# Patient Record
Sex: MALE | Race: OTHER | HISPANIC OR LATINO
[De-identification: names, ages, dates, MRNs, and addresses within clinical notes are randomized per-mention and may not be internally consistent; named-entity substitution may affect disease eponyms.]

---

## 2018-02-14 ENCOUNTER — APPOINTMENT (OUTPATIENT)
Dept: GASTROENTEROLOGY | Facility: CLINIC | Age: 63
End: 2018-02-14
Payer: COMMERCIAL

## 2018-02-14 VITALS
SYSTOLIC BLOOD PRESSURE: 133 MMHG | RESPIRATION RATE: 16 BRPM | WEIGHT: 250 LBS | HEART RATE: 97 BPM | HEIGHT: 68 IN | DIASTOLIC BLOOD PRESSURE: 92 MMHG | BODY MASS INDEX: 37.89 KG/M2

## 2018-02-14 DIAGNOSIS — Z85.038 PERSONAL HISTORY OF OTHER MALIGNANT NEOPLASM OF LARGE INTESTINE: ICD-10-CM

## 2018-02-14 PROCEDURE — 99203 OFFICE O/P NEW LOW 30 MIN: CPT

## 2018-05-04 ENCOUNTER — APPOINTMENT (OUTPATIENT)
Dept: GASTROENTEROLOGY | Facility: GI CENTER | Age: 63
End: 2018-05-04
Payer: COMMERCIAL

## 2018-05-04 ENCOUNTER — RESULT REVIEW (OUTPATIENT)
Age: 63
End: 2018-05-04

## 2018-05-04 ENCOUNTER — OUTPATIENT (OUTPATIENT)
Dept: OUTPATIENT SERVICES | Facility: HOSPITAL | Age: 63
LOS: 1 days | End: 2018-05-04
Payer: COMMERCIAL

## 2018-05-04 DIAGNOSIS — Z86.39 PERSONAL HISTORY OF OTHER ENDOCRINE, NUTRITIONAL AND METABOLIC DISEASE: ICD-10-CM

## 2018-05-04 DIAGNOSIS — D12.2 BENIGN NEOPLASM OF ASCENDING COLON: ICD-10-CM

## 2018-05-04 DIAGNOSIS — D12.5 BENIGN NEOPLASM OF SIGMOID COLON: ICD-10-CM

## 2018-05-04 DIAGNOSIS — Z85.038 PERSONAL HISTORY OF OTHER MALIGNANT NEOPLASM OF LARGE INTESTINE: ICD-10-CM

## 2018-05-04 DIAGNOSIS — K64.8 OTHER HEMORRHOIDS: ICD-10-CM

## 2018-05-04 DIAGNOSIS — Z86.010 PERSONAL HISTORY OF COLONIC POLYPS: ICD-10-CM

## 2018-05-04 PROCEDURE — 45380 COLONOSCOPY AND BIOPSY: CPT

## 2018-05-04 PROCEDURE — 88305 TISSUE EXAM BY PATHOLOGIST: CPT | Mod: 26

## 2018-05-04 PROCEDURE — 88305 TISSUE EXAM BY PATHOLOGIST: CPT

## 2018-05-04 PROCEDURE — 45380 COLONOSCOPY AND BIOPSY: CPT | Mod: PT

## 2018-05-10 LAB — SURGICAL PATHOLOGY FINAL REPORT - CH: SIGNIFICANT CHANGE UP

## 2019-04-23 ENCOUNTER — OUTPATIENT (OUTPATIENT)
Dept: OUTPATIENT SERVICES | Facility: HOSPITAL | Age: 64
LOS: 1 days | End: 2019-04-23
Payer: MEDICARE

## 2019-04-23 ENCOUNTER — APPOINTMENT (OUTPATIENT)
Dept: CT IMAGING | Facility: CLINIC | Age: 64
End: 2019-04-23
Payer: MEDICARE

## 2019-04-23 DIAGNOSIS — Z00.8 ENCOUNTER FOR OTHER GENERAL EXAMINATION: ICD-10-CM

## 2019-04-23 PROCEDURE — 70491 CT SOFT TISSUE NECK W/DYE: CPT | Mod: 26

## 2019-04-23 PROCEDURE — 82565 ASSAY OF CREATININE: CPT

## 2019-04-23 PROCEDURE — 70492 CT SFT TSUE NCK W/O & W/DYE: CPT

## 2019-11-26 ENCOUNTER — TRANSCRIPTION ENCOUNTER (OUTPATIENT)
Age: 64
End: 2019-11-26

## 2019-11-27 ENCOUNTER — OUTPATIENT (OUTPATIENT)
Dept: OUTPATIENT SERVICES | Facility: HOSPITAL | Age: 64
LOS: 1 days | End: 2019-11-27
Payer: MEDICARE

## 2019-11-27 DIAGNOSIS — M54.16 RADICULOPATHY, LUMBAR REGION: ICD-10-CM

## 2019-11-27 PROCEDURE — 77003 FLUOROGUIDE FOR SPINE INJECT: CPT

## 2019-11-27 PROCEDURE — 62323 NJX INTERLAMINAR LMBR/SAC: CPT

## 2020-02-11 ENCOUNTER — TRANSCRIPTION ENCOUNTER (OUTPATIENT)
Age: 65
End: 2020-02-11

## 2020-02-12 ENCOUNTER — OUTPATIENT (OUTPATIENT)
Dept: OUTPATIENT SERVICES | Facility: HOSPITAL | Age: 65
LOS: 1 days | End: 2020-02-12
Payer: MEDICARE

## 2020-02-12 DIAGNOSIS — M54.16 RADICULOPATHY, LUMBAR REGION: ICD-10-CM

## 2020-02-12 PROCEDURE — 62323 NJX INTERLAMINAR LMBR/SAC: CPT

## 2020-02-12 PROCEDURE — 77003 FLUOROGUIDE FOR SPINE INJECT: CPT

## 2021-09-22 ENCOUNTER — NON-APPOINTMENT (OUTPATIENT)
Age: 66
End: 2021-09-22

## 2021-09-24 ENCOUNTER — APPOINTMENT (OUTPATIENT)
Dept: UROLOGY | Facility: CLINIC | Age: 66
End: 2021-09-24

## 2021-09-25 ENCOUNTER — EMERGENCY (EMERGENCY)
Facility: HOSPITAL | Age: 66
LOS: 1 days | Discharge: HOME | End: 2021-09-25
Attending: STUDENT IN AN ORGANIZED HEALTH CARE EDUCATION/TRAINING PROGRAM
Payer: MEDICARE

## 2021-09-25 VITALS
DIASTOLIC BLOOD PRESSURE: 115 MMHG | WEIGHT: 265 LBS | OXYGEN SATURATION: 97 % | SYSTOLIC BLOOD PRESSURE: 148 MMHG | TEMPERATURE: 99 F | RESPIRATION RATE: 18 BRPM | HEART RATE: 72 BPM

## 2021-09-25 VITALS
TEMPERATURE: 98 F | HEART RATE: 63 BPM | SYSTOLIC BLOOD PRESSURE: 112 MMHG | DIASTOLIC BLOOD PRESSURE: 73 MMHG | OXYGEN SATURATION: 97 %

## 2021-09-25 LAB
APPEARANCE UR: ABNORMAL
BACTERIA # UR AUTO: NEGATIVE — SIGNIFICANT CHANGE UP
BILIRUB UR-MCNC: NEGATIVE — SIGNIFICANT CHANGE UP
COLOR SPEC: YELLOW — SIGNIFICANT CHANGE UP
DIFF PNL FLD: ABNORMAL
EPI CELLS # UR: SIGNIFICANT CHANGE UP
GLUCOSE UR QL: NEGATIVE MG/DL — SIGNIFICANT CHANGE UP
KETONES UR-MCNC: NEGATIVE — SIGNIFICANT CHANGE UP
LEUKOCYTE ESTERASE UR-ACNC: ABNORMAL
NITRITE UR-MCNC: NEGATIVE — SIGNIFICANT CHANGE UP
PH UR: 7 — SIGNIFICANT CHANGE UP (ref 5–8)
PROT UR-MCNC: 30 MG/DL
RBC CASTS # UR COMP ASSIST: ABNORMAL /HPF (ref 0–4)
SP GR SPEC: 1.01 — SIGNIFICANT CHANGE UP (ref 1.01–1.02)
UROBILINOGEN FLD QL: 8 MG/DL
WBC UR QL: ABNORMAL

## 2021-09-25 PROCEDURE — 87086 URINE CULTURE/COLONY COUNT: CPT

## 2021-09-25 PROCEDURE — 76870 US EXAM SCROTUM: CPT

## 2021-09-25 PROCEDURE — 76870 US EXAM SCROTUM: CPT | Mod: 26

## 2021-09-25 PROCEDURE — 96372 THER/PROPH/DIAG INJ SC/IM: CPT

## 2021-09-25 PROCEDURE — 99284 EMERGENCY DEPT VISIT MOD MDM: CPT | Mod: 25

## 2021-09-25 PROCEDURE — 87491 CHLMYD TRACH DNA AMP PROBE: CPT

## 2021-09-25 PROCEDURE — 99284 EMERGENCY DEPT VISIT MOD MDM: CPT

## 2021-09-25 PROCEDURE — 81001 URINALYSIS AUTO W/SCOPE: CPT

## 2021-09-25 PROCEDURE — 87591 N.GONORRHOEAE DNA AMP PROB: CPT

## 2021-09-25 RX ORDER — MOXIFLOXACIN HYDROCHLORIDE TABLETS, 400 MG 400 MG/1
1 TABLET, FILM COATED ORAL
Qty: 20 | Refills: 0
Start: 2021-09-25 | End: 2021-10-04

## 2021-09-25 RX ORDER — OXYCODONE AND ACETAMINOPHEN 5; 325 MG/1; MG/1
1 TABLET ORAL ONCE
Refills: 0 | Status: DISCONTINUED | OUTPATIENT
Start: 2021-09-25 | End: 2021-09-25

## 2021-09-25 RX ORDER — KETOROLAC TROMETHAMINE 30 MG/ML
30 SYRINGE (ML) INJECTION ONCE
Refills: 0 | Status: DISCONTINUED | OUTPATIENT
Start: 2021-09-25 | End: 2021-09-25

## 2021-09-25 RX ORDER — CIPROFLOXACIN LACTATE 400MG/40ML
500 VIAL (ML) INTRAVENOUS ONCE
Refills: 0 | Status: COMPLETED | OUTPATIENT
Start: 2021-09-25 | End: 2021-09-25

## 2021-09-25 RX ADMIN — Medication 500 MILLIGRAM(S): at 18:50

## 2021-09-25 RX ADMIN — Medication 30 MILLIGRAM(S): at 12:25

## 2021-09-25 RX ADMIN — OXYCODONE AND ACETAMINOPHEN 1 TABLET(S): 5; 325 TABLET ORAL at 12:25

## 2021-09-25 NOTE — ED PROVIDER NOTE - CARE PROVIDER_API CALL
Abilio Guerrero  UROLOGY  [id number] [address] [place], [place] [address]  Phone: [phone]  Fax: [phone]  Follow Up Time: 1-3 Days

## 2021-09-25 NOTE — ED PROVIDER NOTE - OBJECTIVE STATEMENT
Pt is a 67 yo M with 3 d hx of R testicular pain/swelling.  pt states that the pain has been constant and worsening. no trauma. no penile discharge. Pt states that the pain is nonradiating and the testicle is ttp. no n/v. no fever/chills. no other complaints.

## 2021-09-25 NOTE — ED PROVIDER NOTE - PATIENT PORTAL LINK FT
You can access the FollowMyHealth Patient Portal offered by [facility] by registering at the following website: [email or web address]. By joining [employer]’s FollowMyHealth portal, you will also be able to view your health information using other applications (apps) compatible with our system.

## 2021-09-25 NOTE — ED PROVIDER NOTE - PHYSICAL EXAMINATION
Constitutional - well-developed; well nourished. Head - NCAT. Airway patent. Eyes - PERRL. CV - RRR. no murmur. no edema. Pulm - CTAB. Abd - soft, nt. no rebound. no guarding. Neuro - A&Ox3. strength 5/5 x4. sensation intact x4. normal gait. Skin - No rash. MSK - normal ROM.    - R testicle with significant ttp and firmness. no erythema. no crepitus.

## 2021-09-25 NOTE — ED PROVIDER NOTE - CLINICAL SUMMARY MEDICAL DECISION MAKING FREE TEXT BOX
labs and imaging reviewed. Pt with epididymoorchitis.  Will start cipro and d/c with outpatient f/up to urology. instructed to return for worsening pain, fever, vomiting, or any other concerns.  Pt given a copy of all results and instructed to f/up with pcp regarding any abnormal results.

## 2021-09-27 LAB
C TRACH RRNA SPEC QL NAA+PROBE: SIGNIFICANT CHANGE UP
CULTURE RESULTS: SIGNIFICANT CHANGE UP
N GONORRHOEA RRNA SPEC QL NAA+PROBE: SIGNIFICANT CHANGE UP
SPECIMEN SOURCE: SIGNIFICANT CHANGE UP
SPECIMEN SOURCE: SIGNIFICANT CHANGE UP

## 2021-10-08 PROBLEM — I10 ESSENTIAL (PRIMARY) HYPERTENSION: Chronic | Status: ACTIVE | Noted: 2021-09-25

## 2021-10-08 PROBLEM — J44.9 CHRONIC OBSTRUCTIVE PULMONARY DISEASE, UNSPECIFIED: Chronic | Status: ACTIVE | Noted: 2021-09-25

## 2021-10-12 ENCOUNTER — APPOINTMENT (OUTPATIENT)
Dept: UROLOGY | Facility: CLINIC | Age: 66
End: 2021-10-12
Payer: MEDICARE

## 2021-10-12 VITALS
SYSTOLIC BLOOD PRESSURE: 114 MMHG | HEIGHT: 68 IN | DIASTOLIC BLOOD PRESSURE: 74 MMHG | WEIGHT: 260 LBS | BODY MASS INDEX: 39.4 KG/M2 | HEART RATE: 83 BPM

## 2021-10-12 PROCEDURE — 99204 OFFICE O/P NEW MOD 45 MIN: CPT

## 2021-10-12 RX ORDER — SODIUM SULFATE, POTASSIUM SULFATE, MAGNESIUM SULFATE 17.5; 3.13; 1.6 G/ML; G/ML; G/ML
17.5-3.13-1.6 SOLUTION, CONCENTRATE ORAL
Qty: 1 | Refills: 0 | Status: DISCONTINUED | COMMUNITY
Start: 2018-02-14 | End: 2021-10-12

## 2021-10-12 RX ORDER — MONTELUKAST 10 MG/1
10 TABLET, FILM COATED ORAL
Refills: 0 | Status: ACTIVE | COMMUNITY

## 2021-10-12 NOTE — PHYSICAL EXAM
[General Appearance - Well Developed] : well developed [General Appearance - Well Nourished] : well nourished [Normal Appearance] : normal appearance [Well Groomed] : well groomed [General Appearance - In No Acute Distress] : no acute distress [Edema] : no peripheral edema [] : no respiratory distress [Respiration, Rhythm And Depth] : normal respiratory rhythm and effort [Exaggerated Use Of Accessory Muscles For Inspiration] : no accessory muscle use [Costovertebral Angle Tenderness] : no ~M costovertebral angle tenderness [FreeTextEntry1] : see HPI [Oriented To Time, Place, And Person] : oriented to person, place, and time [Affect] : the affect was normal [Mood] : the mood was normal [Not Anxious] : not anxious

## 2021-10-12 NOTE — HISTORY OF PRESENT ILLNESS
[FreeTextEntry1] : 65 yo M for initial consultation\par PMH: HTN, diet controlled DM, herniated disk, arthritis\par PSH: colon resection (due to cancer, does not know which surgery) 12 years ago, no adjuvant therapy. 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 when he was 13 years old.\par allergy to penicillin\par \par has a history of prostate cancer, was treated with radiation\par having regular PSA tests that are normal (no documents), had his last check just now\par when asked does not have many LUTS, mainly complains of nocturia times 3\par is not on any medication, took flomax in the past, but stopped\par \par complained of right testicular inflammation 2 weeks ago\par was seen in the ED, started on cipro, took 14 days\par US: right epididymoorchitis with large hydrocele\par urine culture: aerococcus\par feeling better, still has some swelling and discomfort, but feeling good\par \par on exam today: still some tenderness, no fluctuations\par explained the patient will need further treatment and repeat imaging\par will re-assess the status of voiding and hydrocele in the future \par \par \par plan:\par - continue NSAIDs and local compressors\par - will prescribe more antibiotics (cipro + doxycycline) \par - start flomax\par - follow up in 1 month with repeat ultrasound and uroflow\par \par

## 2021-10-12 NOTE — ASSESSMENT
[FreeTextEntry1] : \par \par plan:\par - continue NSAIDs and local compressors\par - will prescribe more antibiotics (cipro + doxycycline) \par - start flomax\par - follow up in 1 month with repeat ultrasound and uroflow\par \par

## 2021-11-11 ENCOUNTER — APPOINTMENT (OUTPATIENT)
Dept: ULTRASOUND IMAGING | Facility: CLINIC | Age: 66
End: 2021-11-11
Payer: MEDICARE

## 2021-11-11 ENCOUNTER — OUTPATIENT (OUTPATIENT)
Dept: OUTPATIENT SERVICES | Facility: HOSPITAL | Age: 66
LOS: 1 days | End: 2021-11-11

## 2021-11-11 DIAGNOSIS — N45.3 EPIDIDYMO-ORCHITIS: ICD-10-CM

## 2021-11-11 PROCEDURE — 93975 VASCULAR STUDY: CPT | Mod: 26

## 2021-11-16 ENCOUNTER — APPOINTMENT (OUTPATIENT)
Dept: UROLOGY | Facility: CLINIC | Age: 66
End: 2021-11-16
Payer: MEDICARE

## 2021-11-16 PROCEDURE — 99213 OFFICE O/P EST LOW 20 MIN: CPT

## 2021-11-16 NOTE — ASSESSMENT
[FreeTextEntry1] : plan:\par - abx for 2 more weeks\par - next visit in 1 month with repeat ultrasound

## 2021-11-16 NOTE — PHYSICAL EXAM
[General Appearance - Well Developed] : well developed [General Appearance - Well Nourished] : well nourished [Normal Appearance] : normal appearance [Well Groomed] : well groomed [General Appearance - In No Acute Distress] : no acute distress [FreeTextEntry1] : see HPI [] : no respiratory distress [Respiration, Rhythm And Depth] : normal respiratory rhythm and effort [Exaggerated Use Of Accessory Muscles For Inspiration] : no accessory muscle use [Oriented To Time, Place, And Person] : oriented to person, place, and time [Affect] : the affect was normal [Mood] : the mood was normal [Not Anxious] : not anxious [Normal Station and Gait] : the gait and station were normal for the patient's age

## 2021-11-16 NOTE — HISTORY OF PRESENT ILLNESS
[FreeTextEntry1] : 65 yo M for follow up\par see full notes from previous visit\par \par finished abx\par feeling much better\par US: signs of potential 7 mm residual abscess\par on exam, difficult to assess due to hydrocele, but some mild tenderness remains\par \par patient says his voiding is good with no complains\par again, has his PSA followed regularly, but does not have results\par \par plan:\par - abx for 2 more weeks\par - next visit in 1 month with repeat ultrasound

## 2021-12-17 ENCOUNTER — APPOINTMENT (OUTPATIENT)
Dept: ULTRASOUND IMAGING | Facility: CLINIC | Age: 66
End: 2021-12-17

## 2022-01-03 ENCOUNTER — OUTPATIENT (OUTPATIENT)
Dept: OUTPATIENT SERVICES | Facility: HOSPITAL | Age: 67
LOS: 1 days | End: 2022-01-03

## 2022-01-03 ENCOUNTER — APPOINTMENT (OUTPATIENT)
Dept: ULTRASOUND IMAGING | Facility: CLINIC | Age: 67
End: 2022-01-03
Payer: MEDICARE

## 2022-01-03 DIAGNOSIS — N45.3 EPIDIDYMO-ORCHITIS: ICD-10-CM

## 2022-01-03 PROCEDURE — 93975 VASCULAR STUDY: CPT | Mod: 26

## 2022-01-21 ENCOUNTER — APPOINTMENT (OUTPATIENT)
Dept: UROLOGY | Facility: CLINIC | Age: 67
End: 2022-01-21
Payer: MEDICARE

## 2022-01-21 PROCEDURE — 99214 OFFICE O/P EST MOD 30 MIN: CPT

## 2022-01-22 NOTE — ASSESSMENT
[FreeTextEntry1] : \par plan:\par - ordered MRI\par - prescribed more abx\par - next visit in 2 weeks with result\par - will decide on surgery then

## 2022-01-22 NOTE — HISTORY OF PRESENT ILLNESS
[FreeTextEntry1] : 67 yo M for follow up\par see full notes from previous visit \par \par history of colon and prostate cancer\par now followed due to epididimo-orchitis and suspected abscess\par \par ultrasound: suspected intra and extra testicular abscesses, MRI recommended\par exam today still with suspected lesion/fibrosis at the distal end of the testicle \par \par explained to the patient he may need exploration and possible orchiectomy\par will need an MRI to better demonstrate the findings and decide on treatment plan\par will prescribe more abx in the meantime\par \par plan:\par - ordered MRI\par - prescribed more abx\par - next visit in 2 weeks with result\par - will decide on surgery then [None] : None

## 2022-02-03 ENCOUNTER — NON-APPOINTMENT (OUTPATIENT)
Age: 67
End: 2022-02-03

## 2022-02-04 ENCOUNTER — APPOINTMENT (OUTPATIENT)
Dept: UROLOGY | Facility: CLINIC | Age: 67
End: 2022-02-04
Payer: MEDICARE

## 2022-02-04 PROCEDURE — 99214 OFFICE O/P EST MOD 30 MIN: CPT

## 2022-02-04 NOTE — HISTORY OF PRESENT ILLNESS
[FreeTextEntry1] : 65 yo M for follow up with scrotal abscess\par see full notes from previous visit \par \par feeling well\par still on abx\par new MRI 02/03/2022 [facility]: two right side extratesticular abscesses, likely in the epididymal tail 1.6 + 1 cm. also a large right hydrocele\par \par discussed the findings with the patient\par has been on abx for a month now without complete resolution\par still has some minor pain\par discussed scrotal exploration with hydrocelectomy and drainage of abscess, also discussed the option for a complete orchiectomy on the right side if there is too much fibrosis and adhesions\par discussed the procedure, recovery and [possible complications\par \par plan:\par - abx\par - schedule surgery for scrotal exploration and possible orchiectomy

## 2022-02-04 NOTE — ASSESSMENT
[FreeTextEntry1] : \par plan:\par - abx\par - schedule surgery for scrotal exploration and possible orchiectomy

## 2022-02-23 ENCOUNTER — OUTPATIENT (OUTPATIENT)
Dept: OUTPATIENT SERVICES | Facility: HOSPITAL | Age: 67
LOS: 1 days | End: 2022-02-23
Payer: MEDICARE

## 2022-02-23 VITALS
HEART RATE: 64 BPM | WEIGHT: 277.12 LBS | SYSTOLIC BLOOD PRESSURE: 123 MMHG | DIASTOLIC BLOOD PRESSURE: 82 MMHG | HEIGHT: 68 IN | RESPIRATION RATE: 17 BRPM | OXYGEN SATURATION: 97 % | TEMPERATURE: 99 F

## 2022-02-23 DIAGNOSIS — N45.3 EPIDIDYMO-ORCHITIS: ICD-10-CM

## 2022-02-23 DIAGNOSIS — I10 ESSENTIAL (PRIMARY) HYPERTENSION: ICD-10-CM

## 2022-02-23 DIAGNOSIS — G47.33 OBSTRUCTIVE SLEEP APNEA (ADULT) (PEDIATRIC): ICD-10-CM

## 2022-02-23 DIAGNOSIS — Z90.49 ACQUIRED ABSENCE OF OTHER SPECIFIED PARTS OF DIGESTIVE TRACT: Chronic | ICD-10-CM

## 2022-02-23 DIAGNOSIS — Z01.818 ENCOUNTER FOR OTHER PREPROCEDURAL EXAMINATION: ICD-10-CM

## 2022-02-23 DIAGNOSIS — Z91.89 OTHER SPECIFIED PERSONAL RISK FACTORS, NOT ELSEWHERE CLASSIFIED: ICD-10-CM

## 2022-02-23 LAB
A1C WITH ESTIMATED AVERAGE GLUCOSE RESULT: 5.2 % — SIGNIFICANT CHANGE UP (ref 4–5.6)
ANION GAP SERPL CALC-SCNC: 12 MMOL/L — SIGNIFICANT CHANGE UP (ref 5–17)
APPEARANCE UR: CLEAR — SIGNIFICANT CHANGE UP
APTT BLD: 28.1 SEC — SIGNIFICANT CHANGE UP (ref 27.5–35.5)
BACTERIA # UR AUTO: ABNORMAL
BASOPHILS # BLD AUTO: 0.04 K/UL — SIGNIFICANT CHANGE UP (ref 0–0.2)
BASOPHILS NFR BLD AUTO: 0.4 % — SIGNIFICANT CHANGE UP (ref 0–2)
BILIRUB UR-MCNC: ABNORMAL
BLD GP AB SCN SERPL QL: SIGNIFICANT CHANGE UP
BUN SERPL-MCNC: 16.9 MG/DL — SIGNIFICANT CHANGE UP (ref 8–20)
CALCIUM SERPL-MCNC: 8.9 MG/DL — SIGNIFICANT CHANGE UP (ref 8.6–10.2)
CHLORIDE SERPL-SCNC: 103 MMOL/L — SIGNIFICANT CHANGE UP (ref 98–107)
CO2 SERPL-SCNC: 25 MMOL/L — SIGNIFICANT CHANGE UP (ref 22–29)
COLOR SPEC: YELLOW — SIGNIFICANT CHANGE UP
COMMENT - URINE: SIGNIFICANT CHANGE UP
CREAT SERPL-MCNC: 0.92 MG/DL — SIGNIFICANT CHANGE UP (ref 0.5–1.3)
DIFF PNL FLD: ABNORMAL
EOSINOPHIL # BLD AUTO: 0.2 K/UL — SIGNIFICANT CHANGE UP (ref 0–0.5)
EOSINOPHIL NFR BLD AUTO: 2 % — SIGNIFICANT CHANGE UP (ref 0–6)
EPI CELLS # UR: ABNORMAL
ESTIMATED AVERAGE GLUCOSE: 103 MG/DL — SIGNIFICANT CHANGE UP (ref 68–114)
GLUCOSE SERPL-MCNC: 107 MG/DL — HIGH (ref 70–99)
GLUCOSE UR QL: NEGATIVE MG/DL — SIGNIFICANT CHANGE UP
HCT VFR BLD CALC: 42.2 % — SIGNIFICANT CHANGE UP (ref 39–50)
HGB BLD-MCNC: 13.5 G/DL — SIGNIFICANT CHANGE UP (ref 13–17)
HYALINE CASTS # UR AUTO: ABNORMAL /LPF
IMM GRANULOCYTES NFR BLD AUTO: 0.2 % — SIGNIFICANT CHANGE UP (ref 0–1.5)
INR BLD: 0.88 RATIO — SIGNIFICANT CHANGE UP (ref 0.88–1.16)
KETONES UR-MCNC: ABNORMAL
LEUKOCYTE ESTERASE UR-ACNC: ABNORMAL
LYMPHOCYTES # BLD AUTO: 3.38 K/UL — HIGH (ref 1–3.3)
LYMPHOCYTES # BLD AUTO: 33.6 % — SIGNIFICANT CHANGE UP (ref 13–44)
MCHC RBC-ENTMCNC: 32 GM/DL — SIGNIFICANT CHANGE UP (ref 32–36)
MCHC RBC-ENTMCNC: 32.4 PG — SIGNIFICANT CHANGE UP (ref 27–34)
MCV RBC AUTO: 101.2 FL — HIGH (ref 80–100)
MONOCYTES # BLD AUTO: 0.69 K/UL — SIGNIFICANT CHANGE UP (ref 0–0.9)
MONOCYTES NFR BLD AUTO: 6.9 % — SIGNIFICANT CHANGE UP (ref 2–14)
NEUTROPHILS # BLD AUTO: 5.74 K/UL — SIGNIFICANT CHANGE UP (ref 1.8–7.4)
NEUTROPHILS NFR BLD AUTO: 56.9 % — SIGNIFICANT CHANGE UP (ref 43–77)
NITRITE UR-MCNC: NEGATIVE — SIGNIFICANT CHANGE UP
PH UR: 5 — SIGNIFICANT CHANGE UP (ref 5–8)
PLATELET # BLD AUTO: 316 K/UL — SIGNIFICANT CHANGE UP (ref 150–400)
POTASSIUM SERPL-MCNC: 4.2 MMOL/L — SIGNIFICANT CHANGE UP (ref 3.5–5.3)
POTASSIUM SERPL-SCNC: 4.2 MMOL/L — SIGNIFICANT CHANGE UP (ref 3.5–5.3)
PROT UR-MCNC: SIGNIFICANT CHANGE UP MG/DL
PROTHROM AB SERPL-ACNC: 10.2 SEC — LOW (ref 10.5–13.4)
RBC # BLD: 4.17 M/UL — LOW (ref 4.2–5.8)
RBC # FLD: 13.6 % — SIGNIFICANT CHANGE UP (ref 10.3–14.5)
RBC CASTS # UR COMP ASSIST: ABNORMAL /HPF (ref 0–4)
SODIUM SERPL-SCNC: 140 MMOL/L — SIGNIFICANT CHANGE UP (ref 135–145)
SP GR SPEC: 1.02 — SIGNIFICANT CHANGE UP (ref 1.01–1.02)
UROBILINOGEN FLD QL: 4 MG/DL
WBC # BLD: 10.07 K/UL — SIGNIFICANT CHANGE UP (ref 3.8–10.5)
WBC # FLD AUTO: 10.07 K/UL — SIGNIFICANT CHANGE UP (ref 3.8–10.5)
WBC UR QL: ABNORMAL /HPF (ref 0–5)

## 2022-02-23 PROCEDURE — 93005 ELECTROCARDIOGRAM TRACING: CPT

## 2022-02-23 PROCEDURE — 93010 ELECTROCARDIOGRAM REPORT: CPT

## 2022-02-23 PROCEDURE — G0463: CPT

## 2022-02-23 NOTE — H&P PST ADULT - PROBLEM SELECTOR PLAN 1
Medical clearance pending  Patient scheduled for scrotal exploration, right hydrocelectomy, right drainage or abscess and possible orchiectomy with Dr. Damián Parekh on 3/1/2022.

## 2022-02-23 NOTE — H&P PST ADULT - NSICDXFAMILYHX_GEN_ALL_CORE_FT
FAMILY HISTORY:  Father  Still living? Unknown  Family history not known due to adoption, Age at diagnosis: Age Unknown    Mother  Still living? Unknown  Family history not known due to adoption, Age at diagnosis: Age Unknown

## 2022-02-23 NOTE — H&P PST ADULT - PROBLEM SELECTOR PLAN 3
continue medications as directed  Advised to take losartan morning of with small sip of water  monitor BP

## 2022-02-23 NOTE — H&P PST ADULT - HISTORY OF PRESENT ILLNESS
65 y/o male with PMH of HTN, HLD presents with complaints     for follow up with scrotal abscess  see full notes from previous visit     feeling well  still on abx  new MRI 02/03/2022 [facility]: two right side extratesticular abscesses, likely in the epididymal tail 1.6 + 1 cm. also a large right hydrocele    discussed the findings with the patient  has been on abx for a month now without complete resolution  still has some minor pain  discussed scrotal exploration with hydrocelectomy and drainage of abscess, also discussed the option for a complete orchiectomy on the right side if there is too much fibrosis and adhesions  discussed the procedure, recovery and [possible complications      Patient scheduled for scrotal exploration and possible orchiectomy with Dr. Damián Parekh on     Patient had US doppler of scrotum on 1/21/22 which found Complex 1.6 cm sized right-sided intratesticular and an adjacent 1 cm sized probably extratesticular lesion concerning for intratesticular and extratesticular abscesses, There is associated large hydrocele, Correlation with MR imaging is suggested, Asymmetric smaller size and diminished echogenicity of the left testes.   65 y/o male with PMH of HTN, HLD, prostate CA (S/P radiation therapy in 2013) presents with complaints of right scrotal pain. States approximately 5 months ago he noticed an abscess on the right scrotum. States he has been on and off abx with minimal relief. States the pain worsens with ceratin sitting and lying positions. States he takes ibuprofen as needed for pain which temporarily will relive the pain. Patient had US doppler of scrotum on 1/21/22 which found Complex 1.6 cm sized right-sided intratesticular and an adjacent 1 cm sized probably extratesticular lesion concerning for intratesticular and extratesticular abscesses, There is associated large hydrocele, Correlation with MR imaging is suggested, Asymmetric smaller size and diminished echogenicity of the left testes. MRI 02/03/2022 [facility]: two right side extratesticular abscesses, likely in the epididymal tail 1.6 + 1 cm. also a large right hydrocele. Denies fevers, chills, nausea, vomiting or blood in urine. Patient scheduled for scrotal exploration, right hydrocelectomy, right drainage or abscess and possible orchiectomy with Dr. Damián Parekh on 3/1/2022.

## 2022-02-23 NOTE — H&P PST ADULT - NSICDXPASTMEDICALHX_GEN_ALL_CORE_FT
PAST MEDICAL HISTORY:  COPD, mild     HTN (hypertension)      PAST MEDICAL HISTORY:  COPD, mild     Epididymo-orchitis     HTN (hypertension)     Prostate CA s/p radiation 2013

## 2022-02-23 NOTE — H&P PST ADULT - ASSESSMENT
Patient educated on surgical scrub, COVID testing scheduled for 22, preadmission instructions, medical clearance and day of procedure medications, verbalizes understanding. Pt instructed to stop vitamins/supplements/herbal medications/ASA/NSAIDS for one week prior to surgery and discuss with PMD.      CAPRINI SCORE [CLOT]    AGE RELATED RISK FACTORS                                                       MOBILITY RELATED FACTORS  [ ] Age 41-60 years                                            (1 Point)                  [ ] Bed rest                                                        (1 Point)  [ ] Age: 61-74 years                                           (2 Points)                 [ ] Plaster cast                                                   (2 Points)  [ ] Age= 75 years                                              (3 Points)                 [ ] Bed bound for more than 72 hours                 (2 Points)    DISEASE RELATED RISK FACTORS                                               GENDER SPECIFIC FACTORS  [ ] Edema in the lower extremities                       (1 Point)                  [ ] Pregnancy                                                     (1 Point)  [ ] Varicose veins                                               (1 Point)                  [ ] Post-partum < 6 weeks                                   (1 Point)             [ ] BMI > 25 Kg/m2                                            (1 Point)                  [ ] Hormonal therapy  or oral contraception          (1 Point)                 [ ] Sepsis (in the previous month)                        (1 Point)                  [ ] History of pregnancy complications                 (1 point)  [ ] Pneumonia or serious lung disease                                               [ ] Unexplained or recurrent                     (1 Point)           (in the previous month)                               (1 Point)  [ ] Abnormal pulmonary function test                     (1 Point)                 SURGERY RELATED RISK FACTORS  [ ] Acute myocardial infarction                              (1 Point)                 [ ]  Section                                             (1 Point)  [ ] Congestive heart failure (in the previous month)  (1 Point)               [ ] Minor surgery                                                  (1 Point)   [ ] Inflammatory bowel disease                             (1 Point)                 [ ] Arthroscopic surgery                                        (2 Points)  [ ] Central venous access                                      (2 Points)                [ ] General surgery lasting more than 45 minutes   (2 Points)       [ ] Stroke (in the previous month)                          (5 Points)               [ ] Elective arthroplasty                                         (5 Points)                                                                                                                                               HEMATOLOGY RELATED FACTORS                                                 TRAUMA RELATED RISK FACTORS  [ ] Prior episodes of VTE                                     (3 Points)                [ ] Fracture of the hip, pelvis, or leg                       (5 Points)  [ ] Positive family history for VTE                         (3 Points)                 [ ] Acute spinal cord injury (in the previous month)  (5 Points)  [ ] Prothrombin 53400 A                                     (3 Points)                 [ ] Paralysis  (less than 1 month)                             (5 Points)  [ ] Factor V Leiden                                             (3 Points)                  [ ] Multiple Trauma within 1 month                        (5 Points)  [ ] Lupus anticoagulants                                     (3 Points)                                                           [ ] Anticardiolipin antibodies                               (3 Points)                                                       [ ] High homocysteine in the blood                      (3 Points)                                             [ ] Other congenital or acquired thrombophilia      (3 Points)                                                [ ] Heparin induced thrombocytopenia                  (3 Points)                                          Total Score [          ]    Caprini Score 0 - 2:  Low Risk, No VTE Prophylaxis required for most patients, encourage ambulation  Caprini Score 3 - 6:  At Risk, pharmacologic VTE prophylaxis is indicated for most patients (in the absence of a contraindication)  Caprini Score Greater than or = 7:  High Risk, pharmacologic VTE prophylaxis is indicated for most patients (in the absence of a contraindication)    OPIOID RISK TOOL    MIKE EACH BOX THAT APPLIES AND ADD TOTALS AT THE END    FAMILY HISTORY OF SUBSTANCE ABUSE                 FEMALE         MALE                                                Alcohol                             [  ]1 pt          [  ]3pts                                               Illegal Durgs                     [  ]2 pts        [  ]3pts                                               Rx Drugs                           [  ]4 pts        [  ]4 pts    PERSONAL HISTORY OF SUBSTANCE ABUSE                                                                                          Alcohol                             [  ]3 pts       [  ]3 pts                                               Illegal Drugs                     [  ]4 pts        [  ]4 pts                                               Rx Drugs                           [  ]5 pts        [  ]5 pts    AGE BETWEEN 16-45 YEARS                                      [  ]1 pt         [  ]1 pt    HISTORY OF PREADOLESCENT   SEXUAL ABUSE                                                             [  ]3 pts        [  ]0pts    PSYCHOLOGICAL DISEASE                     ADD, OCD, Bipolar, Schizophrenia        [  ]2 pts         [  ]2 pts                      Depression                                               [  ]1 pt           [  ]1 pt           SCORING TOTAL   (add numbers and type here)              (***)                                     A score of 3 or lower indicated LOW risk for future opioid abuse  A score of 4 to 7 indicated moderate risk for future opioid abuse  A score of 8 or higher indicates a high risk for opioid abuse   65 y/o male with PMH of HTN, HLD, prostate CA (S/P radiation therapy in 2013) presents with complaints of right scrotal pain. States approximately 5 months ago he noticed an abscess on the right scrotum. States he has been on and off abx with minimal relief. States the pain worsens with ceratin sitting and lying positions. States he takes ibuprofen as needed for pain which temporarily will relive the pain. Patient had US doppler of scrotum on 22 which found Complex 1.6 cm sized right-sided intratesticular and an adjacent 1 cm sized probably extratesticular lesion concerning for intratesticular and extratesticular abscesses, There is associated large hydrocele, Correlation with MR imaging is suggested, Asymmetric smaller size and diminished echogenicity of the left testes. MRI 2022 [facility]: two right side extratesticular abscesses, likely in the epididymal tail 1.6 + 1 cm. also a large right hydrocele. Denies fevers, chills, nausea, vomiting or blood in urine. Patient scheduled for scrotal exploration, right hydrocelectomy, right drainage or abscess and possible orchiectomy with Dr. Damián Parekh on 3/1/2022. Patient educated on surgical scrub, COVID testing scheduled for 22, preadmission instructions, medical clearance and day of procedure medications, verbalizes understanding. Pt instructed to stop vitamins/supplements/herbal medications/ASA/NSAIDS for one week prior to surgery and discuss with PMD.    CAPRINI SCORE [CLOT]    AGE RELATED RISK FACTORS                                                       MOBILITY RELATED FACTORS  [ ] Age 41-60 years                                            (1 Point)                  [ ] Bed rest                                                        (1 Point)  [x ] Age: 61-74 years                                           (2 Points)                 [ ] Plaster cast                                                   (2 Points)  [ ] Age= 75 years                                              (3 Points)                 [ ] Bed bound for more than 72 hours                 (2 Points)    DISEASE RELATED RISK FACTORS                                               GENDER SPECIFIC FACTORS  [ x] Edema in the lower extremities                       (1 Point)                  [ ] Pregnancy                                                     (1 Point)  [ ] Varicose veins                                               (1 Point)                  [ ] Post-partum < 6 weeks                                   (1 Point)             [x ] BMI > 25 Kg/m2                                            (1 Point)                  [ ] Hormonal therapy  or oral contraception          (1 Point)                 [ ] Sepsis (in the previous month)                        (1 Point)                  [ ] History of pregnancy complications                 (1 point)  [ ] Pneumonia or serious lung disease                                               [ ] Unexplained or recurrent                     (1 Point)           (in the previous month)                               (1 Point)  [ ] Abnormal pulmonary function test                     (1 Point)                 SURGERY RELATED RISK FACTORS  [ ] Acute myocardial infarction                              (1 Point)                 [ ]  Section                                             (1 Point)  [ ] Congestive heart failure (in the previous month)  (1 Point)               [ ] Minor surgery                                                  (1 Point)   [ ] Inflammatory bowel disease                             (1 Point)                 [ ] Arthroscopic surgery                                        (2 Points)  [ ] Central venous access                                      (2 Points)                [x ] General surgery lasting more than 45 minutes   (2 Points)       [ ] Stroke (in the previous month)                          (5 Points)               [ ] Elective arthroplasty                                         (5 Points)                                                                                                                                               HEMATOLOGY RELATED FACTORS                                                 TRAUMA RELATED RISK FACTORS  [ ] Prior episodes of VTE                                     (3 Points)                [ ] Fracture of the hip, pelvis, or leg                       (5 Points)  [ ] Positive family history for VTE                         (3 Points)                 [ ] Acute spinal cord injury (in the previous month)  (5 Points)  [ ] Prothrombin 41061 A                                     (3 Points)                 [ ] Paralysis  (less than 1 month)                             (5 Points)  [ ] Factor V Leiden                                             (3 Points)                  [ ] Multiple Trauma within 1 month                        (5 Points)  [ ] Lupus anticoagulants                                     (3 Points)                                                           [ ] Anticardiolipin antibodies                               (3 Points)                                                       [ ] High homocysteine in the blood                      (3 Points)                                             [ ] Other congenital or acquired thrombophilia      (3 Points)                                                [ ] Heparin induced thrombocytopenia                  (3 Points)                                          Total Score [   6  ]    Caprini Score 0 - 2:  Low Risk, No VTE Prophylaxis required for most patients, encourage ambulation  Caprini Score 3 - 6:  At Risk, pharmacologic VTE prophylaxis is indicated for most patients (in the absence of a contraindication)  Caprini Score Greater than or = 7:  High Risk, pharmacologic VTE prophylaxis is indicated for most patients (in the absence of a contraindication)    OPIOID RISK TOOL    MIKE EACH BOX THAT APPLIES AND ADD TOTALS AT THE END    FAMILY HISTORY OF SUBSTANCE ABUSE                 FEMALE         MALE                                                Alcohol                             [  ]1 pt          [  ]3pts                                               Illegal Durgs                     [  ]2 pts        [  ]3pts                                               Rx Drugs                           [  ]4 pts        [  ]4 pts    PERSONAL HISTORY OF SUBSTANCE ABUSE                                                                                          Alcohol                             [  ]3 pts       [  ]3 pts                                               Illegal Drugs                     [  ]4 pts        [  ]4 pts                                               Rx Drugs                           [  ]5 pts        [  ]5 pts    AGE BETWEEN 16-45 YEARS                                      [  ]1 pt         [  ]1 pt    HISTORY OF PREADOLESCENT   SEXUAL ABUSE                                                             [  ]3 pts        [  ]0pts    PSYCHOLOGICAL DISEASE                     ADD, OCD, Bipolar, Schizophrenia        [  ]2 pts         [  ]2 pts                      Depression                                               [  ]1 pt           [  ]1 pt           SCORING TOTAL   (add numbers and type here)              (***0)                                     A score of 3 or lower indicated LOW risk for future opioid abuse  A score of 4 to 7 indicated moderate risk for future opioid abuse  A score of 8 or higher indicates a high risk for opioid abuse

## 2022-02-23 NOTE — ASU PATIENT PROFILE, ADULT - FALL HARM RISK - HARM RISK INTERVENTIONS

## 2022-02-25 ENCOUNTER — RESULT REVIEW (OUTPATIENT)
Age: 67
End: 2022-02-25

## 2022-02-25 ENCOUNTER — OUTPATIENT (OUTPATIENT)
Dept: OUTPATIENT SERVICES | Facility: HOSPITAL | Age: 67
LOS: 1 days | End: 2022-02-25
Payer: MEDICARE

## 2022-02-25 DIAGNOSIS — Z90.49 ACQUIRED ABSENCE OF OTHER SPECIFIED PARTS OF DIGESTIVE TRACT: Chronic | ICD-10-CM

## 2022-02-25 DIAGNOSIS — Z01.812 ENCOUNTER FOR PREPROCEDURAL LABORATORY EXAMINATION: ICD-10-CM

## 2022-02-25 PROBLEM — C61 MALIGNANT NEOPLASM OF PROSTATE: Chronic | Status: ACTIVE | Noted: 2022-02-23

## 2022-02-25 PROBLEM — N45.3 EPIDIDYMO-ORCHITIS: Chronic | Status: ACTIVE | Noted: 2022-02-23

## 2022-02-25 LAB
CULTURE RESULTS: SIGNIFICANT CHANGE UP
SPECIMEN SOURCE: SIGNIFICANT CHANGE UP

## 2022-02-25 PROCEDURE — 71046 X-RAY EXAM CHEST 2 VIEWS: CPT

## 2022-02-25 PROCEDURE — 71046 X-RAY EXAM CHEST 2 VIEWS: CPT | Mod: 26

## 2022-02-28 ENCOUNTER — TRANSCRIPTION ENCOUNTER (OUTPATIENT)
Age: 67
End: 2022-02-28

## 2022-03-01 ENCOUNTER — RESULT REVIEW (OUTPATIENT)
Age: 67
End: 2022-03-01

## 2022-03-01 ENCOUNTER — OUTPATIENT (OUTPATIENT)
Dept: OUTPATIENT SERVICES | Facility: HOSPITAL | Age: 67
LOS: 1 days | End: 2022-03-01
Payer: MEDICARE

## 2022-03-01 ENCOUNTER — APPOINTMENT (OUTPATIENT)
Dept: UROLOGY | Facility: HOSPITAL | Age: 67
End: 2022-03-01

## 2022-03-01 VITALS
TEMPERATURE: 97 F | RESPIRATION RATE: 16 BRPM | HEART RATE: 75 BPM | SYSTOLIC BLOOD PRESSURE: 149 MMHG | HEIGHT: 66 IN | OXYGEN SATURATION: 100 % | DIASTOLIC BLOOD PRESSURE: 78 MMHG | WEIGHT: 281.75 LBS

## 2022-03-01 VITALS
OXYGEN SATURATION: 96 % | HEART RATE: 68 BPM | DIASTOLIC BLOOD PRESSURE: 58 MMHG | RESPIRATION RATE: 15 BRPM | TEMPERATURE: 98 F | SYSTOLIC BLOOD PRESSURE: 100 MMHG

## 2022-03-01 DIAGNOSIS — Z90.49 ACQUIRED ABSENCE OF OTHER SPECIFIED PARTS OF DIGESTIVE TRACT: Chronic | ICD-10-CM

## 2022-03-01 DIAGNOSIS — N45.3 EPIDIDYMO-ORCHITIS: ICD-10-CM

## 2022-03-01 PROCEDURE — 88305 TISSUE EXAM BY PATHOLOGIST: CPT | Mod: 26

## 2022-03-01 PROCEDURE — 54520 REMOVAL OF TESTIS: CPT | Mod: RT,22

## 2022-03-01 PROCEDURE — 88305 TISSUE EXAM BY PATHOLOGIST: CPT

## 2022-03-01 PROCEDURE — 54522 ORCHIECTOMY PARTIAL: CPT | Mod: RT

## 2022-03-01 RX ORDER — ONDANSETRON 8 MG/1
4 TABLET, FILM COATED ORAL ONCE
Refills: 0 | Status: DISCONTINUED | OUTPATIENT
Start: 2022-03-01 | End: 2022-03-01

## 2022-03-01 RX ORDER — FENTANYL CITRATE 50 UG/ML
25 INJECTION INTRAVENOUS
Refills: 0 | Status: DISCONTINUED | OUTPATIENT
Start: 2022-03-01 | End: 2022-03-01

## 2022-03-01 RX ORDER — OXYCODONE HYDROCHLORIDE 5 MG/1
1 TABLET ORAL
Qty: 12 | Refills: 0
Start: 2022-03-01 | End: 2022-03-03

## 2022-03-01 RX ORDER — HYDROMORPHONE HYDROCHLORIDE 2 MG/ML
1 INJECTION INTRAMUSCULAR; INTRAVENOUS; SUBCUTANEOUS ONCE
Refills: 0 | Status: DISCONTINUED | OUTPATIENT
Start: 2022-03-01 | End: 2022-03-01

## 2022-03-01 RX ORDER — SODIUM CHLORIDE 9 MG/ML
1000 INJECTION, SOLUTION INTRAVENOUS
Refills: 0 | Status: DISCONTINUED | OUTPATIENT
Start: 2022-03-01 | End: 2022-03-01

## 2022-03-01 RX ORDER — ACETAMINOPHEN 500 MG
975 TABLET ORAL ONCE
Refills: 0 | Status: COMPLETED | OUTPATIENT
Start: 2022-03-01 | End: 2022-03-01

## 2022-03-01 RX ORDER — SODIUM CHLORIDE 9 MG/ML
3 INJECTION INTRAMUSCULAR; INTRAVENOUS; SUBCUTANEOUS ONCE
Refills: 0 | Status: DISCONTINUED | OUTPATIENT
Start: 2022-03-01 | End: 2022-03-01

## 2022-03-01 RX ORDER — HYDROMORPHONE HYDROCHLORIDE 2 MG/ML
0.5 INJECTION INTRAMUSCULAR; INTRAVENOUS; SUBCUTANEOUS
Refills: 0 | Status: DISCONTINUED | OUTPATIENT
Start: 2022-03-01 | End: 2022-03-01

## 2022-03-01 RX ADMIN — Medication 100 MILLIGRAM(S): at 10:10

## 2022-03-01 RX ADMIN — Medication 975 MILLIGRAM(S): at 08:52

## 2022-03-01 RX ADMIN — SODIUM CHLORIDE 125 MILLILITER(S): 9 INJECTION, SOLUTION INTRAVENOUS at 13:30

## 2022-03-01 NOTE — BRIEF OPERATIVE NOTE - OPERATION/FINDINGS
scerotal exploration  severe adhesions across all layers  hydrocele drained   cannot easily separate the inflamed area from the testis - orchiectomy was performed  cord tight with two 1-0 silk suture tie, and one silk tie  cavity washed with betadine before closing

## 2022-03-01 NOTE — ASU DISCHARGE PLAN (ADULT/PEDIATRIC) - ASU DC SPECIAL INSTRUCTIONSFT
You may take tylenol and/or motrin for mild-moderate pain  oxycodone has been prescribed for more severe pain  take clindamycin (antibiotic) as prescribed  follow-up with Dr. Parekh, his office will contact you for follow-up apt.   NOtify Dr. Parekh for any concerning symptoms including bleeding, inability to urinate, swelling, chest pain, shortness of breath, drainage.   Cover your incion with bacitracin

## 2022-03-01 NOTE — ASU DISCHARGE PLAN (ADULT/PEDIATRIC) - CARE PROVIDER_API CALL
Damián Parekh)  Urology  [address], [place] [address]  Phone: [phone]  Fax: [phone]  Follow Up Time:

## 2022-03-08 ENCOUNTER — TRANSCRIPTION ENCOUNTER (OUTPATIENT)
Age: 67
End: 2022-03-08

## 2022-03-08 ENCOUNTER — APPOINTMENT (OUTPATIENT)
Dept: UROLOGY | Facility: CLINIC | Age: 67
End: 2022-03-08
Payer: MEDICARE

## 2022-03-08 ENCOUNTER — INPATIENT (INPATIENT)
Facility: HOSPITAL | Age: 67
LOS: 15 days | Discharge: HOME | DRG: 856 | End: 2022-03-24
Attending: STUDENT IN AN ORGANIZED HEALTH CARE EDUCATION/TRAINING PROGRAM | Admitting: SURGERY
Payer: MEDICARE

## 2022-03-08 VITALS
TEMPERATURE: 98 F | DIASTOLIC BLOOD PRESSURE: 61 MMHG | SYSTOLIC BLOOD PRESSURE: 85 MMHG | OXYGEN SATURATION: 96 % | RESPIRATION RATE: 16 BRPM | HEIGHT: 66 IN | WEIGHT: 278.88 LBS | HEART RATE: 119 BPM

## 2022-03-08 DIAGNOSIS — Z90.49 ACQUIRED ABSENCE OF OTHER SPECIFIED PARTS OF DIGESTIVE TRACT: Chronic | ICD-10-CM

## 2022-03-08 DIAGNOSIS — N50.89 OTHER SPECIFIED DISORDERS OF THE MALE GENITAL ORGANS: ICD-10-CM

## 2022-03-08 LAB
ALBUMIN SERPL ELPH-MCNC: 3.2 G/DL — LOW (ref 3.3–5.2)
ALP SERPL-CCNC: 95 U/L — SIGNIFICANT CHANGE UP (ref 40–120)
ALT FLD-CCNC: 18 U/L — SIGNIFICANT CHANGE UP
ANION GAP SERPL CALC-SCNC: 17 MMOL/L — SIGNIFICANT CHANGE UP (ref 5–17)
APPEARANCE UR: ABNORMAL
APTT BLD: 28.9 SEC — SIGNIFICANT CHANGE UP (ref 27.5–35.5)
AST SERPL-CCNC: 10 U/L — SIGNIFICANT CHANGE UP
BACTERIA # UR AUTO: ABNORMAL
BASE EXCESS BLDV CALC-SCNC: -2.6 MMOL/L — LOW (ref -2–3)
BASOPHILS # BLD AUTO: 0 K/UL — SIGNIFICANT CHANGE UP (ref 0–0.2)
BASOPHILS NFR BLD AUTO: 0 % — SIGNIFICANT CHANGE UP (ref 0–2)
BILIRUB SERPL-MCNC: 1.3 MG/DL — SIGNIFICANT CHANGE UP (ref 0.4–2)
BILIRUB UR-MCNC: ABNORMAL
BUN SERPL-MCNC: 33.3 MG/DL — HIGH (ref 8–20)
CA-I SERPL-SCNC: 1.07 MMOL/L — LOW (ref 1.15–1.33)
CALCIUM SERPL-MCNC: 8.5 MG/DL — LOW (ref 8.6–10.2)
CHLORIDE BLDV-SCNC: 99 MMOL/L — SIGNIFICANT CHANGE UP (ref 98–107)
CHLORIDE SERPL-SCNC: 96 MMOL/L — LOW (ref 98–107)
CO2 SERPL-SCNC: 22 MMOL/L — SIGNIFICANT CHANGE UP (ref 22–29)
COLOR SPEC: ABNORMAL
CREAT SERPL-MCNC: 3.45 MG/DL — HIGH (ref 0.5–1.3)
DIFF PNL FLD: ABNORMAL
EGFR: 19 ML/MIN/1.73M2 — LOW
EOSINOPHIL # BLD AUTO: 0 K/UL — SIGNIFICANT CHANGE UP (ref 0–0.5)
EOSINOPHIL NFR BLD AUTO: 0 % — SIGNIFICANT CHANGE UP (ref 0–6)
EPI CELLS # UR: SIGNIFICANT CHANGE UP
FLUAV AG NPH QL: SIGNIFICANT CHANGE UP
FLUBV AG NPH QL: SIGNIFICANT CHANGE UP
GAS PNL BLDV: 132 MMOL/L — LOW (ref 136–145)
GAS PNL BLDV: SIGNIFICANT CHANGE UP
GIANT PLATELETS BLD QL SMEAR: PRESENT — SIGNIFICANT CHANGE UP
GLUCOSE BLDV-MCNC: 227 MG/DL — HIGH (ref 70–99)
GLUCOSE SERPL-MCNC: 217 MG/DL — HIGH (ref 70–99)
GLUCOSE UR QL: 50 MG/DL
HCO3 BLDV-SCNC: 24 MMOL/L — SIGNIFICANT CHANGE UP (ref 22–29)
HCT VFR BLD CALC: 38.7 % — LOW (ref 39–50)
HCT VFR BLDA CALC: 39 % — SIGNIFICANT CHANGE UP
HGB BLD CALC-MCNC: 13 G/DL — SIGNIFICANT CHANGE UP (ref 12.6–17.4)
HGB BLD-MCNC: 12.5 G/DL — LOW (ref 13–17)
HIV 1 & 2 AB SERPL IA.RAPID: SIGNIFICANT CHANGE UP
INR BLD: 1.2 RATIO — HIGH (ref 0.88–1.16)
KETONES UR-MCNC: ABNORMAL
LACTATE BLDV-MCNC: 2.3 MMOL/L — HIGH (ref 0.5–2)
LACTATE BLDV-MCNC: 3.2 MMOL/L — HIGH (ref 0.5–2)
LEUKOCYTE ESTERASE UR-ACNC: ABNORMAL
LYMPHOCYTES # BLD AUTO: 1.2 K/UL — SIGNIFICANT CHANGE UP (ref 1–3.3)
LYMPHOCYTES # BLD AUTO: 5.3 % — LOW (ref 13–44)
MANUAL SMEAR VERIFICATION: SIGNIFICANT CHANGE UP
MCHC RBC-ENTMCNC: 32.3 GM/DL — SIGNIFICANT CHANGE UP (ref 32–36)
MCHC RBC-ENTMCNC: 32.6 PG — SIGNIFICANT CHANGE UP (ref 27–34)
MCV RBC AUTO: 101 FL — HIGH (ref 80–100)
METAMYELOCYTES # FLD: 1.7 % — HIGH (ref 0–0)
MONOCYTES # BLD AUTO: 0.99 K/UL — HIGH (ref 0–0.9)
MONOCYTES NFR BLD AUTO: 4.4 % — SIGNIFICANT CHANGE UP (ref 2–14)
NEUTROPHILS # BLD AUTO: 19 K/UL — HIGH (ref 1.8–7.4)
NEUTROPHILS NFR BLD AUTO: 82.5 % — HIGH (ref 43–77)
NEUTS BAND # BLD: 1.7 % — SIGNIFICANT CHANGE UP (ref 0–8)
NITRITE UR-MCNC: POSITIVE
PCO2 BLDV: 50 MMHG — SIGNIFICANT CHANGE UP (ref 42–55)
PH BLDV: 7.29 — LOW (ref 7.32–7.43)
PH UR: 5 — SIGNIFICANT CHANGE UP (ref 5–8)
PLAT MORPH BLD: NORMAL — SIGNIFICANT CHANGE UP
PLATELET # BLD AUTO: 265 K/UL — SIGNIFICANT CHANGE UP (ref 150–400)
PO2 BLDV: 70 MMHG — HIGH (ref 25–45)
POTASSIUM BLDV-SCNC: 3.6 MMOL/L — SIGNIFICANT CHANGE UP (ref 3.5–5.1)
POTASSIUM SERPL-MCNC: 3.6 MMOL/L — SIGNIFICANT CHANGE UP (ref 3.5–5.3)
POTASSIUM SERPL-SCNC: 3.6 MMOL/L — SIGNIFICANT CHANGE UP (ref 3.5–5.3)
PROT SERPL-MCNC: 7 G/DL — SIGNIFICANT CHANGE UP (ref 6.6–8.7)
PROT UR-MCNC: 30 MG/DL
PROTHROM AB SERPL-ACNC: 14 SEC — HIGH (ref 10.5–13.4)
RBC # BLD: 3.83 M/UL — LOW (ref 4.2–5.8)
RBC # FLD: 13.9 % — SIGNIFICANT CHANGE UP (ref 10.3–14.5)
RBC BLD AUTO: NORMAL — SIGNIFICANT CHANGE UP
RBC CASTS # UR COMP ASSIST: ABNORMAL /HPF (ref 0–4)
RSV RNA NPH QL NAA+NON-PROBE: SIGNIFICANT CHANGE UP
SAO2 % BLDV: 94.8 % — SIGNIFICANT CHANGE UP
SARS-COV-2 RNA SPEC QL NAA+PROBE: SIGNIFICANT CHANGE UP
SODIUM SERPL-SCNC: 135 MMOL/L — SIGNIFICANT CHANGE UP (ref 135–145)
SP GR SPEC: 1.02 — SIGNIFICANT CHANGE UP (ref 1.01–1.02)
SURGICAL PATHOLOGY STUDY: SIGNIFICANT CHANGE UP
UROBILINOGEN FLD QL: 4
VARIANT LYMPHS # BLD: 4.4 % — SIGNIFICANT CHANGE UP (ref 0–6)
WBC # BLD: 22.56 K/UL — HIGH (ref 3.8–10.5)
WBC # FLD AUTO: 22.56 K/UL — HIGH (ref 3.8–10.5)
WBC UR QL: ABNORMAL /HPF (ref 0–5)

## 2022-03-08 PROCEDURE — 76770 US EXAM ABDO BACK WALL COMP: CPT | Mod: 26

## 2022-03-08 PROCEDURE — 71045 X-RAY EXAM CHEST 1 VIEW: CPT | Mod: 26

## 2022-03-08 PROCEDURE — 72192 CT PELVIS W/O DYE: CPT | Mod: 26

## 2022-03-08 PROCEDURE — 99291 CRITICAL CARE FIRST HOUR: CPT

## 2022-03-08 PROCEDURE — 99213 OFFICE O/P EST LOW 20 MIN: CPT | Mod: 24

## 2022-03-08 PROCEDURE — 71045 X-RAY EXAM CHEST 1 VIEW: CPT | Mod: 26,77

## 2022-03-08 PROCEDURE — 93010 ELECTROCARDIOGRAM REPORT: CPT

## 2022-03-08 PROCEDURE — 76870 US EXAM SCROTUM: CPT | Mod: 26

## 2022-03-08 RX ORDER — VANCOMYCIN HCL 1 G
750 VIAL (EA) INTRAVENOUS EVERY 24 HOURS
Refills: 0 | Status: DISCONTINUED | OUTPATIENT
Start: 2022-03-09 | End: 2022-03-09

## 2022-03-08 RX ORDER — INFLUENZA VIRUS VACCINE 15; 15; 15; 15 UG/.5ML; UG/.5ML; UG/.5ML; UG/.5ML
0.7 SUSPENSION INTRAMUSCULAR ONCE
Refills: 0 | Status: DISCONTINUED | OUTPATIENT
Start: 2022-03-08 | End: 2022-03-24

## 2022-03-08 RX ORDER — HYDROMORPHONE HYDROCHLORIDE 2 MG/ML
0.5 INJECTION INTRAMUSCULAR; INTRAVENOUS; SUBCUTANEOUS EVERY 4 HOURS
Refills: 0 | Status: DISCONTINUED | OUTPATIENT
Start: 2022-03-08 | End: 2022-03-09

## 2022-03-08 RX ORDER — NOREPINEPHRINE BITARTRATE/D5W 8 MG/250ML
0.05 PLASTIC BAG, INJECTION (ML) INTRAVENOUS
Qty: 8 | Refills: 0 | Status: DISCONTINUED | OUTPATIENT
Start: 2022-03-08 | End: 2022-03-09

## 2022-03-08 RX ORDER — SODIUM CHLORIDE 9 MG/ML
1000 INJECTION, SOLUTION INTRAVENOUS
Refills: 0 | Status: DISCONTINUED | OUTPATIENT
Start: 2022-03-08 | End: 2022-03-09

## 2022-03-08 RX ORDER — SODIUM CHLORIDE 9 MG/ML
2000 INJECTION, SOLUTION INTRAVENOUS ONCE
Refills: 0 | Status: COMPLETED | OUTPATIENT
Start: 2022-03-08 | End: 2022-03-08

## 2022-03-08 RX ORDER — ACETAMINOPHEN 500 MG
650 TABLET ORAL EVERY 6 HOURS
Refills: 0 | Status: DISCONTINUED | OUTPATIENT
Start: 2022-03-08 | End: 2022-03-09

## 2022-03-08 RX ORDER — HEPARIN SODIUM 5000 [USP'U]/ML
7500 INJECTION INTRAVENOUS; SUBCUTANEOUS EVERY 8 HOURS
Refills: 0 | Status: DISCONTINUED | OUTPATIENT
Start: 2022-03-08 | End: 2022-03-09

## 2022-03-08 RX ORDER — CEFEPIME 1 G/1
1000 INJECTION, POWDER, FOR SOLUTION INTRAMUSCULAR; INTRAVENOUS EVERY 12 HOURS
Refills: 0 | Status: DISCONTINUED | OUTPATIENT
Start: 2022-03-08 | End: 2022-03-09

## 2022-03-08 RX ORDER — IPRATROPIUM/ALBUTEROL SULFATE 18-103MCG
3 AEROSOL WITH ADAPTER (GRAM) INHALATION EVERY 6 HOURS
Refills: 0 | Status: DISCONTINUED | OUTPATIENT
Start: 2022-03-08 | End: 2022-03-09

## 2022-03-08 RX ORDER — VANCOMYCIN HCL 1 G
2000 VIAL (EA) INTRAVENOUS ONCE
Refills: 0 | Status: DISCONTINUED | OUTPATIENT
Start: 2022-03-08 | End: 2022-03-08

## 2022-03-08 RX ORDER — CEFEPIME 1 G/1
1000 INJECTION, POWDER, FOR SOLUTION INTRAMUSCULAR; INTRAVENOUS EVERY 12 HOURS
Refills: 0 | Status: DISCONTINUED | OUTPATIENT
Start: 2022-03-08 | End: 2022-03-08

## 2022-03-08 RX ORDER — METRONIDAZOLE 500 MG
500 TABLET ORAL EVERY 8 HOURS
Refills: 0 | Status: DISCONTINUED | OUTPATIENT
Start: 2022-03-08 | End: 2022-03-09

## 2022-03-08 RX ORDER — CEFEPIME 1 G/1
2000 INJECTION, POWDER, FOR SOLUTION INTRAMUSCULAR; INTRAVENOUS ONCE
Refills: 0 | Status: COMPLETED | OUTPATIENT
Start: 2022-03-08 | End: 2022-03-08

## 2022-03-08 RX ORDER — SODIUM CHLORIDE 9 MG/ML
1000 INJECTION, SOLUTION INTRAVENOUS ONCE
Refills: 0 | Status: COMPLETED | OUTPATIENT
Start: 2022-03-08 | End: 2022-03-08

## 2022-03-08 RX ORDER — METRONIDAZOLE 500 MG
TABLET ORAL
Refills: 0 | Status: DISCONTINUED | OUTPATIENT
Start: 2022-03-08 | End: 2022-03-09

## 2022-03-08 RX ORDER — CEFEPIME 1 G/1
2000 INJECTION, POWDER, FOR SOLUTION INTRAMUSCULAR; INTRAVENOUS EVERY 12 HOURS
Refills: 0 | Status: DISCONTINUED | OUTPATIENT
Start: 2022-03-08 | End: 2022-03-08

## 2022-03-08 RX ORDER — VANCOMYCIN HCL 1 G
1250 VIAL (EA) INTRAVENOUS EVERY 12 HOURS
Refills: 0 | Status: DISCONTINUED | OUTPATIENT
Start: 2022-03-08 | End: 2022-03-08

## 2022-03-08 RX ORDER — METRONIDAZOLE 500 MG
500 TABLET ORAL ONCE
Refills: 0 | Status: COMPLETED | OUTPATIENT
Start: 2022-03-08 | End: 2022-03-08

## 2022-03-08 RX ORDER — VANCOMYCIN HCL 1 G
1500 VIAL (EA) INTRAVENOUS ONCE
Refills: 0 | Status: COMPLETED | OUTPATIENT
Start: 2022-03-08 | End: 2022-03-08

## 2022-03-08 RX ADMIN — CEFEPIME 2000 MILLIGRAM(S): 1 INJECTION, POWDER, FOR SOLUTION INTRAMUSCULAR; INTRAVENOUS at 19:54

## 2022-03-08 RX ADMIN — Medication 11.9 MICROGRAM(S)/KG/MIN: at 13:52

## 2022-03-08 RX ADMIN — HYDROMORPHONE HYDROCHLORIDE 0.5 MILLIGRAM(S): 2 INJECTION INTRAMUSCULAR; INTRAVENOUS; SUBCUTANEOUS at 23:21

## 2022-03-08 RX ADMIN — SODIUM CHLORIDE 150 MILLILITER(S): 9 INJECTION, SOLUTION INTRAVENOUS at 17:18

## 2022-03-08 RX ADMIN — SODIUM CHLORIDE 150 MILLILITER(S): 9 INJECTION, SOLUTION INTRAVENOUS at 23:21

## 2022-03-08 RX ADMIN — Medication 3 MILLILITER(S): at 21:15

## 2022-03-08 RX ADMIN — Medication 100 MILLIGRAM(S): at 22:09

## 2022-03-08 RX ADMIN — Medication 11.9 MICROGRAM(S)/KG/MIN: at 20:32

## 2022-03-08 RX ADMIN — Medication 100 MILLIGRAM(S): at 17:18

## 2022-03-08 RX ADMIN — SODIUM CHLORIDE 2000 MILLILITER(S): 9 INJECTION, SOLUTION INTRAVENOUS at 12:40

## 2022-03-08 RX ADMIN — HEPARIN SODIUM 7500 UNIT(S): 5000 INJECTION INTRAVENOUS; SUBCUTANEOUS at 17:09

## 2022-03-08 RX ADMIN — CEFEPIME 100 MILLIGRAM(S): 1 INJECTION, POWDER, FOR SOLUTION INTRAMUSCULAR; INTRAVENOUS at 13:21

## 2022-03-08 RX ADMIN — SODIUM CHLORIDE 1000 MILLILITER(S): 9 INJECTION, SOLUTION INTRAVENOUS at 19:59

## 2022-03-08 RX ADMIN — Medication 300 MILLIGRAM(S): at 14:10

## 2022-03-08 RX ADMIN — Medication 1500 MILLIGRAM(S): at 19:54

## 2022-03-08 RX ADMIN — HYDROMORPHONE HYDROCHLORIDE 0.5 MILLIGRAM(S): 2 INJECTION INTRAMUSCULAR; INTRAVENOUS; SUBCUTANEOUS at 20:32

## 2022-03-08 RX ADMIN — HEPARIN SODIUM 7500 UNIT(S): 5000 INJECTION INTRAVENOUS; SUBCUTANEOUS at 22:09

## 2022-03-08 NOTE — ED ADULT NURSE REASSESSMENT NOTE - NS ED NURSE REASSESS COMMENT FT1
Pt BP 83/52. Fluids given. As per MD order, pt started on levophed drip, see EMAR. 0.1mcg/kg/min. Will continue to monitor.

## 2022-03-08 NOTE — HISTORY OF PRESENT ILLNESS
[FreeTextEntry1] : 65 yo M for follow up following scrotal exploration and orchiectomy\par \par had chronic abscess and infection in the right scrotum, as well as a very large hydrocele\par 3/1/2022 had scrotal exploration with severe adhesions and ultimately needed a right orchiectomy\par here for follow up \par \par does not have any fever\par on exam the right scrotum is swollen, no signs of irritation \par explained he may have a collection of fluid/hematoma or infection and may need I&D\par \par patient is still on abx treatment\par referred to the ER now to have a CT and be admitted

## 2022-03-08 NOTE — CONSULT NOTE ADULT - SUBJECTIVE AND OBJECTIVE BOX
HPI: 66YM POD 7 from right orchiectomy, in which hydrocele was drained and washed out. Patient reports 5 days of L scrotal pain and swelling, associated nausea and low grade fever last night.  Patient was seen for his follow up visit today and was sent to the ER for concerning of L scrotal swelling. Patient arrived in the ER hypotensive, febrile however, no associated tachycardia.  Patient also reports oliguria at home.  Labs reveal leukocytosis, SAVANNA and lactic acidosis. Pending CT pelvis/scrotum.         MEDICATIONS  (STANDING):  vancomycin  IVPB 1500 milliGRAM(s) IV Intermittent Once    MEDICATIONS  (PRN):      Vital Signs Last 24 Hrs  T(C): 37.8 (08 Mar 2022 12:55), Max: 37.8 (08 Mar 2022 12:55)  T(F): 100.1 (08 Mar 2022 12:55), Max: 100.1 (08 Mar 2022 12:55)  HR: 86 (08 Mar 2022 13:30) (86 - 119)  BP: 80/54 (08 Mar 2022 13:30) (71/47 - 85/61)  BP(mean): 62 (08 Mar 2022 13:30) (55 - 63)  RR: 16 (08 Mar 2022 11:50) (16 - 16)  SpO2: 96% (08 Mar 2022 11:50) (96% - 96%)    Physical Exam:    Neurological:  No sensory/motor deficit  Respiratory: Unlabored, no accessory muscle use  Cardiovascular: NSR  Gastrointestinal: Obese, soft, non-tender, normal bowel sounds  : Significant scrotal edema, erythematous, tender to palpation, incision site with small opening of purulent drainage, area of flatulence and near by tense area.  Pain worse with palpation or elevation. No crepitus, air or necrosis appreciated   Extremities: No peripheral edema, No cyanosis, clubbing   Vascular: diminished peripheral pulses, >3secs       I&O's Detail      LABS:                        12.5   22.56 )-----------( 265      ( 08 Mar 2022 12:35 )             38.7     03-08    135  |  96<L>  |  33.3<H>  ----------------------------<  217<H>  3.6   |  22.0  |  3.45<H>    Ca    8.5<L>      08 Mar 2022 12:35    TPro  7.0  /  Alb  3.2<L>  /  TBili  1.3  /  DBili  x   /  AST  10  /  ALT  18  /  AlkPhos  95  03-08    PT/INR - ( 08 Mar 2022 12:35 )   PT: 14.0 sec;   INR: 1.20 ratio         PTT - ( 08 Mar 2022 12:35 )  PTT:28.9 sec      RADIOLOGY & ADDITIONAL STUDIES: HPI: 66YM POD 7 from right orchiectomy, in which hydrocele was drained and washed out. Patient reports 5 days of L scrotal pain and swelling, associated nausea and low grade fever last night.  Patient was seen for his follow up visit today and was sent to the ER for concerning of L scrotal swelling. Patient arrived in the ER hypotensive, a febrile however, no associated tachycardia.  Patient also reports oliguria at home.  Labs reveal leukocytosis, SAVANNA and lactic acidosis. Pending CT pelvis/scrotum.         MEDICATIONS  (STANDING):  vancomycin  IVPB 1500 milliGRAM(s) IV Intermittent Once    MEDICATIONS  (PRN):      Vital Signs Last 24 Hrs  T(C): 37.8 (08 Mar 2022 12:55), Max: 37.8 (08 Mar 2022 12:55)  T(F): 100.1 (08 Mar 2022 12:55), Max: 100.1 (08 Mar 2022 12:55)  HR: 86 (08 Mar 2022 13:30) (86 - 119)  BP: 80/54 (08 Mar 2022 13:30) (71/47 - 85/61)  BP(mean): 62 (08 Mar 2022 13:30) (55 - 63)  RR: 16 (08 Mar 2022 11:50) (16 - 16)  SpO2: 96% (08 Mar 2022 11:50) (96% - 96%)    Physical Exam:    Neurological:  No sensory/motor deficit  Respiratory: Unlabored, no accessory muscle use  Cardiovascular: NSR  Gastrointestinal: Obese, soft, non-tender, normal bowel sounds  : Significant scrotal edema, erythematous, tender to palpation, incision site with small opening of purulent drainage, area of flatulence and near by tense area.  Pain worse with palpation or elevation. No crepitus, air or necrosis appreciated   Extremities: No peripheral edema, No cyanosis, clubbing   Vascular: diminished peripheral pulses, >3secs       I&O's Detail      LABS:                        12.5   22.56 )-----------( 265      ( 08 Mar 2022 12:35 )             38.7     03-08    135  |  96<L>  |  33.3<H>  ----------------------------<  217<H>  3.6   |  22.0  |  3.45<H>    Ca    8.5<L>      08 Mar 2022 12:35    TPro  7.0  /  Alb  3.2<L>  /  TBili  1.3  /  DBili  x   /  AST  10  /  ALT  18  /  AlkPhos  95  03-08    PT/INR - ( 08 Mar 2022 12:35 )   PT: 14.0 sec;   INR: 1.20 ratio         PTT - ( 08 Mar 2022 12:35 )  PTT:28.9 sec      RADIOLOGY & ADDITIONAL STUDIES:

## 2022-03-08 NOTE — PROVIDER CONTACT NOTE (OTHER) - SITUATION
inserted Peters catheter.  Immediate urine return.  Pt c/o pain when attempting to inflate balloon.  Dr. Javier to bedside.

## 2022-03-08 NOTE — ED PROVIDER NOTE - NSICDXPASTMEDICALHX_GEN_ALL_CORE_FT
PAST MEDICAL HISTORY:  COPD, mild     Epididymo-orchitis     HTN (hypertension)     Prostate CA s/p radiation 2013

## 2022-03-08 NOTE — ED ADULT NURSE REASSESSMENT NOTE - NS ED NURSE REASSESS COMMENT FT1
received report from Arabella SALDAÑA and Haylie SALDAÑA, MD Jimenez at bedside placing central line, family member at bedside, pt's HR is 86, ox is 98 room air, and RR is 20 received report from Arabella SALDAÑA and Haylie SALDAÑA, MD Jimenez at bedside placing central line, pt has currently 600 mL's of yellow urine drained into Peters bag, pt has normosol running at 150 mL/hour, norepinephrine running at 0.1 mcgs/kig/min, family member at bedside, pt's HR is 86, ox is 98 room air, and RR is 20

## 2022-03-08 NOTE — PROVIDER CONTACT NOTE (OTHER) - ACTION/TREATMENT ORDERED:
Dr. Javier visualized catheter in bladder via bedside ultrasound.  Balloon inflated. Pt no longer c/o pain.

## 2022-03-08 NOTE — ED PROCEDURE NOTE - PROCEDURE ADDITIONAL DETAILS
Triple lumen catheter placed in RIGHT internal jugular. Seldinger & sterile technique used. Good draw back and flush. X-ray confirms placement w/o concern of pneumothorax.

## 2022-03-08 NOTE — CONSULT NOTE ADULT - ASSESSMENT
A/p: 66YM POD 7 from right orchiectomy, in which hydrocele was drained and washed out. Now presents to the ER with sepsis and L scrotal swelling. Concern for abscess vs. necrotizing soft tissues infection given shock.     -Continue adequate hydration and resuscitation especially given SAVANNA  -Blood cultures, UA, sepsis work up  -R/o necrotizing soft tissue infection vs abscess. F/u CT pelvis/scrotum to confirm  -Plan for possible I &D  -Broaden antibiotics   -Admit to medicine, if needed consult MICU given shock     Plan discussed with Dr. Parekh  A/p: 66YM POD 7 from right orchiectomy, in which hydrocele was drained and washed out. Now presents to the ER with sepsis and L scrotal swelling. Concern for abscess vs. necrotizing soft tissues infection given shock.     -Continue adequate hydration and resuscitation especially given SAVANNA  -Blood cultures, UA, sepsis work up  -R/o necrotizing soft tissue infection vs abscess. F/u CT pelvis/scrotum to confirm  -Plan for possible I &D  -Broaden antibiotics   -Admit to SICU,   Plan discussed with Dr. Parekh

## 2022-03-08 NOTE — ED ADULT NURSE NOTE - OBJECTIVE STATEMENT
Pt presents to ED c/o scrotal swelling and pain post op. Pt had right testicle removed 1 week ago for hydrocele. Pt reports increasing scrotal swelling and pain and a fever that he is taking ibuprofen for. Pt denies SOB, chest pain, abdominal pain, and n/v. A/O x3. Respirations are even and unlabored. SPO2 99% on room air. Pt educated on plan of care and expresses understanding. Pt presents to ED c/o scrotal swelling and pain post op. Pt had right testicle removed 1 week ago. Pt reports increasing scrotal swelling and pain and a fever that he is taking ibuprofen for. Pt denies SOB, chest pain, abdominal pain, and n/v. A/O x3. Respirations are even and unlabored. SPO2 99% on room air. Pt educated on plan of care and expresses understanding.

## 2022-03-08 NOTE — CONSULT NOTE ADULT - SUBJECTIVE AND OBJECTIVE BOX
This is a 66M s/p scrotal abscesses drainage and orchectomy on 3/1, presents today with fever/chills, increasing pain and swelling to the scrotum This is a 66M s/p scrotal abscesses drainage and orchectomy on 3/1, presents today with fever/chills, increasing pain and swelling to the scrotum, pain was worse with standing and movement and improved with rest. SICU called for the management of septic shock. Upon exam, Scrotum is swollen, tense, and tender to touch, surrounding soft tissues have no erythema or crepitous. Initial lactate 3.2 now 2.3 with resuscitation, currently on levophed to maintain BP    PAST MEDICAL & SURGICAL HISTORY:  HTN (hypertension)    COPD, mild    Prostate CA  s/p radiation 2013    Epididymo-orchitis    History of appendectomy  2000    History of colon resection  2012    Home Medications:  aspirin 81 mg oral delayed release tablet: 1 tab(s) orally once a day (01 Mar 2022 08:46)  hydroCHLOROthiazide 25 mg oral tablet: 1 tab(s) orally once a day (01 Mar 2022 08:46)  losartan 100 mg oral tablet: 1 tab(s) orally once a day (01 Mar 2022 08:46)  montelukast 10 mg oral tablet: 1 tab(s) orally once a day (01 Mar 2022 08:46)  simvastatin 40 mg oral tablet: 1 tab(s) orally once a day (at bedtime) (01 Mar 2022 08:46)  tamsulosin 0.4 mg oral capsule: 1 cap(s) orally once a day (01 Mar 2022 08:46)    MEDICATIONS  (STANDING):  cefepime   IVPB 1000 milliGRAM(s) IV Intermittent every 12 hours  heparin   Injectable 7500 Unit(s) SubCutaneous every 8 hours  metroNIDAZOLE  IVPB      metroNIDAZOLE  IVPB 500 milliGRAM(s) IV Intermittent once  metroNIDAZOLE  IVPB 500 milliGRAM(s) IV Intermittent every 8 hours  multiple electrolytes Injection Type 1 1000 milliLiter(s) (150 mL/Hr) IV Continuous <Continuous>  norepinephrine Infusion 0.05 MICROgram(s)/kG/Min (11.9 mL/Hr) IV Continuous <Continuous>    MEDICATIONS  (PRN):    ICU Vital Signs Last 24 Hrs  T(C): 36.9 (08 Mar 2022 14:55), Max: 37.8 (08 Mar 2022 12:55)  T(F): 98.4 (08 Mar 2022 14:55), Max: 100.1 (08 Mar 2022 12:55)  HR: 82 (08 Mar 2022 15:45) (81 - 119)  BP: 96/60 (08 Mar 2022 15:45) (71/47 - 109/66)  BP(mean): 71 (08 Mar 2022 15:45) (55 - 81)  ABP: --  ABP(mean): --  RR: 18 (08 Mar 2022 14:55) (16 - 18)  SpO2: 99% (08 Mar 2022 14:55) (96% - 99%)    PHYSICAL EXAM:      Constitutional: Alert, NAD    Eyes: EOMI, no icterus     Neck: no JVD    Respiratory: CTA b/l    Cardiovascular: S1S2, Sinus, on levophed    Gastrointestinal: soft NT ND, well healed midline scar and scar at McBurney's point    Genitourinary: Penis and scrotum are edematous, tense and tender to touch    Rectal: no imtiaz-anal erythema    Extremities: warm, trace edema    Vascular: distal pulses palable    Neurological: non focal     Skin: warm and dry    Psychiatric: normal affect    CBC Full  -  ( 08 Mar 2022 12:35 )  WBC Count : 22.56 K/uL  RBC Count : 3.83 M/uL  Hemoglobin : 12.5 g/dL  Hematocrit : 38.7 %  Platelet Count - Automated : 265 K/uL  Mean Cell Volume : 101.0 fl  Mean Cell Hemoglobin : 32.6 pg  Mean Cell Hemoglobin Concentration : 32.3 gm/dL  Auto Neutrophil # : 19.00 K/uL  Auto Lymphocyte # : 1.20 K/uL  Auto Monocyte # : 0.99 K/uL  Auto Eosinophil # : 0.00 K/uL  Auto Basophil # : 0.00 K/uL  Auto Neutrophil % : 82.5 %  Auto Lymphocyte % : 5.3 %  Auto Monocyte % : 4.4 %  Auto Eosinophil % : 0.0 %  Auto Basophil % : 0.0 %    03-08    135  |  96<L>  |  33.3<H>  ----------------------------<  217<H>  3.6   |  22.0  |  3.45<H>    Ca    8.5<L>      08 Mar 2022 12:35    TPro  7.0  /  Alb  3.2<L>  /  TBili  1.3  /  DBili  x   /  AST  10  /  ALT  18  /  AlkPhos  95  03-08    Lactate: 2.3

## 2022-03-08 NOTE — PHYSICAL EXAM
[General Appearance - Well Developed] : well developed [General Appearance - Well Nourished] : well nourished [Normal Appearance] : normal appearance [Well Groomed] : well groomed [General Appearance - In No Acute Distress] : no acute distress [FreeTextEntry1] : see HPI [Oriented To Time, Place, And Person] : oriented to person, place, and time [Affect] : the affect was normal [Mood] : the mood was normal [Not Anxious] : not anxious

## 2022-03-08 NOTE — ED PROVIDER NOTE - OBJECTIVE STATEMENT
67 yo male with hx of epidydimo-orchitis, prostate cancer, htn, copd presents to the ED for scrotal swelling. 7 days ago, patient has right testicle removed for hydrocele. Now patient =is presenting with worsening scrotal swelling and pain. Subjective fever at home that breaks wuith ibuprofen he is taking for pain. Denies abdominal pain, N/V. 67 yo male with hx of epididymo-orchitis, prostate cancer, htn, copd presents to the ED for scrotal swelling. 7 days ago, patient has right testicle removed for hydrocele. Now patient is presenting with worsening scrotal swelling and pain. Subjective fever at home that breaks with ibuprofen he is taking for pain. Denies abdominal pain, N/V. 65 yo male with hx of epididymo-orchitis, prostate cancer, htn, copd presents to the ED for scrotal swelling. 7 days ago, patient has right testicle removed for hydrocele. Now patient is presenting with worsening scrotal swelling and pain. some mild bleeding from surgical site but no drainage. Subjective fever at home that breaks with ibuprofen he is taking for pain. Denies abdominal pain, N/V.

## 2022-03-08 NOTE — CONSULT NOTE ADULT - ASSESSMENT
66M POD 7 from drainage of scrotal abscess and orchectomy, now with septic shock    -Admit to SICU  - FU CT pelvis and US  -continue IVF resuscitation   - Patient has received cefepime and vanco, add flagyl for anaerobic coverage  -FU blood CX  -FU urology for possible operative intervention after imaging is reviewed. 66M POD 7 from drainage of scrotal abscess and orchectomy, now with septic shock, evidence of end organ dysfunction acute kidney injury present on admission, elevated INR    -Admit to SICU  - FU CT pelvis and US  - continue IVF resuscitation for volume expansion, pre-renal azotemia related to hypovolemia  - Patient has received cefepime and vanco, add flagyl for anaerobic coverage  -FU blood CX  -FU urology for possible operative intervention after imaging is reviewed.

## 2022-03-08 NOTE — ED PROVIDER NOTE - PROGRESS NOTE DETAILS
near completion of 2L fluid, BP still with MAP <65, mentating, noted with acute kidney injury. levophed started. will discuss with Dr. SHAW- [facility] MICU vs SICU -Concepcion OSBORNE

## 2022-03-08 NOTE — ED PROVIDER NOTE - ATTENDING CONTRIBUTION TO CARE
I, Sunita Javier, have personally seen and examined this patient. I have fully participated in the care of this patient. I have reviewed all pertinent clinical information, including history, physical exam, plan and the Resident's note and agree except as noted below.     patient critically ill requiring medications with intensive monitoring, discussion with consultants, discussion with patient and family, interpretation of diagnostic studies.     Pt s/p rt orchiectomy with worsening swelling/drainage and subjective fevers. tachycardic/hypotensive on arrival. no significant distress. will tx as sepsis. sono/ct. discuss with Uro.

## 2022-03-08 NOTE — ED ADULT TRIAGE NOTE - CHIEF COMPLAINT QUOTE
Pt had right testicle removal done on 3/1/22 and now has a lot of swelling. Was told to come in for evaluation.

## 2022-03-08 NOTE — ED PROVIDER NOTE - CLINICAL SUMMARY MEDICAL DECISION MAKING FREE TEXT BOX
67 yo male with scrotal edema and pain. Tachycardic and hypotensive on arrival. patient well appearing and mentating well. Will give IV fluid, abx. Order labs. 67 yo male with scrotal edema and pain. Tachycardic and hypotensive on arrival. patient well appearing and mentating well. Will give IV fluid, abx. labs. sepsis w/u. US vs CT will discus with Urology Dr. Parekh

## 2022-03-08 NOTE — CONSULT NOTE ADULT - ATTENDING COMMENTS
agree with assessment  will plan for incision and drainage of the abscess  will need further care and monitoring
Pt seen and examined with team.    Gen:  Uncomfortable, but conversant and pleasant  :  Markedly swollen penis with wheeler in place, tense, swollen tender and enlarged scrotum. Suture line appears intact without visible or gently expressible purulent drainage    As above:  66 year old gentleman w/hx of HTN, obesity, "mild" COPD and s/p scrotal abscess drainage w/orchiectomy on 3/1 (outpatient) who presents (3/8/22) with increasing pain and swelling of scrotum, and chills (temperature unmeasured).  Noted to be hypotensive w/septic shock.  SAVANNA.  Agree with admission to critical care setting for vasopressors, fluid resuscitation, IV antibiotics and CT scan for further evaluation of scrotum.  Urology evaluation with likely return to OR for I&D.  Initial POCUS suggested fluid responsiveness.  Initial lactate elevated - improved to 2.3 after resuscitation.

## 2022-03-08 NOTE — ED PROVIDER NOTE - GENITOURINARY, MLM
Scrotum edematous and firm. Small wound on underside. Scrotum edematous and firm. Small wound on underside with sanguineus drainage

## 2022-03-08 NOTE — PATIENT PROFILE ADULT - FALL HARM RISK - HARM RISK INTERVENTIONS

## 2022-03-09 LAB
ANION GAP SERPL CALC-SCNC: 14 MMOL/L — SIGNIFICANT CHANGE UP (ref 5–17)
BUN SERPL-MCNC: 31.7 MG/DL — HIGH (ref 8–20)
CALCIUM SERPL-MCNC: 7.9 MG/DL — LOW (ref 8.6–10.2)
CHLORIDE SERPL-SCNC: 99 MMOL/L — SIGNIFICANT CHANGE UP (ref 98–107)
CO2 SERPL-SCNC: 24 MMOL/L — SIGNIFICANT CHANGE UP (ref 22–29)
CREAT SERPL-MCNC: 2.11 MG/DL — HIGH (ref 0.5–1.3)
CULTURE RESULTS: NO GROWTH — SIGNIFICANT CHANGE UP
EGFR: 33 ML/MIN/1.73M2 — LOW
GLUCOSE SERPL-MCNC: 129 MG/DL — HIGH (ref 70–99)
HCT VFR BLD CALC: 32.7 % — LOW (ref 39–50)
HGB BLD-MCNC: 10.8 G/DL — LOW (ref 13–17)
MAGNESIUM SERPL-MCNC: 1.8 MG/DL — SIGNIFICANT CHANGE UP (ref 1.6–2.6)
MCHC RBC-ENTMCNC: 32.9 PG — SIGNIFICANT CHANGE UP (ref 27–34)
MCHC RBC-ENTMCNC: 33 GM/DL — SIGNIFICANT CHANGE UP (ref 32–36)
MCV RBC AUTO: 99.7 FL — SIGNIFICANT CHANGE UP (ref 80–100)
PHOSPHATE SERPL-MCNC: 2.5 MG/DL — SIGNIFICANT CHANGE UP (ref 2.4–4.7)
PLATELET # BLD AUTO: 235 K/UL — SIGNIFICANT CHANGE UP (ref 150–400)
POTASSIUM SERPL-MCNC: 3.4 MMOL/L — LOW (ref 3.5–5.3)
POTASSIUM SERPL-SCNC: 3.4 MMOL/L — LOW (ref 3.5–5.3)
RBC # BLD: 3.28 M/UL — LOW (ref 4.2–5.8)
RBC # FLD: 13.6 % — SIGNIFICANT CHANGE UP (ref 10.3–14.5)
SODIUM SERPL-SCNC: 137 MMOL/L — SIGNIFICANT CHANGE UP (ref 135–145)
SPECIMEN SOURCE: SIGNIFICANT CHANGE UP
WBC # BLD: 14.61 K/UL — HIGH (ref 3.8–10.5)
WBC # FLD AUTO: 14.61 K/UL — HIGH (ref 3.8–10.5)

## 2022-03-09 PROCEDURE — 10061 I&D ABSCESS COMP/MULTIPLE: CPT | Mod: 58

## 2022-03-09 PROCEDURE — 99291 CRITICAL CARE FIRST HOUR: CPT

## 2022-03-09 RX ORDER — HYDROMORPHONE HYDROCHLORIDE 2 MG/ML
0.5 INJECTION INTRAMUSCULAR; INTRAVENOUS; SUBCUTANEOUS EVERY 4 HOURS
Refills: 0 | Status: DISCONTINUED | OUTPATIENT
Start: 2022-03-09 | End: 2022-03-10

## 2022-03-09 RX ORDER — ACETAMINOPHEN 500 MG
1000 TABLET ORAL ONCE
Refills: 0 | Status: COMPLETED | OUTPATIENT
Start: 2022-03-09 | End: 2022-03-09

## 2022-03-09 RX ORDER — ACETAMINOPHEN 500 MG
650 TABLET ORAL EVERY 6 HOURS
Refills: 0 | Status: DISCONTINUED | OUTPATIENT
Start: 2022-03-09 | End: 2022-03-24

## 2022-03-09 RX ORDER — IPRATROPIUM/ALBUTEROL SULFATE 18-103MCG
3 AEROSOL WITH ADAPTER (GRAM) INHALATION EVERY 6 HOURS
Refills: 0 | Status: DISCONTINUED | OUTPATIENT
Start: 2022-03-09 | End: 2022-03-24

## 2022-03-09 RX ORDER — CHLORHEXIDINE GLUCONATE 213 G/1000ML
1 SOLUTION TOPICAL DAILY
Refills: 0 | Status: DISCONTINUED | OUTPATIENT
Start: 2022-03-09 | End: 2022-03-24

## 2022-03-09 RX ORDER — CEFEPIME 1 G/1
1000 INJECTION, POWDER, FOR SOLUTION INTRAMUSCULAR; INTRAVENOUS EVERY 12 HOURS
Refills: 0 | Status: DISCONTINUED | OUTPATIENT
Start: 2022-03-09 | End: 2022-03-10

## 2022-03-09 RX ORDER — HEPARIN SODIUM 5000 [USP'U]/ML
7500 INJECTION INTRAVENOUS; SUBCUTANEOUS EVERY 8 HOURS
Refills: 0 | Status: DISCONTINUED | OUTPATIENT
Start: 2022-03-09 | End: 2022-03-24

## 2022-03-09 RX ORDER — MAGNESIUM SULFATE 500 MG/ML
2 VIAL (ML) INJECTION ONCE
Refills: 0 | Status: COMPLETED | OUTPATIENT
Start: 2022-03-09 | End: 2022-03-09

## 2022-03-09 RX ORDER — SODIUM CHLORIDE 9 MG/ML
1000 INJECTION, SOLUTION INTRAVENOUS ONCE
Refills: 0 | Status: COMPLETED | OUTPATIENT
Start: 2022-03-09 | End: 2022-03-09

## 2022-03-09 RX ORDER — METRONIDAZOLE 500 MG
500 TABLET ORAL EVERY 8 HOURS
Refills: 0 | Status: DISCONTINUED | OUTPATIENT
Start: 2022-03-09 | End: 2022-03-14

## 2022-03-09 RX ORDER — VANCOMYCIN HCL 1 G
750 VIAL (EA) INTRAVENOUS EVERY 12 HOURS
Refills: 0 | Status: DISCONTINUED | OUTPATIENT
Start: 2022-03-09 | End: 2022-03-09

## 2022-03-09 RX ORDER — VANCOMYCIN HCL 1 G
750 VIAL (EA) INTRAVENOUS
Refills: 0 | Status: DISCONTINUED | OUTPATIENT
Start: 2022-03-10 | End: 2022-03-10

## 2022-03-09 RX ORDER — POTASSIUM CHLORIDE 20 MEQ
20 PACKET (EA) ORAL
Refills: 0 | Status: COMPLETED | OUTPATIENT
Start: 2022-03-09 | End: 2022-03-09

## 2022-03-09 RX ADMIN — HYDROMORPHONE HYDROCHLORIDE 0.5 MILLIGRAM(S): 2 INJECTION INTRAMUSCULAR; INTRAVENOUS; SUBCUTANEOUS at 05:05

## 2022-03-09 RX ADMIN — CEFEPIME 100 MILLIGRAM(S): 1 INJECTION, POWDER, FOR SOLUTION INTRAMUSCULAR; INTRAVENOUS at 05:06

## 2022-03-09 RX ADMIN — Medication 400 MILLIGRAM(S): at 11:15

## 2022-03-09 RX ADMIN — HYDROMORPHONE HYDROCHLORIDE 0.5 MILLIGRAM(S): 2 INJECTION INTRAMUSCULAR; INTRAVENOUS; SUBCUTANEOUS at 09:14

## 2022-03-09 RX ADMIN — SODIUM CHLORIDE 1000 MILLILITER(S): 9 INJECTION, SOLUTION INTRAVENOUS at 17:34

## 2022-03-09 RX ADMIN — Medication 100 MILLIGRAM(S): at 21:18

## 2022-03-09 RX ADMIN — CEFEPIME 100 MILLIGRAM(S): 1 INJECTION, POWDER, FOR SOLUTION INTRAMUSCULAR; INTRAVENOUS at 18:45

## 2022-03-09 RX ADMIN — Medication 100 MILLIGRAM(S): at 14:10

## 2022-03-09 RX ADMIN — Medication 3 MILLILITER(S): at 21:19

## 2022-03-09 RX ADMIN — Medication 1000 MILLIGRAM(S): at 11:45

## 2022-03-09 RX ADMIN — HYDROMORPHONE HYDROCHLORIDE 0.5 MILLIGRAM(S): 2 INJECTION INTRAMUSCULAR; INTRAVENOUS; SUBCUTANEOUS at 13:03

## 2022-03-09 RX ADMIN — Medication 250 MILLIGRAM(S): at 23:26

## 2022-03-09 RX ADMIN — Medication 100 MILLIGRAM(S): at 05:06

## 2022-03-09 RX ADMIN — Medication 1000 MILLIGRAM(S): at 01:11

## 2022-03-09 RX ADMIN — HYDROMORPHONE HYDROCHLORIDE 0.5 MILLIGRAM(S): 2 INJECTION INTRAMUSCULAR; INTRAVENOUS; SUBCUTANEOUS at 17:57

## 2022-03-09 RX ADMIN — Medication 400 MILLIGRAM(S): at 01:11

## 2022-03-09 RX ADMIN — Medication 250 MILLIGRAM(S): at 00:35

## 2022-03-09 RX ADMIN — HEPARIN SODIUM 7500 UNIT(S): 5000 INJECTION INTRAVENOUS; SUBCUTANEOUS at 05:36

## 2022-03-09 RX ADMIN — HYDROMORPHONE HYDROCHLORIDE 0.5 MILLIGRAM(S): 2 INJECTION INTRAMUSCULAR; INTRAVENOUS; SUBCUTANEOUS at 18:12

## 2022-03-09 RX ADMIN — Medication 3 MILLILITER(S): at 09:39

## 2022-03-09 RX ADMIN — Medication 50 MILLIEQUIVALENT(S): at 07:20

## 2022-03-09 RX ADMIN — HYDROMORPHONE HYDROCHLORIDE 0.5 MILLIGRAM(S): 2 INJECTION INTRAMUSCULAR; INTRAVENOUS; SUBCUTANEOUS at 13:18

## 2022-03-09 RX ADMIN — Medication 50 MILLIEQUIVALENT(S): at 09:56

## 2022-03-09 RX ADMIN — HEPARIN SODIUM 7500 UNIT(S): 5000 INJECTION INTRAVENOUS; SUBCUTANEOUS at 21:18

## 2022-03-09 RX ADMIN — Medication 3 MILLILITER(S): at 04:22

## 2022-03-09 RX ADMIN — CHLORHEXIDINE GLUCONATE 1 APPLICATION(S): 213 SOLUTION TOPICAL at 20:28

## 2022-03-09 RX ADMIN — Medication 25 GRAM(S): at 05:42

## 2022-03-09 RX ADMIN — HYDROMORPHONE HYDROCHLORIDE 0.5 MILLIGRAM(S): 2 INJECTION INTRAMUSCULAR; INTRAVENOUS; SUBCUTANEOUS at 22:05

## 2022-03-09 RX ADMIN — HYDROMORPHONE HYDROCHLORIDE 0.5 MILLIGRAM(S): 2 INJECTION INTRAMUSCULAR; INTRAVENOUS; SUBCUTANEOUS at 08:57

## 2022-03-09 RX ADMIN — HEPARIN SODIUM 7500 UNIT(S): 5000 INJECTION INTRAVENOUS; SUBCUTANEOUS at 14:11

## 2022-03-09 RX ADMIN — Medication 50 MILLIEQUIVALENT(S): at 11:15

## 2022-03-09 NOTE — PROGRESS NOTE ADULT - SUBJECTIVE AND OBJECTIVE BOX
Subjective:66yMale s/p recent right orchiectomy, pt admitted yesterday with sepsis.  Pt feeling better today, few c/o pain in scrotum.  Pt now making urine, on levo.    Peters: yellow    Vital Signs Last 24 Hrs  T(C): 37.5 (09 Mar 2022 07:43), Max: 38.1 (09 Mar 2022 01:00)  T(F): 99.5 (09 Mar 2022 07:43), Max: 100.6 (09 Mar 2022 01:00)  HR: 115 (09 Mar 2022 08:00) (79 - 122)  BP: 125/69 (09 Mar 2022 08:00) (71/47 - 137/84)  BP(mean): 87 (09 Mar 2022 08:00) (55 - 124)  RR: 21 (09 Mar 2022 08:00) (16 - 26)  SpO2: 93% (09 Mar 2022 08:00) (92% - 100%)  I&O's Detail    08 Mar 2022 07:01  -  09 Mar 2022 07:00  --------------------------------------------------------  IN:    IV PiggyBack: 200 mL    IV PiggyBack: 250 mL    IV PiggyBack: 100 mL    multiple electrolytes Injection Type 1 Bolus: 1000 mL    multiple electrolytes Injection Type 1.: 1650 mL    Norepinephrine: 123.8 mL  Total IN: 3323.8 mL    OUT:    Ureteral Catheter (mL): 1912 mL  Total OUT: 1912 mL    Total NET: 1411.8 mL      09 Mar 2022 07:01  -  09 Mar 2022 09:07  --------------------------------------------------------  IN:  Total IN: 0 mL    OUT:    Norepinephrine: 0 mL  Total OUT: 0 mL    Total NET: 0 mL          Labs:                        10.8   14.61 )-----------( 235      ( 09 Mar 2022 03:46 )             32.7     03-09    137  |  99  |  31.7<H>  ----------------------------<  129<H>  3.4<L>   |  24.0  |  2.11<H>    Ca    7.9<L>      09 Mar 2022 03:46  Phos  2.5     03-09  Mg     1.8     03-09    TPro  7.0  /  Alb  3.2<L>  /  TBili  1.3  /  DBili  x   /  AST  10  /  ALT  18  /  AlkPhos  95  03-08    PT/INR - ( 08 Mar 2022 12:35 )   PT: 14.0 sec;   INR: 1.20 ratio         PTT - ( 08 Mar 2022 12:35 )  PTT:28.9 sec

## 2022-03-09 NOTE — BRIEF OPERATIVE NOTE - OPERATION/FINDINGS
purulent fluid in right side of scrotum, few areas of superficial sloughing of anterior and right side of scrotal skin, indurated scrotum with edema, penile edema

## 2022-03-09 NOTE — PROGRESS NOTE ADULT - ASSESSMENT
67 yo male pt with multiple medical issues with scrotal infection abscess s/p recent right orchiectomy after multiple infections and abscesses, SAVANNA, septic  - leukocytosis improving  - cont IVF  - cont abx  - f/u cx's  - monitor UO  - trend creatinine- improving  - OR today for I&D scrotum  - NPO for OR  - covid neg

## 2022-03-09 NOTE — PROGRESS NOTE ADULT - SUBJECTIVE AND OBJECTIVE BOX
Subjective: Patient presents with a scrotal abscess for which he admits to increasing pain and swelling. He denies any SOB or abdominal pain.       ICU Vital Signs Last 24 Hrs  T(C): 37.4 (09 Mar 2022 00:19), Max: 37.8 (08 Mar 2022 12:55)  T(F): 99.3 (09 Mar 2022 00:19), Max: 100.1 (08 Mar 2022 12:55)  HR: 122 (09 Mar 2022 01:00) (79 - 122)  BP: 133/118 (09 Mar 2022 01:00) (71/47 - 133/118)  BP(mean): 124 (09 Mar 2022 01:00) (55 - 124)  ABP: --  ABP(mean): --  RR: 21 (09 Mar 2022 01:00) (16 - 24)  SpO2: 94% (09 Mar 2022 01:00) (92% - 100%)          MEDICATIONS  (STANDING):  albuterol/ipratropium for Nebulization 3 milliLiter(s) Nebulizer every 6 hours  cefepime   IVPB 1000 milliGRAM(s) IV Intermittent every 12 hours  heparin   Injectable 7500 Unit(s) SubCutaneous every 8 hours  influenza  Vaccine (HIGH DOSE) 0.7 milliLiter(s) IntraMuscular once  metroNIDAZOLE  IVPB      metroNIDAZOLE  IVPB 500 milliGRAM(s) IV Intermittent every 8 hours  multiple electrolytes Injection Type 1 1000 milliLiter(s) (150 mL/Hr) IV Continuous <Continuous>  norepinephrine Infusion 0.05 MICROgram(s)/kG/Min (11.9 mL/Hr) IV Continuous <Continuous>  vancomycin  IVPB 750 milliGRAM(s) IV Intermittent every 24 hours    MEDICATIONS  (PRN):  acetaminophen     Tablet .. 650 milliGRAM(s) Oral every 6 hours PRN Temp greater or equal to 38C (100.4F), Mild Pain (1 - 3)  HYDROmorphone  Injectable 0.5 milliGRAM(s) IV Push every 4 hours PRN Moderate Pain (4 - 6)          Physical Exam:    Gen: Non-toxic appearing man in NAD lying in bed.    HEENT: EOMI    Neurological: Alert and oriented x3 without focal deficit.    Neck: Trachea midline, no evidence of JVD, FROM without pain, neck symmetric.    Pulmonary: Inspiratory wheezing noted b/l at apices.    Cardiovascular: RRR    Gastrointestinal: Soft, obese, non-tender, non-distended, and without guarding    : Peters in place draining yellow urine. +Scrotal edema with surrounding erythema and tender to manipulation. No purulent drainage noted from surgical incision. Penile shaft edematous.    Extremities: Without c/c/e.    Skin: Intact.    Musculoskeletal: FROM without pain, no deformity or areas of tenderness.    LABS:  Pending      ASSESSMENT/PLAN:  66y Male presenting with septic 2/2 scrotal abscess, s/p orchectomy, with increasing pain, swelling, and tenderness. Patient will be going to the OR today for drainage.     Neuro: A&Ox3. Continue multimodal analgesic regimen for pain control.     CV: Pt seemingly euvolemic, now off pressors. Continue non-invasive blood pressure monitoring.     Pulm: Continue albuterol treatment as needed. Pt unable to recall home meds for COPD, will attempt to establish a med rec later today    GI/Nutrition: NPO in preparation for procedure    /Renal: Peters for strict I&O, f/u am labs, plan for OR today    ID: Continue vanco/cefepime/flagyl.     Endo: No active issues     Skin: Repositioning for DTI prevention while in bed    Heme/DVT Prophylaxis: Continue heparin.    Lines/Tubes: PIV.    Dispo: Continue in SICU. OR today.

## 2022-03-09 NOTE — PROGRESS NOTE ADULT - ASSESSMENT
Patient is a 67 y/o male s/p right orchiectomy on 3/1, admitted yesterday in septic shock due to scrotal abscess. Now s/p I&D of scrotal abscess earlier this morning. Procedure completed without complication & pt returned to SICU post-op in stable condition. Remains in sinus tach but no longer requiring pressors to maintain BP. Pain improved. Afebrile.   - Local wound care to scrotum to be performed by urology team  - Continue broad spectrum abx with cefepime, flagyl, and vanco pending results of operative cultures  - Keep indwelling wheeler in place & monitor UOP  - Trend WBC, renal fxn, avoid nephrotoxic drugs  - SQH and SCDs for DVT ppx  - Pain control, as needed  - Ambulation as tolerated   - Encourage incentive spirometer use

## 2022-03-09 NOTE — PROGRESS NOTE ADULT - SUBJECTIVE AND OBJECTIVE BOX
POST-OP CHECK: Patient seen and examined at bedside. He is s/p incision and drainage of scrotal abscess earlier today with Dr. Parekh. Pt returned to SICU in stable condition. Has been off vasopressors since early this morning. Remains on broad spectrum abx pending culture results. Pt states scrotal pain has significantly improved and that overall he is feeling better. Denies feelings of fever / chills. Wheeler remains in place - pt denies feelings of suprapubic distension or tenderness, wheeler draining well.    MEDICATIONS  (STANDING):  albuterol/ipratropium for Nebulization 3 milliLiter(s) Nebulizer every 6 hours  cefepime   IVPB 1000 milliGRAM(s) IV Intermittent every 12 hours  heparin   Injectable 7500 Unit(s) SubCutaneous every 8 hours  influenza  Vaccine (HIGH DOSE) 0.7 milliLiter(s) IntraMuscular once  metroNIDAZOLE  IVPB 500 milliGRAM(s) IV Intermittent every 8 hours  vancomycin  IVPB 750 milliGRAM(s) IV Intermittent <User Schedule>    MEDICATIONS  (PRN):  acetaminophen     Tablet .. 650 milliGRAM(s) Oral every 6 hours PRN Temp greater or equal to 38C (100.4F), Mild Pain (1 - 3)  HYDROmorphone  Injectable 0.5 milliGRAM(s) IV Push every 4 hours PRN Moderate Pain (4 - 6)      Vital Signs Last 24 Hrs  T(C): 36.7 (09 Mar 2022 15:24), Max: 38.9 (09 Mar 2022 11:15)  T(F): 98.1 (09 Mar 2022 15:24), Max: 102 (09 Mar 2022 11:15)  HR: 107 (09 Mar 2022 14:00) (79 - 124)  BP: 100/58 (09 Mar 2022 13:00) (90/54 - 137/84)  BP(mean): 70 (09 Mar 2022 13:00) (65 - 124)  RR: 20 (09 Mar 2022 14:00) (17 - 29)  SpO2: 96% (09 Mar 2022 14:00) (92% - 100%)      Constitutional: patient appears comfortable lying in bed, in no acute distress, calm & cooperative w/ exam  Respiratory: respirations are unlabored, no accessory muscle use, no conversational dyspnea  Cardiovascular: sinus tachycardia  Gastrointestinal: Abdomen soft, NT, ND, no rebound tenderness / guarding  : scrotum is indurated and edematous, penis edematous as well, xeroform and dry dressing areas of superficial sloughing of scrotal skin, penrose in place, no active drainage, scrotum is moderately tender to palpation, indwelling wheeler catheter in place with slightly concentrated appearing yellow urine, small clots noted in tubing & collection bag, no suprapubic tenderness or distension  Neurological: A&O x 3  Skin: warm and dry, no diaphoresis, pallor, cyanosis or jaundice      I&O's Detail    08 Mar 2022 07:01  -  09 Mar 2022 07:00  --------------------------------------------------------  IN:    IV PiggyBack: 200 mL    IV PiggyBack: 250 mL    IV PiggyBack: 100 mL    multiple electrolytes Injection Type 1 Bolus: 1000 mL    multiple electrolytes Injection Type 1.: 1650 mL    Norepinephrine: 123.8 mL  Total IN: 3323.8 mL    OUT:    Ureteral Catheter (mL): 1912 mL  Total OUT: 1912 mL    Total NET: 1411.8 mL      09 Mar 2022 07:01  -  09 Mar 2022 16:17  --------------------------------------------------------  IN:    IV PiggyBack: 300 mL    IV PiggyBack: 100 mL    multiple electrolytes Injection Type 1.: 600 mL  Total IN: 1000 mL    OUT:    Norepinephrine: 0 mL    Ureteral Catheter (mL): 565 mL  Total OUT: 565 mL    Total NET: 435 mL          LABS:                        10.8   14.61 )-----------( 235      ( 09 Mar 2022 03:46 )             32.7     03-    137  |  99  |  31.7<H>  ----------------------------<  129<H>  3.4<L>   |  24.0  |  2.11<H>    Ca    7.9<L>      09 Mar 2022 03:46  Phos  2.5     03-09  Mg     1.8     03-09    TPro  7.0  /  Alb  3.2<L>  /  TBili  1.3  /  DBili  x   /  AST  10  /  ALT  18  /  AlkPhos  95  03-08    PT/INR - ( 08 Mar 2022 12:35 )   PT: 14.0 sec;   INR: 1.20 ratio         PTT - ( 08 Mar 2022 12:35 )  PTT:28.9 sec  Urinalysis Basic - ( 08 Mar 2022 15:52 )    Color: Radha / Appearance: Slightly Turbid / S.025 / pH: x  Gluc: x / Ketone: Trace  / Bili: Small / Urobili: 4   Blood: x / Protein: 30 mg/dL / Nitrite: Positive   Leuk Esterase: Moderate / RBC: 25-50 /HPF / WBC 11-25 /HPF   Sq Epi: x / Non Sq Epi: Few / Bacteria: Moderate       POST-OP CHECK: Patient seen and examined at bedside. He is s/p incision and drainage of scrotal abscess earlier today with Dr. Parekh. Pt returned to SICU in stable condition. Has been off vasopressors since early this morning. Remains on broad spectrum abx pending culture results. Pt states scrotal pain has significantly improved and that overall he is feeling better. Denies feelings of fever / chills. Wheeler remains in place - pt denies feelings of suprapubic distension or tenderness, wheeler draining well.    MEDICATIONS  (STANDING):  albuterol/ipratropium for Nebulization 3 milliLiter(s) Nebulizer every 6 hours  cefepime   IVPB 1000 milliGRAM(s) IV Intermittent every 12 hours  heparin   Injectable 7500 Unit(s) SubCutaneous every 8 hours  influenza  Vaccine (HIGH DOSE) 0.7 milliLiter(s) IntraMuscular once  metroNIDAZOLE  IVPB 500 milliGRAM(s) IV Intermittent every 8 hours  vancomycin  IVPB 750 milliGRAM(s) IV Intermittent <User Schedule>    MEDICATIONS  (PRN):  acetaminophen     Tablet .. 650 milliGRAM(s) Oral every 6 hours PRN Temp greater or equal to 38C (100.4F), Mild Pain (1 - 3)  HYDROmorphone  Injectable 0.5 milliGRAM(s) IV Push every 4 hours PRN Moderate Pain (4 - 6)      Vital Signs Last 24 Hrs  T(C): 36.7 (09 Mar 2022 15:24), Max: 38.9 (09 Mar 2022 11:15)  T(F): 98.1 (09 Mar 2022 15:24), Max: 102 (09 Mar 2022 11:15)  HR: 107 (09 Mar 2022 14:00) (79 - 124)  BP: 100/58 (09 Mar 2022 13:00) (90/54 - 137/84)  BP(mean): 70 (09 Mar 2022 13:00) (65 - 124)  RR: 20 (09 Mar 2022 14:00) (17 - 29)  SpO2: 96% (09 Mar 2022 14:00) (92% - 100%)      Constitutional: patient appears comfortable lying in bed, in no acute distress, calm & cooperative w/ exam  Respiratory: respirations are unlabored, no accessory muscle use, no conversational dyspnea  Cardiovascular: sinus tachycardia  Gastrointestinal: Abdomen soft, NT, ND, no rebound tenderness / guarding  : scrotum is indurated and edematous, penis edematous as well, xeroform and dry dressing over areas of superficial sloughing of scrotal skin, penrose in place, no active drainage, scrotum is moderately tender to palpation, indwelling wheeler catheter in place with slightly concentrated appearing yellow urine, small clots noted in tubing & collection bag, no suprapubic tenderness or distension  Neurological: A&O x 3  Skin: warm and dry, no diaphoresis, pallor, cyanosis or jaundice      I&O's Detail    08 Mar 2022 07:01  -  09 Mar 2022 07:00  --------------------------------------------------------  IN:    IV PiggyBack: 200 mL    IV PiggyBack: 250 mL    IV PiggyBack: 100 mL    multiple electrolytes Injection Type 1 Bolus: 1000 mL    multiple electrolytes Injection Type 1.: 1650 mL    Norepinephrine: 123.8 mL  Total IN: 3323.8 mL    OUT:    Ureteral Catheter (mL): 1912 mL  Total OUT: 1912 mL    Total NET: 1411.8 mL      09 Mar 2022 07:01  -  09 Mar 2022 16:17  --------------------------------------------------------  IN:    IV PiggyBack: 300 mL    IV PiggyBack: 100 mL    multiple electrolytes Injection Type 1.: 600 mL  Total IN: 1000 mL    OUT:    Norepinephrine: 0 mL    Ureteral Catheter (mL): 565 mL  Total OUT: 565 mL    Total NET: 435 mL          LABS:                        10.8   14.61 )-----------( 235      ( 09 Mar 2022 03:46 )             32.7     -    137  |  99  |  31.7<H>  ----------------------------<  129<H>  3.4<L>   |  24.0  |  2.11<H>    Ca    7.9<L>      09 Mar 2022 03:46  Phos  2.5     03-09  Mg     1.8     03-09    TPro  7.0  /  Alb  3.2<L>  /  TBili  1.3  /  DBili  x   /  AST  10  /  ALT  18  /  AlkPhos  95  03-08    PT/INR - ( 08 Mar 2022 12:35 )   PT: 14.0 sec;   INR: 1.20 ratio         PTT - ( 08 Mar 2022 12:35 )  PTT:28.9 sec  Urinalysis Basic - ( 08 Mar 2022 15:52 )    Color: Radha / Appearance: Slightly Turbid / S.025 / pH: x  Gluc: x / Ketone: Trace  / Bili: Small / Urobili: 4   Blood: x / Protein: 30 mg/dL / Nitrite: Positive   Leuk Esterase: Moderate / RBC: 25-50 /HPF / WBC 11-25 /HPF   Sq Epi: x / Non Sq Epi: Few / Bacteria: Moderate

## 2022-03-10 LAB
ANION GAP SERPL CALC-SCNC: 12 MMOL/L — SIGNIFICANT CHANGE UP (ref 5–17)
BUN SERPL-MCNC: 29 MG/DL — HIGH (ref 8–20)
CALCIUM SERPL-MCNC: 7.5 MG/DL — LOW (ref 8.6–10.2)
CHLORIDE SERPL-SCNC: 102 MMOL/L — SIGNIFICANT CHANGE UP (ref 98–107)
CO2 SERPL-SCNC: 26 MMOL/L — SIGNIFICANT CHANGE UP (ref 22–29)
CREAT SERPL-MCNC: 1.36 MG/DL — HIGH (ref 0.5–1.3)
EGFR: 57 ML/MIN/1.73M2 — LOW
GLUCOSE SERPL-MCNC: 137 MG/DL — HIGH (ref 70–99)
HCT VFR BLD CALC: 30.4 % — LOW (ref 39–50)
HGB BLD-MCNC: 9.9 G/DL — LOW (ref 13–17)
MAGNESIUM SERPL-MCNC: 2.7 MG/DL — HIGH (ref 1.6–2.6)
MCHC RBC-ENTMCNC: 32.6 GM/DL — SIGNIFICANT CHANGE UP (ref 32–36)
MCHC RBC-ENTMCNC: 32.7 PG — SIGNIFICANT CHANGE UP (ref 27–34)
MCV RBC AUTO: 100.3 FL — HIGH (ref 80–100)
PHOSPHATE SERPL-MCNC: 2.4 MG/DL — SIGNIFICANT CHANGE UP (ref 2.4–4.7)
PLATELET # BLD AUTO: 227 K/UL — SIGNIFICANT CHANGE UP (ref 150–400)
POTASSIUM SERPL-MCNC: 3.6 MMOL/L — SIGNIFICANT CHANGE UP (ref 3.5–5.3)
POTASSIUM SERPL-SCNC: 3.6 MMOL/L — SIGNIFICANT CHANGE UP (ref 3.5–5.3)
RBC # BLD: 3.03 M/UL — LOW (ref 4.2–5.8)
RBC # FLD: 14.2 % — SIGNIFICANT CHANGE UP (ref 10.3–14.5)
SODIUM SERPL-SCNC: 140 MMOL/L — SIGNIFICANT CHANGE UP (ref 135–145)
VANCOMYCIN TROUGH SERPL-MCNC: 8.5 UG/ML — LOW (ref 10–20)
WBC # BLD: 12.57 K/UL — HIGH (ref 3.8–10.5)
WBC # FLD AUTO: 12.57 K/UL — HIGH (ref 3.8–10.5)

## 2022-03-10 PROCEDURE — 99233 SBSQ HOSP IP/OBS HIGH 50: CPT

## 2022-03-10 RX ORDER — VANCOMYCIN HCL 1 G
750 VIAL (EA) INTRAVENOUS EVERY 12 HOURS
Refills: 0 | Status: DISCONTINUED | OUTPATIENT
Start: 2022-03-10 | End: 2022-03-12

## 2022-03-10 RX ORDER — TAMSULOSIN HYDROCHLORIDE 0.4 MG/1
0.4 CAPSULE ORAL AT BEDTIME
Refills: 0 | Status: DISCONTINUED | OUTPATIENT
Start: 2022-03-10 | End: 2022-03-24

## 2022-03-10 RX ORDER — POTASSIUM CHLORIDE 20 MEQ
40 PACKET (EA) ORAL EVERY 4 HOURS
Refills: 0 | Status: COMPLETED | OUTPATIENT
Start: 2022-03-10 | End: 2022-03-10

## 2022-03-10 RX ORDER — SIMVASTATIN 20 MG/1
40 TABLET, FILM COATED ORAL AT BEDTIME
Refills: 0 | Status: DISCONTINUED | OUTPATIENT
Start: 2022-03-10 | End: 2022-03-24

## 2022-03-10 RX ORDER — MONTELUKAST 4 MG/1
10 TABLET, CHEWABLE ORAL DAILY
Refills: 0 | Status: DISCONTINUED | OUTPATIENT
Start: 2022-03-10 | End: 2022-03-24

## 2022-03-10 RX ORDER — OXYCODONE HYDROCHLORIDE 5 MG/1
10 TABLET ORAL EVERY 4 HOURS
Refills: 0 | Status: DISCONTINUED | OUTPATIENT
Start: 2022-03-10 | End: 2022-03-16

## 2022-03-10 RX ORDER — OXYCODONE HYDROCHLORIDE 5 MG/1
5 TABLET ORAL EVERY 4 HOURS
Refills: 0 | Status: DISCONTINUED | OUTPATIENT
Start: 2022-03-10 | End: 2022-03-16

## 2022-03-10 RX ORDER — ACETAMINOPHEN 500 MG
650 TABLET ORAL EVERY 6 HOURS
Refills: 0 | Status: DISCONTINUED | OUTPATIENT
Start: 2022-03-10 | End: 2022-03-24

## 2022-03-10 RX ORDER — CEFEPIME 1 G/1
2000 INJECTION, POWDER, FOR SOLUTION INTRAMUSCULAR; INTRAVENOUS EVERY 12 HOURS
Refills: 0 | Status: DISCONTINUED | OUTPATIENT
Start: 2022-03-10 | End: 2022-03-14

## 2022-03-10 RX ORDER — POLYETHYLENE GLYCOL 3350 17 G/17G
17 POWDER, FOR SOLUTION ORAL DAILY
Refills: 0 | Status: DISCONTINUED | OUTPATIENT
Start: 2022-03-10 | End: 2022-03-24

## 2022-03-10 RX ORDER — SENNA PLUS 8.6 MG/1
2 TABLET ORAL AT BEDTIME
Refills: 0 | Status: DISCONTINUED | OUTPATIENT
Start: 2022-03-10 | End: 2022-03-24

## 2022-03-10 RX ORDER — HYDROMORPHONE HYDROCHLORIDE 2 MG/ML
0.5 INJECTION INTRAMUSCULAR; INTRAVENOUS; SUBCUTANEOUS
Refills: 0 | Status: DISCONTINUED | OUTPATIENT
Start: 2022-03-10 | End: 2022-03-10

## 2022-03-10 RX ORDER — BACITRACIN ZINC 500 UNIT/G
1 OINTMENT IN PACKET (EA) TOPICAL EVERY 6 HOURS
Refills: 0 | Status: DISCONTINUED | OUTPATIENT
Start: 2022-03-10 | End: 2022-03-24

## 2022-03-10 RX ADMIN — SIMVASTATIN 40 MILLIGRAM(S): 20 TABLET, FILM COATED ORAL at 22:21

## 2022-03-10 RX ADMIN — SENNA PLUS 2 TABLET(S): 8.6 TABLET ORAL at 22:21

## 2022-03-10 RX ADMIN — Medication 250 MILLIGRAM(S): at 10:42

## 2022-03-10 RX ADMIN — OXYCODONE HYDROCHLORIDE 10 MILLIGRAM(S): 5 TABLET ORAL at 11:41

## 2022-03-10 RX ADMIN — HYDROMORPHONE HYDROCHLORIDE 0.5 MILLIGRAM(S): 2 INJECTION INTRAMUSCULAR; INTRAVENOUS; SUBCUTANEOUS at 02:09

## 2022-03-10 RX ADMIN — OXYCODONE HYDROCHLORIDE 10 MILLIGRAM(S): 5 TABLET ORAL at 10:41

## 2022-03-10 RX ADMIN — Medication 1 APPLICATION(S): at 12:03

## 2022-03-10 RX ADMIN — HEPARIN SODIUM 7500 UNIT(S): 5000 INJECTION INTRAVENOUS; SUBCUTANEOUS at 22:21

## 2022-03-10 RX ADMIN — OXYCODONE HYDROCHLORIDE 10 MILLIGRAM(S): 5 TABLET ORAL at 04:23

## 2022-03-10 RX ADMIN — Medication 40 MILLIEQUIVALENT(S): at 10:42

## 2022-03-10 RX ADMIN — Medication 100 MILLIGRAM(S): at 05:57

## 2022-03-10 RX ADMIN — Medication 3 MILLILITER(S): at 16:18

## 2022-03-10 RX ADMIN — OXYCODONE HYDROCHLORIDE 10 MILLIGRAM(S): 5 TABLET ORAL at 18:24

## 2022-03-10 RX ADMIN — Medication 3 MILLILITER(S): at 03:48

## 2022-03-10 RX ADMIN — Medication 3 MILLILITER(S): at 21:08

## 2022-03-10 RX ADMIN — Medication 650 MILLIGRAM(S): at 05:57

## 2022-03-10 RX ADMIN — TAMSULOSIN HYDROCHLORIDE 0.4 MILLIGRAM(S): 0.4 CAPSULE ORAL at 22:21

## 2022-03-10 RX ADMIN — Medication 40 MILLIEQUIVALENT(S): at 13:21

## 2022-03-10 RX ADMIN — HEPARIN SODIUM 7500 UNIT(S): 5000 INJECTION INTRAVENOUS; SUBCUTANEOUS at 13:21

## 2022-03-10 RX ADMIN — Medication 100 MILLIGRAM(S): at 22:22

## 2022-03-10 RX ADMIN — HEPARIN SODIUM 7500 UNIT(S): 5000 INJECTION INTRAVENOUS; SUBCUTANEOUS at 05:57

## 2022-03-10 RX ADMIN — Medication 100 MILLIGRAM(S): at 13:21

## 2022-03-10 RX ADMIN — OXYCODONE HYDROCHLORIDE 10 MILLIGRAM(S): 5 TABLET ORAL at 19:30

## 2022-03-10 RX ADMIN — Medication 650 MILLIGRAM(S): at 12:07

## 2022-03-10 RX ADMIN — CHLORHEXIDINE GLUCONATE 1 APPLICATION(S): 213 SOLUTION TOPICAL at 12:07

## 2022-03-10 RX ADMIN — CEFEPIME 100 MILLIGRAM(S): 1 INJECTION, POWDER, FOR SOLUTION INTRAMUSCULAR; INTRAVENOUS at 05:57

## 2022-03-10 RX ADMIN — Medication 650 MILLIGRAM(S): at 17:29

## 2022-03-10 RX ADMIN — Medication 3 MILLILITER(S): at 09:20

## 2022-03-10 RX ADMIN — Medication 40 MILLIEQUIVALENT(S): at 05:57

## 2022-03-10 RX ADMIN — CEFEPIME 100 MILLIGRAM(S): 1 INJECTION, POWDER, FOR SOLUTION INTRAMUSCULAR; INTRAVENOUS at 17:29

## 2022-03-10 RX ADMIN — Medication 1 APPLICATION(S): at 17:32

## 2022-03-10 NOTE — PROGRESS NOTE ADULT - SUBJECTIVE AND OBJECTIVE BOX
Subjective:66yMale POD#1 s/p I&D right scrotal abscess.  Pt feeling better, has some c/o pain at the tip of his penis from the wheeler catheter, no c/o scrotal pain. Pt remains off pressors, appears better.    Wheeler: yellow    Vital Signs Last 24 Hrs  T(C): 37.1 (10 Mar 2022 07:23), Max: 38.9 (09 Mar 2022 11:15)  T(F): 98.7 (10 Mar 2022 07:23), Max: 102 (09 Mar 2022 11:15)  HR: 98 (10 Mar 2022 09:00) (93 - 124)  BP: 147/75 (10 Mar 2022 08:00) (84/59 - 147/75)  BP(mean): 94 (10 Mar 2022 08:00) (67 - 94)  RR: 19 (10 Mar 2022 09:00) (14 - 29)  SpO2: 98% (10 Mar 2022 09:00) (92% - 99%)  I&O's Detail    09 Mar 2022 07:01  -  10 Mar 2022 07:00  --------------------------------------------------------  IN:    IV PiggyBack: 200 mL    IV PiggyBack: 200 mL    IV PiggyBack: 300 mL    multiple electrolytes Injection Type 1 Bolus: 1000 mL    multiple electrolytes Injection Type 1.: 600 mL  Total IN: 2300 mL    OUT:    Indwelling Catheter - Urethral (mL): 1275 mL    Norepinephrine: 0 mL    Ureteral Catheter (mL): 905 mL  Total OUT: 2180 mL    Total NET: 120 mL      10 Mar 2022 07:01  -  10 Mar 2022 09:14  --------------------------------------------------------  IN:  Total IN: 0 mL    OUT:    Indwelling Catheter - Urethral (mL): 315 mL  Total OUT: 315 mL    Total NET: -315 mL          Labs:                        9.9    12.57 )-----------( 227      ( 10 Mar 2022 02:39 )             30.4     03-10    140  |  102  |  29.0<H>  ----------------------------<  137<H>  3.6   |  26.0  |  1.36<H>    Ca    7.5<L>      10 Mar 2022 02:39  Phos  2.4     03-10  Mg     2.7     03-10    TPro  7.0  /  Alb  3.2<L>  /  TBili  1.3  /  DBili  x   /  AST  10  /  ALT  18  /  AlkPhos  95  03-08    PT/INR - ( 08 Mar 2022 12:35 )   PT: 14.0 sec;   INR: 1.20 ratio         PTT - ( 08 Mar 2022 12:35 )  PTT:28.9 sec      Culture - Urine (collected 08 Mar 2022 22:16)  Source: Clean Catch Clean Catch (Midstream)  Final Report (09 Mar 2022 18:06):    No growth

## 2022-03-10 NOTE — PROGRESS NOTE ADULT - ASSESSMENT
67 yo male POD#1 s/p I&D scrotal abscess, sepsis, SAVANNA  - creatinine continues to improves  - cont IV hydration  - cont abx  - f/u cx's  - wound care daily, bacitracin, xeroform, gauze to scrotum  - OOB to chair  - PT eval  - pt to be downgraded to floor later today

## 2022-03-10 NOTE — PROGRESS NOTE ADULT - SUBJECTIVE AND OBJECTIVE BOX
HPI/Overnight:  RTOR 3/9 for further washout and debridement of scrotum.  Mild hypotension post op, corrected with 1L IVF bolus.  Overnight pt did well.  Started on PO pain meds with good relief.  Hemodynamically normal.        ICU Vital Signs Last 24 Hrs  T(C): 37.6 (10 Mar 2022 04:13), Max: 38.9 (09 Mar 2022 11:15)  T(F): 99.7 (10 Mar 2022 04:13), Max: 102 (09 Mar 2022 11:15)  HR: 106 (10 Mar 2022 06:00) (93 - 124)  BP: 102/61 (10 Mar 2022 06:00) (84/59 - 137/84)  BP(mean): 74 (10 Mar 2022 06:00) (67 - 98)  ABP: --  ABP(mean): --  RR: 23 (10 Mar 2022 06:00) (14 - 29)  SpO2: 97% (10 Mar 2022 06:00) (92% - 99%)            MEDICATIONS  (STANDING):  acetaminophen     Tablet .. 650 milliGRAM(s) Oral every 6 hours  albuterol/ipratropium for Nebulization 3 milliLiter(s) Nebulizer every 6 hours  cefepime   IVPB 1000 milliGRAM(s) IV Intermittent every 12 hours  chlorhexidine 2% Cloths 1 Application(s) Topical daily  heparin   Injectable 7500 Unit(s) SubCutaneous every 8 hours  influenza  Vaccine (HIGH DOSE) 0.7 milliLiter(s) IntraMuscular once  metroNIDAZOLE  IVPB 500 milliGRAM(s) IV Intermittent every 8 hours  potassium chloride    Tablet ER 40 milliEquivalent(s) Oral every 4 hours  vancomycin  IVPB 750 milliGRAM(s) IV Intermittent <User Schedule>    MEDICATIONS  (PRN):  acetaminophen     Tablet .. 650 milliGRAM(s) Oral every 6 hours PRN Temp greater or equal to 38C (100.4F), Mild Pain (1 - 3)  HYDROmorphone  Injectable 0.5 milliGRAM(s) IV Push every 3 hours PRN breakthrough pain  oxyCODONE    IR 5 milliGRAM(s) Oral every 4 hours PRN Moderate Pain (4 - 6)  oxyCODONE    IR 10 milliGRAM(s) Oral every 4 hours PRN Severe Pain (7 - 10)            Physical Exam:    Gen: Non-toxic appearing man in NAD lying in bed.    HEENT: EOMI    Neurological: Alert and oriented x3 without focal deficit.    Neck: Trachea midline, no evidence of JVD, supple    Pulmonary: Inspiratory wheezing, clears with cough    Cardiovascular: NSR    Gastrointestinal: Soft, obese, non-tender, non-distended, and without guarding    : Peters in place draining yellow urine. Post op dressing in place, C/D/I    Extremities: Without c/c/e.    Skin: Intact.    Musculoskeletal: FROM without pain, no deformity or areas of tenderness.          LABS:                            9.9    12.57 )-----------( 227      ( 10 Mar 2022 02:39 )             30.4               03-10    140  |  102  |  29.0<H>  ----------------------------<  137<H>  3.6   |  26.0  |  1.36<H>    Ca    7.5<L>      10 Mar 2022 02:39  Phos  2.4     03-10  Mg     2.7     03-10    TPro  7.0  /  Alb  3.2<L>  /  TBili  1.3  /  DBili  x   /  AST  10  /  ALT  18  /  AlkPhos  95  03-08                    ASSESSMENT/PLAN:  66y Male presenting with septic 2/2 scrotal abscess, s/p orchectomy, s/p RTOR 3/9 further debridement and washout.     Neuro: A&Ox3. Continue multimodal analgesic regimen for pain control.     CV: Pt seemingly euvolemic, now off pressors. Continue non-invasive blood pressure monitoring.     Pulm: Continue albuterol treatment as needed. Pt unable to recall home meds for COPD, will attempt to establish a med rec later today    GI/Nutrition: Diet, IVL    /Renal: Peters for strict I&O, f/u am labs, plan for OR today    ID: Continue vanco/cefepime/flagyl (3/8)    Endo: No active issues     Skin: Repositioning for DTI prevention while in bed    Heme/DVT Prophylaxis: SCD's, SQH    Lines/Tubes: PIV.    Dispo: Pt remains off pressors, doing well post op. Likely downgrade today.     HPI/Overnight:  RTOR 3/9 for further washout and debridement of scrotum.  Mild hypotension post op, corrected with 1L IVF bolus.  Overnight pt did well.  Started on PO pain meds with good relief.  Hemodynamically normal.        ICU Vital Signs Last 24 Hrs  T(C): 37.6 (10 Mar 2022 04:13), Max: 38.9 (09 Mar 2022 11:15)  T(F): 99.7 (10 Mar 2022 04:13), Max: 102 (09 Mar 2022 11:15)  HR: 106 (10 Mar 2022 06:00) (93 - 124)  BP: 102/61 (10 Mar 2022 06:00) (84/59 - 137/84)  BP(mean): 74 (10 Mar 2022 06:00) (67 - 98)  ABP: --  ABP(mean): --  RR: 23 (10 Mar 2022 06:00) (14 - 29)  SpO2: 97% (10 Mar 2022 06:00) (92% - 99%)            MEDICATIONS  (STANDING):  acetaminophen     Tablet .. 650 milliGRAM(s) Oral every 6 hours  albuterol/ipratropium for Nebulization 3 milliLiter(s) Nebulizer every 6 hours  cefepime   IVPB 1000 milliGRAM(s) IV Intermittent every 12 hours  chlorhexidine 2% Cloths 1 Application(s) Topical daily  heparin   Injectable 7500 Unit(s) SubCutaneous every 8 hours  influenza  Vaccine (HIGH DOSE) 0.7 milliLiter(s) IntraMuscular once  metroNIDAZOLE  IVPB 500 milliGRAM(s) IV Intermittent every 8 hours  potassium chloride    Tablet ER 40 milliEquivalent(s) Oral every 4 hours  vancomycin  IVPB 750 milliGRAM(s) IV Intermittent <User Schedule>    MEDICATIONS  (PRN):  acetaminophen     Tablet .. 650 milliGRAM(s) Oral every 6 hours PRN Temp greater or equal to 38C (100.4F), Mild Pain (1 - 3)  HYDROmorphone  Injectable 0.5 milliGRAM(s) IV Push every 3 hours PRN breakthrough pain  oxyCODONE    IR 5 milliGRAM(s) Oral every 4 hours PRN Moderate Pain (4 - 6)  oxyCODONE    IR 10 milliGRAM(s) Oral every 4 hours PRN Severe Pain (7 - 10)        I&O's Detail    08 Mar 2022 07:01  -  09 Mar 2022 07:00  --------------------------------------------------------  IN:    IV PiggyBack: 200 mL    IV PiggyBack: 250 mL    IV PiggyBack: 100 mL    multiple electrolytes Injection Type 1 Bolus: 1000 mL    multiple electrolytes Injection Type 1.: 1650 mL    Norepinephrine: 123.8 mL  Total IN: 3323.8 mL    OUT:    Ureteral Catheter (mL): 1912 mL  Total OUT: 1912 mL    Total NET: 1411.8 mL      09 Mar 2022 07:01  -  10 Mar 2022 06:11  --------------------------------------------------------  IN:    IV PiggyBack: 200 mL    IV PiggyBack: 300 mL    IV PiggyBack: 200 mL    multiple electrolytes Injection Type 1 Bolus: 1000 mL    multiple electrolytes Injection Type 1.: 600 mL  Total IN: 2300 mL    OUT:    Indwelling Catheter - Urethral (mL): 1275 mL    Norepinephrine: 0 mL    Ureteral Catheter (mL): 905 mL  Total OUT: 2180 mL    Total NET: 120 mL              Physical Exam:    Gen: Non-toxic appearing man in NAD lying in bed.    HEENT: EOMI    Neurological: Alert and oriented x3 without focal deficit.    Neck: Trachea midline, no evidence of JVD, supple    Pulmonary: Inspiratory wheezing, clears with cough    Cardiovascular: NSR    Gastrointestinal: Soft, obese, non-tender, non-distended, and without guarding    : Peters in place draining yellow urine. Post op dressing in place, C/D/I    Extremities: Without c/c/e.    Skin: Intact.    Musculoskeletal: FROM without pain, no deformity or areas of tenderness.          LABS:                            9.9    12.57 )-----------( 227      ( 10 Mar 2022 02:39 )             30.4               03-10    140  |  102  |  29.0<H>  ----------------------------<  137<H>  3.6   |  26.0  |  1.36<H>    Ca    7.5<L>      10 Mar 2022 02:39  Phos  2.4     03-10  Mg     2.7     03-10    TPro  7.0  /  Alb  3.2<L>  /  TBili  1.3  /  DBili  x   /  AST  10  /  ALT  18  /  AlkPhos  95  03-08                    ASSESSMENT/PLAN:  66y Male presenting with septic 2/2 scrotal abscess, s/p orchectomy, s/p RTOR 3/9 further debridement and washout.     Neuro: A&Ox3. Continue multimodal analgesic regimen for pain control.     CV: Pt seemingly euvolemic, now off pressors. Continue non-invasive blood pressure monitoring.     Pulm: Continue albuterol treatment as needed. Pt unable to recall home meds for COPD, will attempt to establish a med rec later today    GI/Nutrition: Diet, IVL    /Renal: Peters for strict I&O, f/u am labs, plan for OR today    ID: Continue vanco/cefepime/flagyl (3/8)    Endo: No active issues     Skin: Repositioning for DTI prevention while in bed    Heme/DVT Prophylaxis: SCD's, SQH    Lines/Tubes: PIV.    Dispo: Pt remains off pressors, doing well post op. Likely downgrade today.

## 2022-03-11 LAB
-  AMIKACIN: SIGNIFICANT CHANGE UP
-  AMOXICILLIN/CLAVULANIC ACID: SIGNIFICANT CHANGE UP
-  AMPICILLIN/SULBACTAM: SIGNIFICANT CHANGE UP
-  AMPICILLIN/SULBACTAM: SIGNIFICANT CHANGE UP
-  AMPICILLIN: SIGNIFICANT CHANGE UP
-  AZTREONAM: SIGNIFICANT CHANGE UP
-  CEFAZOLIN: SIGNIFICANT CHANGE UP
-  CEFAZOLIN: SIGNIFICANT CHANGE UP
-  CEFEPIME: SIGNIFICANT CHANGE UP
-  CEFOXITIN: SIGNIFICANT CHANGE UP
-  CEFTRIAXONE: SIGNIFICANT CHANGE UP
-  CIPROFLOXACIN: SIGNIFICANT CHANGE UP
-  CLINDAMYCIN: SIGNIFICANT CHANGE UP
-  DAPTOMYCIN: SIGNIFICANT CHANGE UP
-  ERTAPENEM: SIGNIFICANT CHANGE UP
-  ERYTHROMYCIN: SIGNIFICANT CHANGE UP
-  GENTAMICIN: SIGNIFICANT CHANGE UP
-  GENTAMICIN: SIGNIFICANT CHANGE UP
-  LEVOFLOXACIN: SIGNIFICANT CHANGE UP
-  LINEZOLID: SIGNIFICANT CHANGE UP
-  MEROPENEM: SIGNIFICANT CHANGE UP
-  OXACILLIN: SIGNIFICANT CHANGE UP
-  PENICILLIN: SIGNIFICANT CHANGE UP
-  PIPERACILLIN/TAZOBACTAM: SIGNIFICANT CHANGE UP
-  RIFAMPIN: SIGNIFICANT CHANGE UP
-  TETRACYCLINE: SIGNIFICANT CHANGE UP
-  TOBRAMYCIN: SIGNIFICANT CHANGE UP
-  TRIMETHOPRIM/SULFAMETHOXAZOLE: SIGNIFICANT CHANGE UP
-  TRIMETHOPRIM/SULFAMETHOXAZOLE: SIGNIFICANT CHANGE UP
-  VANCOMYCIN: SIGNIFICANT CHANGE UP
ANION GAP SERPL CALC-SCNC: 9 MMOL/L — SIGNIFICANT CHANGE UP (ref 5–17)
BASOPHILS # BLD AUTO: 0.04 K/UL — SIGNIFICANT CHANGE UP (ref 0–0.2)
BASOPHILS NFR BLD AUTO: 0.5 % — SIGNIFICANT CHANGE UP (ref 0–2)
BUN SERPL-MCNC: 24.6 MG/DL — HIGH (ref 8–20)
CALCIUM SERPL-MCNC: 8.1 MG/DL — LOW (ref 8.6–10.2)
CHLORIDE SERPL-SCNC: 103 MMOL/L — SIGNIFICANT CHANGE UP (ref 98–107)
CO2 SERPL-SCNC: 27 MMOL/L — SIGNIFICANT CHANGE UP (ref 22–29)
CREAT SERPL-MCNC: 1.08 MG/DL — SIGNIFICANT CHANGE UP (ref 0.5–1.3)
CULTURE RESULTS: SIGNIFICANT CHANGE UP
EGFR: 75 ML/MIN/1.73M2 — SIGNIFICANT CHANGE UP
EOSINOPHIL # BLD AUTO: 0.18 K/UL — SIGNIFICANT CHANGE UP (ref 0–0.5)
EOSINOPHIL NFR BLD AUTO: 2.1 % — SIGNIFICANT CHANGE UP (ref 0–6)
GLUCOSE SERPL-MCNC: 133 MG/DL — HIGH (ref 70–99)
HCT VFR BLD CALC: 31.7 % — LOW (ref 39–50)
HGB BLD-MCNC: 10.2 G/DL — LOW (ref 13–17)
IMM GRANULOCYTES NFR BLD AUTO: 4.2 % — HIGH (ref 0–1.5)
LYMPHOCYTES # BLD AUTO: 1.45 K/UL — SIGNIFICANT CHANGE UP (ref 1–3.3)
LYMPHOCYTES # BLD AUTO: 16.8 % — SIGNIFICANT CHANGE UP (ref 13–44)
MAGNESIUM SERPL-MCNC: 2.3 MG/DL — SIGNIFICANT CHANGE UP (ref 1.6–2.6)
MCHC RBC-ENTMCNC: 32.2 GM/DL — SIGNIFICANT CHANGE UP (ref 32–36)
MCHC RBC-ENTMCNC: 32.8 PG — SIGNIFICANT CHANGE UP (ref 27–34)
MCV RBC AUTO: 101.9 FL — HIGH (ref 80–100)
METHOD TYPE: SIGNIFICANT CHANGE UP
METHOD TYPE: SIGNIFICANT CHANGE UP
MONOCYTES # BLD AUTO: 0.65 K/UL — SIGNIFICANT CHANGE UP (ref 0–0.9)
MONOCYTES NFR BLD AUTO: 7.5 % — SIGNIFICANT CHANGE UP (ref 2–14)
NEUTROPHILS # BLD AUTO: 5.97 K/UL — SIGNIFICANT CHANGE UP (ref 1.8–7.4)
NEUTROPHILS NFR BLD AUTO: 68.9 % — SIGNIFICANT CHANGE UP (ref 43–77)
ORGANISM # SPEC MICROSCOPIC CNT: SIGNIFICANT CHANGE UP
PHOSPHATE SERPL-MCNC: 2.3 MG/DL — LOW (ref 2.4–4.7)
PLATELET # BLD AUTO: 243 K/UL — SIGNIFICANT CHANGE UP (ref 150–400)
POTASSIUM SERPL-MCNC: 3.9 MMOL/L — SIGNIFICANT CHANGE UP (ref 3.5–5.3)
POTASSIUM SERPL-SCNC: 3.9 MMOL/L — SIGNIFICANT CHANGE UP (ref 3.5–5.3)
RBC # BLD: 3.11 M/UL — LOW (ref 4.2–5.8)
RBC # FLD: 14.6 % — HIGH (ref 10.3–14.5)
SODIUM SERPL-SCNC: 139 MMOL/L — SIGNIFICANT CHANGE UP (ref 135–145)
SPECIMEN SOURCE: SIGNIFICANT CHANGE UP
WBC # BLD: 8.65 K/UL — SIGNIFICANT CHANGE UP (ref 3.8–10.5)
WBC # FLD AUTO: 8.65 K/UL — SIGNIFICANT CHANGE UP (ref 3.8–10.5)

## 2022-03-11 RX ORDER — FUROSEMIDE 40 MG
20 TABLET ORAL ONCE
Refills: 0 | Status: COMPLETED | OUTPATIENT
Start: 2022-03-11 | End: 2022-03-11

## 2022-03-11 RX ADMIN — SENNA PLUS 2 TABLET(S): 8.6 TABLET ORAL at 22:41

## 2022-03-11 RX ADMIN — OXYCODONE HYDROCHLORIDE 10 MILLIGRAM(S): 5 TABLET ORAL at 16:01

## 2022-03-11 RX ADMIN — TAMSULOSIN HYDROCHLORIDE 0.4 MILLIGRAM(S): 0.4 CAPSULE ORAL at 22:41

## 2022-03-11 RX ADMIN — HEPARIN SODIUM 7500 UNIT(S): 5000 INJECTION INTRAVENOUS; SUBCUTANEOUS at 22:41

## 2022-03-11 RX ADMIN — Medication 1 APPLICATION(S): at 06:25

## 2022-03-11 RX ADMIN — Medication 1 APPLICATION(S): at 13:26

## 2022-03-11 RX ADMIN — SIMVASTATIN 40 MILLIGRAM(S): 20 TABLET, FILM COATED ORAL at 22:41

## 2022-03-11 RX ADMIN — Medication 3 MILLILITER(S): at 04:12

## 2022-03-11 RX ADMIN — Medication 650 MILLIGRAM(S): at 17:40

## 2022-03-11 RX ADMIN — Medication 650 MILLIGRAM(S): at 06:23

## 2022-03-11 RX ADMIN — Medication 650 MILLIGRAM(S): at 00:46

## 2022-03-11 RX ADMIN — Medication 650 MILLIGRAM(S): at 14:01

## 2022-03-11 RX ADMIN — Medication 650 MILLIGRAM(S): at 23:25

## 2022-03-11 RX ADMIN — Medication 650 MILLIGRAM(S): at 07:00

## 2022-03-11 RX ADMIN — Medication 100 MILLIGRAM(S): at 06:22

## 2022-03-11 RX ADMIN — Medication 3 MILLILITER(S): at 21:08

## 2022-03-11 RX ADMIN — OXYCODONE HYDROCHLORIDE 10 MILLIGRAM(S): 5 TABLET ORAL at 10:00

## 2022-03-11 RX ADMIN — OXYCODONE HYDROCHLORIDE 10 MILLIGRAM(S): 5 TABLET ORAL at 10:14

## 2022-03-11 RX ADMIN — Medication 650 MILLIGRAM(S): at 01:00

## 2022-03-11 RX ADMIN — MONTELUKAST 10 MILLIGRAM(S): 4 TABLET, CHEWABLE ORAL at 13:26

## 2022-03-11 RX ADMIN — POLYETHYLENE GLYCOL 3350 17 GRAM(S): 17 POWDER, FOR SOLUTION ORAL at 13:26

## 2022-03-11 RX ADMIN — CEFEPIME 100 MILLIGRAM(S): 1 INJECTION, POWDER, FOR SOLUTION INTRAMUSCULAR; INTRAVENOUS at 17:44

## 2022-03-11 RX ADMIN — Medication 100 MILLIGRAM(S): at 13:25

## 2022-03-11 RX ADMIN — Medication 650 MILLIGRAM(S): at 13:01

## 2022-03-11 RX ADMIN — Medication 100 MILLIGRAM(S): at 22:42

## 2022-03-11 RX ADMIN — CEFEPIME 100 MILLIGRAM(S): 1 INJECTION, POWDER, FOR SOLUTION INTRAMUSCULAR; INTRAVENOUS at 06:21

## 2022-03-11 RX ADMIN — Medication 250 MILLIGRAM(S): at 00:47

## 2022-03-11 RX ADMIN — HEPARIN SODIUM 7500 UNIT(S): 5000 INJECTION INTRAVENOUS; SUBCUTANEOUS at 13:27

## 2022-03-11 RX ADMIN — Medication 250 MILLIGRAM(S): at 22:44

## 2022-03-11 RX ADMIN — Medication 20 MILLIGRAM(S): at 10:15

## 2022-03-11 RX ADMIN — OXYCODONE HYDROCHLORIDE 10 MILLIGRAM(S): 5 TABLET ORAL at 16:00

## 2022-03-11 RX ADMIN — Medication 650 MILLIGRAM(S): at 13:26

## 2022-03-11 RX ADMIN — Medication 3 MILLILITER(S): at 10:14

## 2022-03-11 RX ADMIN — CHLORHEXIDINE GLUCONATE 1 APPLICATION(S): 213 SOLUTION TOPICAL at 13:43

## 2022-03-11 RX ADMIN — Medication 250 MILLIGRAM(S): at 13:25

## 2022-03-11 RX ADMIN — Medication 1 APPLICATION(S): at 23:26

## 2022-03-11 RX ADMIN — HEPARIN SODIUM 7500 UNIT(S): 5000 INJECTION INTRAVENOUS; SUBCUTANEOUS at 06:23

## 2022-03-11 RX ADMIN — Medication 1 APPLICATION(S): at 00:46

## 2022-03-11 RX ADMIN — Medication 650 MILLIGRAM(S): at 13:35

## 2022-03-11 NOTE — PROGRESS NOTE ADULT - SUBJECTIVE AND OBJECTIVE BOX
Subjective:66yMale s/p recent right orchiectomy, presented with right scrotal abscess, sepsis, SAVANNA,  pOD#2 s/p I&D right scrotal abscess.  Pt downgraded from SICU yesterday.  pt feeling well, but weak, minimal c/o pain.  Pt was OOB to chair yesterday.     Peters: yellow/shree    Vital Signs Last 24 Hrs  T(C): 37.3 (11 Mar 2022 07:00), Max: 37.4 (11 Mar 2022 04:10)  T(F): 99.2 (11 Mar 2022 07:00), Max: 99.4 (11 Mar 2022 04:10)  HR: 100 (11 Mar 2022 07:00) (78 - 105)  BP: 130/81 (11 Mar 2022 07:00) (98/78 - 130/81)  BP(mean): 79 (10 Mar 2022 18:00) (71 - 85)  RR: 19 (11 Mar 2022 07:00) (16 - 26)  SpO2: 94% (11 Mar 2022 07:00) (89% - 98%)  I&O's Detail    10 Mar 2022 07:01  -  11 Mar 2022 07:00  --------------------------------------------------------  IN:    IV PiggyBack: 250 mL    IV PiggyBack: 100 mL    IV PiggyBack: 50 mL  Total IN: 400 mL    OUT:    Indwelling Catheter - Urethral (mL): 1095 mL  Total OUT: 1095 mL    Total NET: -695 mL          Labs:                        10.2   8.65  )-----------( 243      ( 11 Mar 2022 07:09 )             31.7     03-11    139  |  103  |  24.6<H>  ----------------------------<  133<H>  3.9   |  27.0  |  1.08    Ca    8.1<L>      11 Mar 2022 07:10  Phos  2.3     03-11  Mg     2.3     03-11            Culture - Fungal, Other (collected 09 Mar 2022 21:49)  Source: .Other scrotal abscess  Preliminary Report (10 Mar 2022 11:09):    Testing in progress    Culture - Abscess with Gram Stain (collected 09 Mar 2022 21:48)  Source: .Abscess scrotal abscess  Preliminary Report (10 Mar 2022 22:42):    Moderate Staphylococcus aureus    Rare Serratia marcescens    Culture - Urine (collected 08 Mar 2022 22:16)  Source: Clean Catch Clean Catch (Midstream)  Final Report (09 Mar 2022 18:06):    No growth    Culture - Blood (collected 08 Mar 2022 12:28)  Source: .Blood Blood-Peripheral  Preliminary Report (10 Mar 2022 13:00):    No growth at 48 hours    Culture - Blood (collected 08 Mar 2022 12:27)  Source: .Blood Blood-Peripheral  Preliminary Report (10 Mar 2022 13:00):    No growth at 48 hours

## 2022-03-11 NOTE — PROGRESS NOTE ADULT - ASSESSMENT
67 yo male POD#2 s/p I&D scrotal abscess, SAVANNA, sepsis  - wound cx staph and few rare serratia  - cont abx  - SAVANNA resolved, creatinine normalized  - underwear provided to pt to wear when getting OOB  - PT consult  - f/u cx sensitivities  - cont wheeler

## 2022-03-12 LAB
ANION GAP SERPL CALC-SCNC: 12 MMOL/L — SIGNIFICANT CHANGE UP (ref 5–17)
BASOPHILS # BLD AUTO: 0 K/UL — SIGNIFICANT CHANGE UP (ref 0–0.2)
BASOPHILS NFR BLD AUTO: 0 % — SIGNIFICANT CHANGE UP (ref 0–2)
BUN SERPL-MCNC: 25.4 MG/DL — HIGH (ref 8–20)
CALCIUM SERPL-MCNC: 8.4 MG/DL — LOW (ref 8.6–10.2)
CHLORIDE SERPL-SCNC: 102 MMOL/L — SIGNIFICANT CHANGE UP (ref 98–107)
CO2 SERPL-SCNC: 26 MMOL/L — SIGNIFICANT CHANGE UP (ref 22–29)
CREAT SERPL-MCNC: 1.04 MG/DL — SIGNIFICANT CHANGE UP (ref 0.5–1.3)
EGFR: 78 ML/MIN/1.73M2 — SIGNIFICANT CHANGE UP
EOSINOPHIL # BLD AUTO: 0 K/UL — SIGNIFICANT CHANGE UP (ref 0–0.5)
EOSINOPHIL NFR BLD AUTO: 0 % — SIGNIFICANT CHANGE UP (ref 0–6)
GIANT PLATELETS BLD QL SMEAR: PRESENT — SIGNIFICANT CHANGE UP
GLUCOSE BLDC GLUCOMTR-MCNC: 112 MG/DL — HIGH (ref 70–99)
GLUCOSE SERPL-MCNC: 107 MG/DL — HIGH (ref 70–99)
HCT VFR BLD CALC: 30.6 % — LOW (ref 39–50)
HGB BLD-MCNC: 10 G/DL — LOW (ref 13–17)
LYMPHOCYTES # BLD AUTO: 2.56 K/UL — SIGNIFICANT CHANGE UP (ref 1–3.3)
LYMPHOCYTES # BLD AUTO: 23.5 % — SIGNIFICANT CHANGE UP (ref 13–44)
MAGNESIUM SERPL-MCNC: 1.9 MG/DL — SIGNIFICANT CHANGE UP (ref 1.6–2.6)
MANUAL SMEAR VERIFICATION: SIGNIFICANT CHANGE UP
MCHC RBC-ENTMCNC: 32.6 PG — SIGNIFICANT CHANGE UP (ref 27–34)
MCHC RBC-ENTMCNC: 32.7 GM/DL — SIGNIFICANT CHANGE UP (ref 32–36)
MCV RBC AUTO: 99.7 FL — SIGNIFICANT CHANGE UP (ref 80–100)
METAMYELOCYTES # FLD: 1.7 % — HIGH (ref 0–0)
MONOCYTES # BLD AUTO: 0.76 K/UL — SIGNIFICANT CHANGE UP (ref 0–0.9)
MONOCYTES NFR BLD AUTO: 7 % — SIGNIFICANT CHANGE UP (ref 2–14)
NEUTROPHILS # BLD AUTO: 7.2 K/UL — SIGNIFICANT CHANGE UP (ref 1.8–7.4)
NEUTROPHILS NFR BLD AUTO: 66.1 % — SIGNIFICANT CHANGE UP (ref 43–77)
NRBC # BLD: 1 /100 — HIGH (ref 0–0)
PHOSPHATE SERPL-MCNC: 2.9 MG/DL — SIGNIFICANT CHANGE UP (ref 2.4–4.7)
PLAT MORPH BLD: NORMAL — SIGNIFICANT CHANGE UP
PLATELET # BLD AUTO: 288 K/UL — SIGNIFICANT CHANGE UP (ref 150–400)
POTASSIUM SERPL-MCNC: 3.7 MMOL/L — SIGNIFICANT CHANGE UP (ref 3.5–5.3)
POTASSIUM SERPL-SCNC: 3.7 MMOL/L — SIGNIFICANT CHANGE UP (ref 3.5–5.3)
RBC # BLD: 3.07 M/UL — LOW (ref 4.2–5.8)
RBC # FLD: 14.9 % — HIGH (ref 10.3–14.5)
RBC BLD AUTO: NORMAL — SIGNIFICANT CHANGE UP
SODIUM SERPL-SCNC: 140 MMOL/L — SIGNIFICANT CHANGE UP (ref 135–145)
VANCOMYCIN TROUGH SERPL-MCNC: 10.3 UG/ML — SIGNIFICANT CHANGE UP (ref 10–20)
VARIANT LYMPHS # BLD: 1.7 % — SIGNIFICANT CHANGE UP (ref 0–6)
WBC # BLD: 10.89 K/UL — HIGH (ref 3.8–10.5)
WBC # FLD AUTO: 10.89 K/UL — HIGH (ref 3.8–10.5)

## 2022-03-12 PROCEDURE — 99024 POSTOP FOLLOW-UP VISIT: CPT

## 2022-03-12 RX ORDER — KETOROLAC TROMETHAMINE 30 MG/ML
15 SYRINGE (ML) INJECTION ONCE
Refills: 0 | Status: DISCONTINUED | OUTPATIENT
Start: 2022-03-12 | End: 2022-03-12

## 2022-03-12 RX ORDER — VANCOMYCIN HCL 1 G
1000 VIAL (EA) INTRAVENOUS EVERY 12 HOURS
Refills: 0 | Status: DISCONTINUED | OUTPATIENT
Start: 2022-03-13 | End: 2022-03-16

## 2022-03-12 RX ORDER — POTASSIUM CHLORIDE 20 MEQ
40 PACKET (EA) ORAL ONCE
Refills: 0 | Status: COMPLETED | OUTPATIENT
Start: 2022-03-12 | End: 2022-03-15

## 2022-03-12 RX ORDER — MORPHINE SULFATE 50 MG/1
2 CAPSULE, EXTENDED RELEASE ORAL ONCE
Refills: 0 | Status: DISCONTINUED | OUTPATIENT
Start: 2022-03-12 | End: 2022-03-12

## 2022-03-12 RX ADMIN — OXYCODONE HYDROCHLORIDE 10 MILLIGRAM(S): 5 TABLET ORAL at 17:44

## 2022-03-12 RX ADMIN — Medication 650 MILLIGRAM(S): at 12:30

## 2022-03-12 RX ADMIN — OXYCODONE HYDROCHLORIDE 10 MILLIGRAM(S): 5 TABLET ORAL at 22:32

## 2022-03-12 RX ADMIN — CEFEPIME 100 MILLIGRAM(S): 1 INJECTION, POWDER, FOR SOLUTION INTRAMUSCULAR; INTRAVENOUS at 17:41

## 2022-03-12 RX ADMIN — MONTELUKAST 10 MILLIGRAM(S): 4 TABLET, CHEWABLE ORAL at 14:09

## 2022-03-12 RX ADMIN — OXYCODONE HYDROCHLORIDE 10 MILLIGRAM(S): 5 TABLET ORAL at 21:37

## 2022-03-12 RX ADMIN — Medication 3 MILLILITER(S): at 05:34

## 2022-03-12 RX ADMIN — Medication 3 MILLILITER(S): at 09:25

## 2022-03-12 RX ADMIN — Medication 3 MILLILITER(S): at 16:06

## 2022-03-12 RX ADMIN — Medication 650 MILLIGRAM(S): at 00:00

## 2022-03-12 RX ADMIN — SENNA PLUS 2 TABLET(S): 8.6 TABLET ORAL at 21:37

## 2022-03-12 RX ADMIN — OXYCODONE HYDROCHLORIDE 10 MILLIGRAM(S): 5 TABLET ORAL at 02:27

## 2022-03-12 RX ADMIN — CEFEPIME 100 MILLIGRAM(S): 1 INJECTION, POWDER, FOR SOLUTION INTRAMUSCULAR; INTRAVENOUS at 05:14

## 2022-03-12 RX ADMIN — OXYCODONE HYDROCHLORIDE 10 MILLIGRAM(S): 5 TABLET ORAL at 01:14

## 2022-03-12 RX ADMIN — OXYCODONE HYDROCHLORIDE 10 MILLIGRAM(S): 5 TABLET ORAL at 09:34

## 2022-03-12 RX ADMIN — Medication 650 MILLIGRAM(S): at 06:00

## 2022-03-12 RX ADMIN — OXYCODONE HYDROCHLORIDE 10 MILLIGRAM(S): 5 TABLET ORAL at 17:40

## 2022-03-12 RX ADMIN — HEPARIN SODIUM 7500 UNIT(S): 5000 INJECTION INTRAVENOUS; SUBCUTANEOUS at 05:14

## 2022-03-12 RX ADMIN — Medication 650 MILLIGRAM(S): at 17:40

## 2022-03-12 RX ADMIN — MORPHINE SULFATE 2 MILLIGRAM(S): 50 CAPSULE, EXTENDED RELEASE ORAL at 11:26

## 2022-03-12 RX ADMIN — Medication 650 MILLIGRAM(S): at 17:43

## 2022-03-12 RX ADMIN — Medication 1 APPLICATION(S): at 12:26

## 2022-03-12 RX ADMIN — MORPHINE SULFATE 2 MILLIGRAM(S): 50 CAPSULE, EXTENDED RELEASE ORAL at 12:26

## 2022-03-12 RX ADMIN — Medication 1 APPLICATION(S): at 17:43

## 2022-03-12 RX ADMIN — CHLORHEXIDINE GLUCONATE 1 APPLICATION(S): 213 SOLUTION TOPICAL at 12:27

## 2022-03-12 RX ADMIN — Medication 15 MILLIGRAM(S): at 02:09

## 2022-03-12 RX ADMIN — Medication 15 MILLIGRAM(S): at 03:00

## 2022-03-12 RX ADMIN — Medication 250 MILLIGRAM(S): at 14:48

## 2022-03-12 RX ADMIN — OXYCODONE HYDROCHLORIDE 10 MILLIGRAM(S): 5 TABLET ORAL at 14:48

## 2022-03-12 RX ADMIN — HEPARIN SODIUM 7500 UNIT(S): 5000 INJECTION INTRAVENOUS; SUBCUTANEOUS at 14:09

## 2022-03-12 RX ADMIN — POLYETHYLENE GLYCOL 3350 17 GRAM(S): 17 POWDER, FOR SOLUTION ORAL at 14:10

## 2022-03-12 RX ADMIN — OXYCODONE HYDROCHLORIDE 10 MILLIGRAM(S): 5 TABLET ORAL at 12:30

## 2022-03-12 RX ADMIN — Medication 100 MILLIGRAM(S): at 21:39

## 2022-03-12 RX ADMIN — Medication 650 MILLIGRAM(S): at 05:14

## 2022-03-12 RX ADMIN — OXYCODONE HYDROCHLORIDE 10 MILLIGRAM(S): 5 TABLET ORAL at 14:08

## 2022-03-12 RX ADMIN — SIMVASTATIN 40 MILLIGRAM(S): 20 TABLET, FILM COATED ORAL at 21:38

## 2022-03-12 RX ADMIN — Medication 100 MILLIGRAM(S): at 05:15

## 2022-03-12 RX ADMIN — Medication 1 APPLICATION(S): at 05:21

## 2022-03-12 RX ADMIN — TAMSULOSIN HYDROCHLORIDE 0.4 MILLIGRAM(S): 0.4 CAPSULE ORAL at 21:37

## 2022-03-12 RX ADMIN — HEPARIN SODIUM 7500 UNIT(S): 5000 INJECTION INTRAVENOUS; SUBCUTANEOUS at 21:38

## 2022-03-12 RX ADMIN — Medication 100 MILLIGRAM(S): at 14:10

## 2022-03-12 RX ADMIN — Medication 650 MILLIGRAM(S): at 09:34

## 2022-03-12 NOTE — PHYSICAL THERAPY INITIAL EVALUATION ADULT - ADDITIONAL COMMENTS
Patient lives in basement of home with 4 ABEL + rails. Patient has many family members in home that can assist PRN. Patient uses [employer] occasionally at baseline, drives PRN. Has RW, tub bench, commode and grab bars in bathroom

## 2022-03-12 NOTE — PROGRESS NOTE ADULT - ASSESSMENT
66M s/p right orchiectomy on 3/1, admitted in septic shock due to scrotal abscess. Now s/p I&D of scrotal abscess 3/9    - cultures reviewed, no need for Flagyl at this time, will d/c.  Continue Vanco and Cefepime  - fever overnight and WBC slightly elevated, will get ultrasound of scrotum to check for re-accumulation of abscess  - continue local wound care, bacitracin 4x/day and cover with Telfa pad  - scrotal elevation  - pain control  - encourage OOB

## 2022-03-12 NOTE — PROGRESS NOTE ADULT - SUBJECTIVE AND OBJECTIVE BOX
INTERVAL HPI/OVERNIGHT EVENTS: Pt. c/o of burning sensation to scrotum, spiked fever to 101 and increased WBC this am.    STATUS POST:  Incision and drainage, abscess, scrotum 09-Mar-2022      MEDICATIONS  (STANDING):  acetaminophen     Tablet .. 650 milliGRAM(s) Oral every 6 hours  albuterol/ipratropium for Nebulization 3 milliLiter(s) Nebulizer every 6 hours  BACItracin   Ointment 1 Application(s) Topical every 6 hours  cefepime   IVPB 2000 milliGRAM(s) IV Intermittent every 12 hours  chlorhexidine 2% Cloths 1 Application(s) Topical daily  heparin   Injectable 7500 Unit(s) SubCutaneous every 8 hours  influenza  Vaccine (HIGH DOSE) 0.7 milliLiter(s) IntraMuscular once  metroNIDAZOLE  IVPB 500 milliGRAM(s) IV Intermittent every 8 hours  montelukast 10 milliGRAM(s) Oral daily  polyethylene glycol 3350 17 Gram(s) Oral daily  potassium chloride    Tablet ER 40 milliEquivalent(s) Oral once  senna 2 Tablet(s) Oral at bedtime  simvastatin 40 milliGRAM(s) Oral at bedtime  tamsulosin 0.4 milliGRAM(s) Oral at bedtime  vancomycin  IVPB 750 milliGRAM(s) IV Intermittent every 12 hours    MEDICATIONS  (PRN):  acetaminophen     Tablet .. 650 milliGRAM(s) Oral every 6 hours PRN Temp greater or equal to 38C (100.4F), Mild Pain (1 - 3)  oxyCODONE    IR 5 milliGRAM(s) Oral every 4 hours PRN Moderate Pain (4 - 6)  oxyCODONE    IR 10 milliGRAM(s) Oral every 4 hours PRN Severe Pain (7 - 10)      Vital Signs Last 24 Hrs  T(C): 37.3 (12 Mar 2022 07:25), Max: 38.3 (12 Mar 2022 01:50)  T(F): 99.1 (12 Mar 2022 07:25), Max: 101 (12 Mar 2022 01:50)  HR: 106 (12 Mar 2022 10:04) (94 - 108)  BP: 163/103 (12 Mar 2022 10:04) (118/77 - 171/95)  BP(mean): --  RR: 19 (12 Mar 2022 07:25) (19 - 20)  SpO2: 95% (12 Mar 2022 07:25) (94% - 97%)    PHYSICAL EXAM:      Constitutional: NAD    Respiratory: no accessory muscle use or conversational dyspnea    Cardiovascular: RRR    Gastrointestinal: soft, NT/ND    Genitourinary: wheeler in place draining dark urine, swelling to penis and scrotum, penrose in place, wound without signs of infection          I&O's Detail    11 Mar 2022 07:01  -  12 Mar 2022 07:00  --------------------------------------------------------  IN:  Total IN: 0 mL    OUT:    Indwelling Catheter - Urethral (mL): 3400 mL  Total OUT: 3400 mL    Total NET: -3400 mL          LABS:                        10.0   10.89 )-----------( 288      ( 12 Mar 2022 07:54 )             30.6     03-12    140  |  102  |  25.4<H>  ----------------------------<  107<H>  3.7   |  26.0  |  1.04    Ca    8.4<L>      12 Mar 2022 07:54  Phos  2.9     03-12  Mg     1.9     03-12            RADIOLOGY & ADDITIONAL STUDIES:

## 2022-03-12 NOTE — PHYSICAL THERAPY INITIAL EVALUATION ADULT - DIAGNOSIS, PT EVAL
Impaired gait and balance [place] Counseling:  I discussed with the patient the risks of imiquimod including but not limited to erythema, scaling, itching, weeping, crusting, and pain.  Patient understands that the inflammatory response to imiquimod is variable from person to person and was educated regarded proper titration schedule.  If flu-like symptoms develop, patient knows to discontinue the medication and contact us.

## 2022-03-12 NOTE — PHYSICAL THERAPY INITIAL EVALUATION ADULT - PERTINENT HX OF CURRENT PROBLEM, REHAB EVAL
Patient with recent PMHx of right testicle removal presented to [facility] with fever, chills, swelling and pain to scrotum, found to have abscess in scrotum

## 2022-03-12 NOTE — PHYSICAL THERAPY INITIAL EVALUATION ADULT - GAIT DISTANCE, PT EVAL
Patient c/o dizziness with ambulation, but reported being able to return back to room. Patient retuned to sit in bedside chair, where patient reporting dizziness resolved. BP taken in sittin/97, PIPER Alanis made aware./50 feet

## 2022-03-13 LAB
ANION GAP SERPL CALC-SCNC: 12 MMOL/L — SIGNIFICANT CHANGE UP (ref 5–17)
BASOPHILS # BLD AUTO: 0.09 K/UL — SIGNIFICANT CHANGE UP (ref 0–0.2)
BASOPHILS NFR BLD AUTO: 0.6 % — SIGNIFICANT CHANGE UP (ref 0–2)
BUN SERPL-MCNC: 20.5 MG/DL — HIGH (ref 8–20)
CALCIUM SERPL-MCNC: 8.9 MG/DL — SIGNIFICANT CHANGE UP (ref 8.6–10.2)
CHLORIDE SERPL-SCNC: 102 MMOL/L — SIGNIFICANT CHANGE UP (ref 98–107)
CO2 SERPL-SCNC: 26 MMOL/L — SIGNIFICANT CHANGE UP (ref 22–29)
CREAT SERPL-MCNC: 0.91 MG/DL — SIGNIFICANT CHANGE UP (ref 0.5–1.3)
CULTURE RESULTS: SIGNIFICANT CHANGE UP
CULTURE RESULTS: SIGNIFICANT CHANGE UP
EGFR: 92 ML/MIN/1.73M2 — SIGNIFICANT CHANGE UP
EOSINOPHIL # BLD AUTO: 0.25 K/UL — SIGNIFICANT CHANGE UP (ref 0–0.5)
EOSINOPHIL NFR BLD AUTO: 1.7 % — SIGNIFICANT CHANGE UP (ref 0–6)
GLUCOSE SERPL-MCNC: 134 MG/DL — HIGH (ref 70–99)
HCT VFR BLD CALC: 31.2 % — LOW (ref 39–50)
HGB BLD-MCNC: 10.2 G/DL — LOW (ref 13–17)
IMM GRANULOCYTES NFR BLD AUTO: 7.9 % — HIGH (ref 0–1.5)
LYMPHOCYTES # BLD AUTO: 18.4 % — SIGNIFICANT CHANGE UP (ref 13–44)
LYMPHOCYTES # BLD AUTO: 2.78 K/UL — SIGNIFICANT CHANGE UP (ref 1–3.3)
MAGNESIUM SERPL-MCNC: 1.8 MG/DL — SIGNIFICANT CHANGE UP (ref 1.6–2.6)
MCHC RBC-ENTMCNC: 32.5 PG — SIGNIFICANT CHANGE UP (ref 27–34)
MCHC RBC-ENTMCNC: 32.7 GM/DL — SIGNIFICANT CHANGE UP (ref 32–36)
MCV RBC AUTO: 99.4 FL — SIGNIFICANT CHANGE UP (ref 80–100)
MONOCYTES # BLD AUTO: 0.8 K/UL — SIGNIFICANT CHANGE UP (ref 0–0.9)
MONOCYTES NFR BLD AUTO: 5.3 % — SIGNIFICANT CHANGE UP (ref 2–14)
NEUTROPHILS # BLD AUTO: 10.01 K/UL — HIGH (ref 1.8–7.4)
NEUTROPHILS NFR BLD AUTO: 66.1 % — SIGNIFICANT CHANGE UP (ref 43–77)
PHOSPHATE SERPL-MCNC: 2.9 MG/DL — SIGNIFICANT CHANGE UP (ref 2.4–4.7)
PLATELET # BLD AUTO: 355 K/UL — SIGNIFICANT CHANGE UP (ref 150–400)
POTASSIUM SERPL-MCNC: 4 MMOL/L — SIGNIFICANT CHANGE UP (ref 3.5–5.3)
POTASSIUM SERPL-SCNC: 4 MMOL/L — SIGNIFICANT CHANGE UP (ref 3.5–5.3)
RBC # BLD: 3.14 M/UL — LOW (ref 4.2–5.8)
RBC # FLD: 15.1 % — HIGH (ref 10.3–14.5)
SODIUM SERPL-SCNC: 140 MMOL/L — SIGNIFICANT CHANGE UP (ref 135–145)
SPECIMEN SOURCE: SIGNIFICANT CHANGE UP
SPECIMEN SOURCE: SIGNIFICANT CHANGE UP
WBC # BLD: 15.12 K/UL — HIGH (ref 3.8–10.5)
WBC # FLD AUTO: 15.12 K/UL — HIGH (ref 3.8–10.5)

## 2022-03-13 PROCEDURE — 72193 CT PELVIS W/DYE: CPT | Mod: 26

## 2022-03-13 RX ORDER — SODIUM CHLORIDE 9 MG/ML
1000 INJECTION, SOLUTION INTRAVENOUS
Refills: 0 | Status: DISCONTINUED | OUTPATIENT
Start: 2022-03-13 | End: 2022-03-21

## 2022-03-13 RX ADMIN — Medication 1 APPLICATION(S): at 05:14

## 2022-03-13 RX ADMIN — OXYCODONE HYDROCHLORIDE 10 MILLIGRAM(S): 5 TABLET ORAL at 20:48

## 2022-03-13 RX ADMIN — Medication 100 MILLIGRAM(S): at 14:28

## 2022-03-13 RX ADMIN — Medication 1 APPLICATION(S): at 23:45

## 2022-03-13 RX ADMIN — Medication 650 MILLIGRAM(S): at 23:46

## 2022-03-13 RX ADMIN — Medication 650 MILLIGRAM(S): at 00:00

## 2022-03-13 RX ADMIN — OXYCODONE HYDROCHLORIDE 10 MILLIGRAM(S): 5 TABLET ORAL at 07:59

## 2022-03-13 RX ADMIN — OXYCODONE HYDROCHLORIDE 10 MILLIGRAM(S): 5 TABLET ORAL at 21:48

## 2022-03-13 RX ADMIN — Medication 100 MILLIGRAM(S): at 22:37

## 2022-03-13 RX ADMIN — HEPARIN SODIUM 7500 UNIT(S): 5000 INJECTION INTRAVENOUS; SUBCUTANEOUS at 22:36

## 2022-03-13 RX ADMIN — OXYCODONE HYDROCHLORIDE 10 MILLIGRAM(S): 5 TABLET ORAL at 15:59

## 2022-03-13 RX ADMIN — HEPARIN SODIUM 7500 UNIT(S): 5000 INJECTION INTRAVENOUS; SUBCUTANEOUS at 14:28

## 2022-03-13 RX ADMIN — Medication 250 MILLIGRAM(S): at 06:13

## 2022-03-13 RX ADMIN — Medication 650 MILLIGRAM(S): at 19:48

## 2022-03-13 RX ADMIN — CEFEPIME 100 MILLIGRAM(S): 1 INJECTION, POWDER, FOR SOLUTION INTRAMUSCULAR; INTRAVENOUS at 18:07

## 2022-03-13 RX ADMIN — Medication 3 MILLILITER(S): at 08:17

## 2022-03-13 RX ADMIN — TAMSULOSIN HYDROCHLORIDE 0.4 MILLIGRAM(S): 0.4 CAPSULE ORAL at 22:37

## 2022-03-13 RX ADMIN — OXYCODONE HYDROCHLORIDE 10 MILLIGRAM(S): 5 TABLET ORAL at 09:14

## 2022-03-13 RX ADMIN — OXYCODONE HYDROCHLORIDE 10 MILLIGRAM(S): 5 TABLET ORAL at 16:49

## 2022-03-13 RX ADMIN — Medication 650 MILLIGRAM(S): at 12:06

## 2022-03-13 RX ADMIN — OXYCODONE HYDROCHLORIDE 10 MILLIGRAM(S): 5 TABLET ORAL at 05:09

## 2022-03-13 RX ADMIN — Medication 1 APPLICATION(S): at 00:01

## 2022-03-13 RX ADMIN — Medication 250 MILLIGRAM(S): at 18:08

## 2022-03-13 RX ADMIN — Medication 650 MILLIGRAM(S): at 18:07

## 2022-03-13 RX ADMIN — OXYCODONE HYDROCHLORIDE 10 MILLIGRAM(S): 5 TABLET ORAL at 12:28

## 2022-03-13 RX ADMIN — Medication 1 APPLICATION(S): at 16:49

## 2022-03-13 RX ADMIN — Medication 100 MILLIGRAM(S): at 05:13

## 2022-03-13 RX ADMIN — OXYCODONE HYDROCHLORIDE 10 MILLIGRAM(S): 5 TABLET ORAL at 03:41

## 2022-03-13 RX ADMIN — MONTELUKAST 10 MILLIGRAM(S): 4 TABLET, CHEWABLE ORAL at 22:36

## 2022-03-13 RX ADMIN — Medication 3 MILLILITER(S): at 04:56

## 2022-03-13 RX ADMIN — Medication 650 MILLIGRAM(S): at 19:47

## 2022-03-13 RX ADMIN — CHLORHEXIDINE GLUCONATE 1 APPLICATION(S): 213 SOLUTION TOPICAL at 12:00

## 2022-03-13 RX ADMIN — Medication 1 APPLICATION(S): at 12:00

## 2022-03-13 RX ADMIN — CEFEPIME 100 MILLIGRAM(S): 1 INJECTION, POWDER, FOR SOLUTION INTRAMUSCULAR; INTRAVENOUS at 05:13

## 2022-03-13 RX ADMIN — Medication 650 MILLIGRAM(S): at 01:00

## 2022-03-13 RX ADMIN — Medication 3 MILLILITER(S): at 15:28

## 2022-03-13 RX ADMIN — SODIUM CHLORIDE 75 MILLILITER(S): 9 INJECTION, SOLUTION INTRAVENOUS at 23:48

## 2022-03-13 RX ADMIN — HEPARIN SODIUM 7500 UNIT(S): 5000 INJECTION INTRAVENOUS; SUBCUTANEOUS at 05:14

## 2022-03-13 RX ADMIN — OXYCODONE HYDROCHLORIDE 10 MILLIGRAM(S): 5 TABLET ORAL at 12:06

## 2022-03-13 RX ADMIN — Medication 650 MILLIGRAM(S): at 12:28

## 2022-03-13 RX ADMIN — POLYETHYLENE GLYCOL 3350 17 GRAM(S): 17 POWDER, FOR SOLUTION ORAL at 12:00

## 2022-03-13 NOTE — DIETITIAN INITIAL EVALUATION ADULT. - OTHER INFO
65 yo male admitted with sepsis, SAVANNA, s/p I&D scrotal abscess (3/9). Pt continues to report extreme pain. Aware 4+ scrotal edema documented.

## 2022-03-13 NOTE — DIETITIAN INITIAL EVALUATION ADULT. - ORAL INTAKE PTA/DIET HISTORY
Pt reports good PO intake, having improved since admission. Pt follows low Na diet at home. Pt unaware of recent weight, confirms weight in house 280lbs, ht 68." Pt offered and declined nutrition education, RD to remain available.

## 2022-03-13 NOTE — PROGRESS NOTE ADULT - SUBJECTIVE AND OBJECTIVE BOX
HENRIQUE AMAYA  [id number]  66y, Male    Other specified disorders of male genital organs        Patient is a 66y old  Male who presents with a chief complaint of sepsis (11 Mar 2022 11:21)      HPI:  Patient has drainage and still with intermittent pain, radiates to left thigh. no fevers.     Allergies    penicillins (Unknown)    Intolerances        MEDICATIONS  (STANDING):  acetaminophen     Tablet .. 650 milliGRAM(s) Oral every 6 hours  albuterol/ipratropium for Nebulization 3 milliLiter(s) Nebulizer every 6 hours  BACItracin   Ointment 1 Application(s) Topical every 6 hours  cefepime   IVPB 2000 milliGRAM(s) IV Intermittent every 12 hours  chlorhexidine 2% Cloths 1 Application(s) Topical daily  heparin   Injectable 7500 Unit(s) SubCutaneous every 8 hours  influenza  Vaccine (HIGH DOSE) 0.7 milliLiter(s) IntraMuscular once  metroNIDAZOLE  IVPB 500 milliGRAM(s) IV Intermittent every 8 hours  montelukast 10 milliGRAM(s) Oral daily  polyethylene glycol 3350 17 Gram(s) Oral daily  potassium chloride    Tablet ER 40 milliEquivalent(s) Oral once  senna 2 Tablet(s) Oral at bedtime  simvastatin 40 milliGRAM(s) Oral at bedtime  tamsulosin 0.4 milliGRAM(s) Oral at bedtime  vancomycin  IVPB 1000 milliGRAM(s) IV Intermittent every 12 hours    MEDICATIONS  (PRN):  acetaminophen     Tablet .. 650 milliGRAM(s) Oral every 6 hours PRN Temp greater or equal to 38C (100.4F), Mild Pain (1 - 3)  oxyCODONE    IR 5 milliGRAM(s) Oral every 4 hours PRN Moderate Pain (4 - 6)  oxyCODONE    IR 10 milliGRAM(s) Oral every 4 hours PRN Severe Pain (7 - 10)      PAST MEDICAL & SURGICAL HISTORY:  HTN (hypertension)    COPD, mild    Prostate CA  s/p radiation 2013    Epididymo-orchitis    History of appendectomy  2000    History of colon resection  2012        I&O's Detail    12 Mar 2022 06:01  -  13 Mar 2022 07:00  --------------------------------------------------------  IN:    IV PiggyBack: 250 mL    IV PiggyBack: 200 mL    IV PiggyBack: 50 mL    Oral Fluid: 480 mL  Total IN: 980 mL    OUT:    Indwelling Catheter - Urethral (mL): 1500 mL  Total OUT: 1500 mL    Total NET: -520 mL          PE:    Vital Signs Last 24 Hrs  T(C): 37.4 (13 Mar 2022 07:54), Max: 37.4 (13 Mar 2022 07:54)  T(F): 99.4 (13 Mar 2022 07:54), Max: 99.4 (13 Mar 2022 07:54)  HR: 94 (13 Mar 2022 08:23) (94 - 103)  BP: 138/88 (13 Mar 2022 07:54) (122/79 - 149/83)  BP(mean): --  RR: 18 (13 Mar 2022 07:54) (18 - 19)  SpO2: 95% (13 Mar 2022 07:54) (93% - 97%)    Daily     Daily     PHYSICAL EXAM:      Constitutional: mildly incomfortable    Eyes: nl conjunctivae    Respiratory: no respiratory distress    Psychiatric: alert and oriented X 3        Labs:                          10.2   15.12 )-----------( 355      ( 13 Mar 2022 11:29 )             31.2       03-13    140  |  102  |  20.5<H>  ----------------------------<  134<H>  4.0   |  26.0  |  0.91    Ca    8.9      13 Mar 2022 11:29  Phos  2.9     03-13  Mg     1.8     03-13            Impression:    scrotal abscess    Plan:  CT pelvis  antibiotics    Som Naik MD  03-13-22 @ 12:59

## 2022-03-13 NOTE — DIETITIAN INITIAL EVALUATION ADULT. - PERTINENT LABORATORY DATA
03-13 Na140 mmol/L Glu 134 mg/dL<H> K+ 4.0 mmol/L Cr  0.91 mg/dL BUN 20.5 mg/dL<H> Phos 2.9 mg/dL Alb n/a   PAB n/a

## 2022-03-14 LAB
ABO RH CONFIRMATION: SIGNIFICANT CHANGE UP
ANION GAP SERPL CALC-SCNC: 9 MMOL/L — SIGNIFICANT CHANGE UP (ref 5–17)
ANISOCYTOSIS BLD QL: SLIGHT — SIGNIFICANT CHANGE UP
BASOPHILS # BLD AUTO: 0 K/UL — SIGNIFICANT CHANGE UP (ref 0–0.2)
BASOPHILS NFR BLD AUTO: 0 % — SIGNIFICANT CHANGE UP (ref 0–2)
BLASTS # FLD: 0.9 % — HIGH (ref 0–0)
BUN SERPL-MCNC: 16.1 MG/DL — SIGNIFICANT CHANGE UP (ref 8–20)
CALCIUM SERPL-MCNC: 8.9 MG/DL — SIGNIFICANT CHANGE UP (ref 8.6–10.2)
CHLORIDE SERPL-SCNC: 105 MMOL/L — SIGNIFICANT CHANGE UP (ref 98–107)
CO2 SERPL-SCNC: 29 MMOL/L — SIGNIFICANT CHANGE UP (ref 22–29)
CREAT SERPL-MCNC: 0.9 MG/DL — SIGNIFICANT CHANGE UP (ref 0.5–1.3)
EGFR: 93 ML/MIN/1.73M2 — SIGNIFICANT CHANGE UP
EOSINOPHIL # BLD AUTO: 0.18 K/UL — SIGNIFICANT CHANGE UP (ref 0–0.5)
EOSINOPHIL NFR BLD AUTO: 0.9 % — SIGNIFICANT CHANGE UP (ref 0–6)
GLUCOSE SERPL-MCNC: 102 MG/DL — HIGH (ref 70–99)
HCT VFR BLD CALC: 30.5 % — LOW (ref 39–50)
HGB BLD-MCNC: 9.8 G/DL — LOW (ref 13–17)
LACTATE SERPL-SCNC: 0.8 MMOL/L — SIGNIFICANT CHANGE UP (ref 0.5–2)
LYMPHOCYTES # BLD AUTO: 17.5 % — SIGNIFICANT CHANGE UP (ref 13–44)
LYMPHOCYTES # BLD AUTO: 3.57 K/UL — HIGH (ref 1–3.3)
MACROCYTES BLD QL: SLIGHT — SIGNIFICANT CHANGE UP
MCHC RBC-ENTMCNC: 32.1 GM/DL — SIGNIFICANT CHANGE UP (ref 32–36)
MCHC RBC-ENTMCNC: 32.8 PG — SIGNIFICANT CHANGE UP (ref 27–34)
MCV RBC AUTO: 102 FL — HIGH (ref 80–100)
METAMYELOCYTES # FLD: 3.5 % — HIGH (ref 0–0)
MONOCYTES # BLD AUTO: 1.24 K/UL — HIGH (ref 0–0.9)
MONOCYTES NFR BLD AUTO: 6.1 % — SIGNIFICANT CHANGE UP (ref 2–14)
MYELOCYTES NFR BLD: 0.9 % — HIGH (ref 0–0)
NEUTROPHILS # BLD AUTO: 13.41 K/UL — HIGH (ref 1.8–7.4)
NEUTROPHILS NFR BLD AUTO: 65.8 % — SIGNIFICANT CHANGE UP (ref 43–77)
PLAT MORPH BLD: NORMAL — SIGNIFICANT CHANGE UP
PLATELET # BLD AUTO: 395 K/UL — SIGNIFICANT CHANGE UP (ref 150–400)
POLYCHROMASIA BLD QL SMEAR: SLIGHT — SIGNIFICANT CHANGE UP
POTASSIUM SERPL-MCNC: 4.4 MMOL/L — SIGNIFICANT CHANGE UP (ref 3.5–5.3)
POTASSIUM SERPL-SCNC: 4.4 MMOL/L — SIGNIFICANT CHANGE UP (ref 3.5–5.3)
PROCALCITONIN SERPL-MCNC: 0.63 NG/ML — HIGH (ref 0.02–0.1)
RBC # BLD: 2.99 M/UL — LOW (ref 4.2–5.8)
RBC # FLD: 15.4 % — HIGH (ref 10.3–14.5)
RBC BLD AUTO: ABNORMAL
SARS-COV-2 RNA SPEC QL NAA+PROBE: SIGNIFICANT CHANGE UP
SODIUM SERPL-SCNC: 143 MMOL/L — SIGNIFICANT CHANGE UP (ref 135–145)
VANCOMYCIN TROUGH SERPL-MCNC: 14.7 UG/ML — SIGNIFICANT CHANGE UP (ref 10–20)
VARIANT LYMPHS # BLD: 4.4 % — SIGNIFICANT CHANGE UP (ref 0–6)
WBC # BLD: 20.38 K/UL — HIGH (ref 3.8–10.5)
WBC # FLD AUTO: 20.38 K/UL — HIGH (ref 3.8–10.5)

## 2022-03-14 PROCEDURE — 99223 1ST HOSP IP/OBS HIGH 75: CPT

## 2022-03-14 RX ORDER — MEROPENEM 1 G/30ML
1000 INJECTION INTRAVENOUS EVERY 8 HOURS
Refills: 0 | Status: COMPLETED | OUTPATIENT
Start: 2022-03-14 | End: 2022-03-21

## 2022-03-14 RX ADMIN — OXYCODONE HYDROCHLORIDE 10 MILLIGRAM(S): 5 TABLET ORAL at 01:21

## 2022-03-14 RX ADMIN — OXYCODONE HYDROCHLORIDE 10 MILLIGRAM(S): 5 TABLET ORAL at 05:32

## 2022-03-14 RX ADMIN — Medication 650 MILLIGRAM(S): at 00:46

## 2022-03-14 RX ADMIN — Medication 650 MILLIGRAM(S): at 05:32

## 2022-03-14 RX ADMIN — OXYCODONE HYDROCHLORIDE 10 MILLIGRAM(S): 5 TABLET ORAL at 06:32

## 2022-03-14 RX ADMIN — Medication 3 MILLILITER(S): at 20:45

## 2022-03-14 RX ADMIN — CHLORHEXIDINE GLUCONATE 1 APPLICATION(S): 213 SOLUTION TOPICAL at 08:34

## 2022-03-14 RX ADMIN — MONTELUKAST 10 MILLIGRAM(S): 4 TABLET, CHEWABLE ORAL at 22:23

## 2022-03-14 RX ADMIN — SIMVASTATIN 40 MILLIGRAM(S): 20 TABLET, FILM COATED ORAL at 22:23

## 2022-03-14 RX ADMIN — POLYETHYLENE GLYCOL 3350 17 GRAM(S): 17 POWDER, FOR SOLUTION ORAL at 11:25

## 2022-03-14 RX ADMIN — Medication 3 MILLILITER(S): at 17:01

## 2022-03-14 RX ADMIN — OXYCODONE HYDROCHLORIDE 10 MILLIGRAM(S): 5 TABLET ORAL at 12:30

## 2022-03-14 RX ADMIN — OXYCODONE HYDROCHLORIDE 10 MILLIGRAM(S): 5 TABLET ORAL at 11:27

## 2022-03-14 RX ADMIN — Medication 1 APPLICATION(S): at 16:09

## 2022-03-14 RX ADMIN — OXYCODONE HYDROCHLORIDE 10 MILLIGRAM(S): 5 TABLET ORAL at 22:23

## 2022-03-14 RX ADMIN — OXYCODONE HYDROCHLORIDE 10 MILLIGRAM(S): 5 TABLET ORAL at 02:21

## 2022-03-14 RX ADMIN — CEFEPIME 100 MILLIGRAM(S): 1 INJECTION, POWDER, FOR SOLUTION INTRAMUSCULAR; INTRAVENOUS at 17:07

## 2022-03-14 RX ADMIN — Medication 1 APPLICATION(S): at 05:32

## 2022-03-14 RX ADMIN — CEFEPIME 100 MILLIGRAM(S): 1 INJECTION, POWDER, FOR SOLUTION INTRAMUSCULAR; INTRAVENOUS at 05:33

## 2022-03-14 RX ADMIN — HEPARIN SODIUM 7500 UNIT(S): 5000 INJECTION INTRAVENOUS; SUBCUTANEOUS at 05:33

## 2022-03-14 RX ADMIN — HEPARIN SODIUM 7500 UNIT(S): 5000 INJECTION INTRAVENOUS; SUBCUTANEOUS at 12:58

## 2022-03-14 RX ADMIN — OXYCODONE HYDROCHLORIDE 10 MILLIGRAM(S): 5 TABLET ORAL at 22:45

## 2022-03-14 RX ADMIN — OXYCODONE HYDROCHLORIDE 10 MILLIGRAM(S): 5 TABLET ORAL at 16:54

## 2022-03-14 RX ADMIN — Medication 650 MILLIGRAM(S): at 16:15

## 2022-03-14 RX ADMIN — Medication 100 MILLIGRAM(S): at 05:33

## 2022-03-14 RX ADMIN — Medication 1 APPLICATION(S): at 09:16

## 2022-03-14 RX ADMIN — MEROPENEM 100 MILLIGRAM(S): 1 INJECTION INTRAVENOUS at 22:23

## 2022-03-14 RX ADMIN — Medication 650 MILLIGRAM(S): at 11:25

## 2022-03-14 RX ADMIN — Medication 250 MILLIGRAM(S): at 16:14

## 2022-03-14 RX ADMIN — Medication 650 MILLIGRAM(S): at 16:54

## 2022-03-14 RX ADMIN — HEPARIN SODIUM 7500 UNIT(S): 5000 INJECTION INTRAVENOUS; SUBCUTANEOUS at 23:52

## 2022-03-14 RX ADMIN — OXYCODONE HYDROCHLORIDE 10 MILLIGRAM(S): 5 TABLET ORAL at 16:14

## 2022-03-14 RX ADMIN — Medication 650 MILLIGRAM(S): at 12:30

## 2022-03-14 RX ADMIN — Medication 3 MILLILITER(S): at 04:08

## 2022-03-14 RX ADMIN — Medication 3 MILLILITER(S): at 08:56

## 2022-03-14 RX ADMIN — TAMSULOSIN HYDROCHLORIDE 0.4 MILLIGRAM(S): 0.4 CAPSULE ORAL at 22:23

## 2022-03-14 RX ADMIN — Medication 250 MILLIGRAM(S): at 05:32

## 2022-03-14 RX ADMIN — Medication 650 MILLIGRAM(S): at 06:32

## 2022-03-14 NOTE — PROGRESS NOTE ADULT - ASSESSMENT
65 yo with scrotal abscess, POD#5 s/p I&D, afebrile, leukocytosis  - portion of scrotum opened, sutures opened, no drainage, irrigated with betadine and NS, xeroform applied to anterior scrotum, penrose left in place  - no need for OR today  - consistent carb diet  - cont abx  - trend leukocytosis  - ID consult called  - cont wheeler catheter  - OOB to chair  - PT 65 yo with scrotal abscess, POD#5 s/p I&D, afebrile, leukocytosis  - portion of scrotum opened, sutures opened, no drainage, irrigated with betadine and NS, xeroform applied to anterior scrotum, penrose left in place  - no need for OR today  - consistent carb diet  - cont abx  - trend leukocytosis  - procalcitonin level  - ID consult called  - cont wheeler catheter  - OOB to chair  - PT 67 yo with scrotal abscess, POD#5 s/p I&D, afebrile, leukocytosis  - portion of scrotum opened, sutures opened, no drainage, irrigated with betadine and NS, xeroform applied to anterior scrotum, penrose left in place  - no need for OR today  - DASH diet  - cont abx  - trend leukocytosis  - procalcitonin level  - ID consult called  - cont wheeler catheter  - OOB to chair  - PT 65 yo with scrotal abscess, POD#5 s/p I&D, afebrile, leukocytosis  - portion of scrotum opened, sutures opened, no drainage, irrigated with betadine and NS, xeroform applied to anterior scrotum, penrose left in place  - no need for OR today  - DASH diet  - cont abx  - trend leukocytosis  - procalcitonin level  - ID consult called  - cont wheeler catheter  - OOB to chair  - PT  - silvadene to scrotum daily

## 2022-03-14 NOTE — PROGRESS NOTE ADULT - SUBJECTIVE AND OBJECTIVE BOX
Subjective:66yMale s/p recent right orchiectomy for chronic abscesses, hydrocele.  Pt readmitted with sepsis, right scrotal abscess.  POD#5 s/p right scrotal abscess I&D, washout of right scrotum.  Pt feeling well, minimal c/o pain, remains afebrile, has increasing leukocytosis.    Peters: yellow urine    Vital Signs Last 24 Hrs  T(C): 36.4 (14 Mar 2022 09:00), Max: 37.3 (14 Mar 2022 06:00)  T(F): 97.5 (14 Mar 2022 09:00), Max: 99.2 (14 Mar 2022 06:00)  HR: 111 (14 Mar 2022 09:00) (92 - 111)  BP: 164/77 (14 Mar 2022 09:00) (136/75 - 164/77)  BP(mean): --  RR: 19 (14 Mar 2022 09:00) (17 - 19)  SpO2: 96% (14 Mar 2022 09:00) (91% - 96%)  I&O's Detail    13 Mar 2022 07:01  -  14 Mar 2022 07:00  --------------------------------------------------------  IN:    IV PiggyBack: 400 mL  Total IN: 400 mL    OUT:    Indwelling Catheter - Urethral (mL): 850 mL  Total OUT: 850 mL    Total NET: -450 mL      14 Mar 2022 07:01  -  14 Mar 2022 10:43  --------------------------------------------------------  IN:    Lactated Ringers: 300 mL  Total IN: 300 mL    OUT:  Total OUT: 0 mL    Total NET: 300 mL          Labs:                        9.8    20.38 )-----------( 395      ( 14 Mar 2022 05:47 )             30.5     03-14    143  |  105  |  16.1  ----------------------------<  102<H>  4.4   |  29.0  |  0.90    Ca    8.9      14 Mar 2022 05:47  Phos  2.9     03-13  Mg     1.8     03-13      radiology: < from: CT Pelvis w/ IV Cont (03.13.22 @ 21:50) >    ACC: [id number] EXAM:  CT PELVIS ONLY IC                          PROCEDURE DATE:  03/13/2022          INTERPRETATION:  PROCEDURE INFORMATION:  Exam: CT Abdomen And Pelvis With Contrast  Exam date and time: 3/13/2022 9:40 PM  Age: 66 years old  Clinical indication: Abdominal pain    TECHNIQUE:  Imaging protocol: Computed tomography of the abdomen and pelvis with   contrast.  Total images: 458    COMPARISON:  CT PELVIS 3/8/2022 8:01 PM    FINDINGS:  Tubes, catheters and devices: Urinary bladder catheter present.    Kidneys and ureters: Punctate stone lower pole left kidney. Stable right   distal ureteral calculus with mild hydroureter.    Stomach and bowel: Unremarkable. No obstruction. No mucosal thickening.  Appendix: No evidence of appendicitis.    Intraperitoneal space: Unremarkable. No free air. No significant fluid  collection.  Vasculature: Atherosclerosis is evident.  Lymph nodes: Unremarkable. No enlarged lymph nodes.    Urinary bladder: Peters catheter.  Reproductive: Extensive inflammation and edema is again noted involving   the  penis and scrotum. There has been interval incision and drainage of the  previously demonstrated right scrotal abscess with drain in place. No   definite  residual drainable fluid collection is identified. Continued soft tissue   gas.  Sub optimal characterization of the testicles.    Bones/joints: Unremarkable. No acute fracture.  Soft tissues:  No additional areas of soft tissue inflammation.    IMPRESSION:  Extensive inflammation and edema isagain noted involving the penis and  scrotum. There has been interval incision and drainage of the previously  demonstrated right scrotal abscess with drain in place. No definite   residual  drainable fluid collection is identified. Continued soft tissue gas.    < end of copied text >

## 2022-03-15 LAB
ANION GAP SERPL CALC-SCNC: 11 MMOL/L — SIGNIFICANT CHANGE UP (ref 5–17)
BUN SERPL-MCNC: 15.2 MG/DL — SIGNIFICANT CHANGE UP (ref 8–20)
CALCIUM SERPL-MCNC: 8.7 MG/DL — SIGNIFICANT CHANGE UP (ref 8.6–10.2)
CHLORIDE SERPL-SCNC: 103 MMOL/L — SIGNIFICANT CHANGE UP (ref 98–107)
CO2 SERPL-SCNC: 27 MMOL/L — SIGNIFICANT CHANGE UP (ref 22–29)
CREAT SERPL-MCNC: 0.83 MG/DL — SIGNIFICANT CHANGE UP (ref 0.5–1.3)
EGFR: 95 ML/MIN/1.73M2 — SIGNIFICANT CHANGE UP
GLUCOSE SERPL-MCNC: 111 MG/DL — HIGH (ref 70–99)
HCT VFR BLD CALC: 29.4 % — LOW (ref 39–50)
HGB BLD-MCNC: 9.7 G/DL — LOW (ref 13–17)
MAGNESIUM SERPL-MCNC: 1.8 MG/DL — SIGNIFICANT CHANGE UP (ref 1.6–2.6)
MCHC RBC-ENTMCNC: 33 GM/DL — SIGNIFICANT CHANGE UP (ref 32–36)
MCHC RBC-ENTMCNC: 33.2 PG — SIGNIFICANT CHANGE UP (ref 27–34)
MCV RBC AUTO: 100.7 FL — HIGH (ref 80–100)
MRSA PCR RESULT.: DETECTED
PHOSPHATE SERPL-MCNC: 2.9 MG/DL — SIGNIFICANT CHANGE UP (ref 2.4–4.7)
PLATELET # BLD AUTO: 408 K/UL — HIGH (ref 150–400)
POTASSIUM SERPL-MCNC: 4.1 MMOL/L — SIGNIFICANT CHANGE UP (ref 3.5–5.3)
POTASSIUM SERPL-SCNC: 4.1 MMOL/L — SIGNIFICANT CHANGE UP (ref 3.5–5.3)
RBC # BLD: 2.92 M/UL — LOW (ref 4.2–5.8)
RBC # FLD: 15.2 % — HIGH (ref 10.3–14.5)
S AUREUS DNA NOSE QL NAA+PROBE: DETECTED
SODIUM SERPL-SCNC: 141 MMOL/L — SIGNIFICANT CHANGE UP (ref 135–145)
WBC # BLD: 19.47 K/UL — HIGH (ref 3.8–10.5)
WBC # FLD AUTO: 19.47 K/UL — HIGH (ref 3.8–10.5)

## 2022-03-15 PROCEDURE — 99222 1ST HOSP IP/OBS MODERATE 55: CPT

## 2022-03-15 RX ORDER — LOSARTAN POTASSIUM 100 MG/1
25 TABLET, FILM COATED ORAL DAILY
Refills: 0 | Status: DISCONTINUED | OUTPATIENT
Start: 2022-03-15 | End: 2022-03-17

## 2022-03-15 RX ADMIN — Medication 40 MILLIEQUIVALENT(S): at 07:58

## 2022-03-15 RX ADMIN — OXYCODONE HYDROCHLORIDE 10 MILLIGRAM(S): 5 TABLET ORAL at 03:05

## 2022-03-15 RX ADMIN — OXYCODONE HYDROCHLORIDE 10 MILLIGRAM(S): 5 TABLET ORAL at 08:50

## 2022-03-15 RX ADMIN — Medication 3 MILLILITER(S): at 16:27

## 2022-03-15 RX ADMIN — OXYCODONE HYDROCHLORIDE 10 MILLIGRAM(S): 5 TABLET ORAL at 03:35

## 2022-03-15 RX ADMIN — OXYCODONE HYDROCHLORIDE 10 MILLIGRAM(S): 5 TABLET ORAL at 18:27

## 2022-03-15 RX ADMIN — Medication 650 MILLIGRAM(S): at 13:00

## 2022-03-15 RX ADMIN — Medication 3 MILLILITER(S): at 02:49

## 2022-03-15 RX ADMIN — MEROPENEM 100 MILLIGRAM(S): 1 INJECTION INTRAVENOUS at 13:57

## 2022-03-15 RX ADMIN — HEPARIN SODIUM 7500 UNIT(S): 5000 INJECTION INTRAVENOUS; SUBCUTANEOUS at 13:57

## 2022-03-15 RX ADMIN — Medication 650 MILLIGRAM(S): at 17:52

## 2022-03-15 RX ADMIN — Medication 650 MILLIGRAM(S): at 01:43

## 2022-03-15 RX ADMIN — Medication 650 MILLIGRAM(S): at 02:13

## 2022-03-15 RX ADMIN — HEPARIN SODIUM 7500 UNIT(S): 5000 INJECTION INTRAVENOUS; SUBCUTANEOUS at 06:17

## 2022-03-15 RX ADMIN — Medication 1 APPLICATION(S): at 15:58

## 2022-03-15 RX ADMIN — MEROPENEM 100 MILLIGRAM(S): 1 INJECTION INTRAVENOUS at 22:12

## 2022-03-15 RX ADMIN — Medication 650 MILLIGRAM(S): at 18:27

## 2022-03-15 RX ADMIN — Medication 250 MILLIGRAM(S): at 06:19

## 2022-03-15 RX ADMIN — Medication 250 MILLIGRAM(S): at 17:52

## 2022-03-15 RX ADMIN — MONTELUKAST 10 MILLIGRAM(S): 4 TABLET, CHEWABLE ORAL at 12:13

## 2022-03-15 RX ADMIN — Medication 3 MILLILITER(S): at 20:28

## 2022-03-15 RX ADMIN — Medication 650 MILLIGRAM(S): at 23:59

## 2022-03-15 RX ADMIN — OXYCODONE HYDROCHLORIDE 10 MILLIGRAM(S): 5 TABLET ORAL at 13:00

## 2022-03-15 RX ADMIN — Medication 1 APPLICATION(S): at 01:43

## 2022-03-15 RX ADMIN — Medication 650 MILLIGRAM(S): at 12:13

## 2022-03-15 RX ADMIN — OXYCODONE HYDROCHLORIDE 10 MILLIGRAM(S): 5 TABLET ORAL at 22:15

## 2022-03-15 RX ADMIN — Medication 650 MILLIGRAM(S): at 07:30

## 2022-03-15 RX ADMIN — Medication 650 MILLIGRAM(S): at 06:17

## 2022-03-15 RX ADMIN — OXYCODONE HYDROCHLORIDE 10 MILLIGRAM(S): 5 TABLET ORAL at 22:45

## 2022-03-15 RX ADMIN — MEROPENEM 100 MILLIGRAM(S): 1 INJECTION INTRAVENOUS at 06:16

## 2022-03-15 RX ADMIN — Medication 1 APPLICATION(S): at 23:59

## 2022-03-15 RX ADMIN — Medication 1 APPLICATION(S): at 12:18

## 2022-03-15 RX ADMIN — HEPARIN SODIUM 7500 UNIT(S): 5000 INJECTION INTRAVENOUS; SUBCUTANEOUS at 22:16

## 2022-03-15 RX ADMIN — Medication 3 MILLILITER(S): at 10:08

## 2022-03-15 RX ADMIN — TAMSULOSIN HYDROCHLORIDE 0.4 MILLIGRAM(S): 0.4 CAPSULE ORAL at 22:16

## 2022-03-15 RX ADMIN — Medication 1 APPLICATION(S): at 06:16

## 2022-03-15 RX ADMIN — OXYCODONE HYDROCHLORIDE 10 MILLIGRAM(S): 5 TABLET ORAL at 17:51

## 2022-03-15 RX ADMIN — Medication 1 APPLICATION(S): at 18:28

## 2022-03-15 RX ADMIN — OXYCODONE HYDROCHLORIDE 10 MILLIGRAM(S): 5 TABLET ORAL at 12:13

## 2022-03-15 RX ADMIN — OXYCODONE HYDROCHLORIDE 10 MILLIGRAM(S): 5 TABLET ORAL at 07:58

## 2022-03-15 RX ADMIN — LOSARTAN POTASSIUM 25 MILLIGRAM(S): 100 TABLET, FILM COATED ORAL at 17:52

## 2022-03-15 RX ADMIN — CHLORHEXIDINE GLUCONATE 1 APPLICATION(S): 213 SOLUTION TOPICAL at 12:02

## 2022-03-15 RX ADMIN — SIMVASTATIN 40 MILLIGRAM(S): 20 TABLET, FILM COATED ORAL at 22:16

## 2022-03-15 NOTE — PROGRESS NOTE ADULT - ASSESSMENT
65 yo male s/p recent right orchiectomy, admitted with sepsis, SAVANNA, scrotal abscess, POD#6 s/p I&D scrotal abscess  - SAVANNA resolved  - leukocytosis mildly improved  - abx changed to merem yesterday, cont vanco  - pt remains afebrile  - silvadene to scrotal eschar daily  - cont wheeler cath  - bacitracin to penis daily  - wheeler repositioned to avoid any pressure on penis or shaft  - trend wbc  - OOB to chair, ambulate  - PT  - pt's and sisters questions answered to their satisfaction  - appreciate ID recs

## 2022-03-15 NOTE — CONSULT NOTE ADULT - SUBJECTIVE AND OBJECTIVE BOX
67 y/o M with hx of HTN, HLD, prostate CA (S/P radiation therapy in 2013), COPD, Active smoker, Epididymo-orchitis, he was having right scrotal pain for 5 months, he noticed an abscess on the right scrotum. States he has been on and off abx with minimal relief, he had US doppler of scrotum on 1/21/22 which found Complex 1.6 cm sized right-sided intratesticular and an adjacent 1 cm sized probably extratesticular lesion concerning for intratesticular and extratesticular abscesses and associated large hydrocele, he had an MRI 02/03/2022 [facility]: two right side extratesticular abscesses, likely in the epididymal tail 1.6 + 1 cm. also a large right hydrocele, he is came in for elective scrotal exploration, right hydrocelectomy, right drainage or abscess and possible orchiectomy with Dr. Damián Parekh on 3/1/2022, he is s/p Right orchiectomy on 03/01, he discharge afterwards, he came back again on 03/08 with worsening swelling and pain, in the ER he was noted to be hypotensive,  Labs reveal leukocytosis, SAVANNA and lactic admitted in Surgical ICU for sepsis due to scrotal abscess, his cultures obtained and he was startec on IV antibiotics, his lactate improved with IV fluids, he had CT of abdomen and Pelvis done that showed Extensive subcutaneous edema in the penis and scrotum with a small amount of air in the right scrotum which is likely postsurgical.   Infectious etiology not be completely excluded, Left testis cannot clearly be seen on this study, he was seen by Urology, they took him ot the OR and did I&D, he was monitored in the ICU, later on his sepsis improved and then transferred under Urology service, he was on Vanc, metro and cefepime, his blood cultures came negative, his abscess cultures came positive SERRATIA /MRSA AND BACTEROIDES, ID saw the patient and changed antibiotics to Meropenum and  Vancomycin, he has wheeler's catheter, patient is feeling better, his swelling is improving, denies fever, chills, chest pain, sob, he has intermittent cough.       REVIEW OF SYSTEMS:    CONSTITUTIONAL: No fever, fatigue  RESPIRATORY: No cough, No shortness of breath  CARDIOVASCULAR: No chest pain, palpitations  GASTROINTESTINAL: No abdominal, No nausea, vomiting  NEUROLOGICAL: No headaches,  loss of strength.  MISCELLANEOUS: Pain in the scrotal area, better with pain meds     Allergies:  	penicillins: Drug Category, Unknown    Home Medications:   * Incomplete Medication History as of 23-Feb-2022 16:44 documented in Structured Notes  · 	hydroCHLOROthiazide 25 mg oral tablet: Last Dose Taken:  , 1 tab(s) orally once a day  · 	losartan 100 mg oral tablet: Last Dose Taken:  , 1 tab(s) orally once a day  · 	simvastatin 40 mg oral tablet: Last Dose Taken:  , 1 tab(s) orally once a day (at bedtime)  · 	montelukast 10 mg oral tablet: Last Dose Taken:  , 1 tab(s) orally once a day  · 	tamsulosin 0.4 mg oral capsule: Last Dose Taken:  , 1 cap(s) orally once a day  · 	aspirin 81 mg oral delayed release tablet: Last Dose Taken:  , 1 tab(s) orally once a day      PAST MEDICAL HISTORY:  COPD, mild     Epididymo-orchitis     HTN (hypertension)     Prostate CA s/p radiation 2013.     PAST SURGICAL HISTORY:  History of appendectomy 2000    History of colon resection 2012.     FAMILY HISTORY:  Father  Still living? Unknown  Family history not known due to adoption, Age at diagnosis: Age Unknown    Mother  Still living? Unknown  Family history not known due to adoption, Age at diagnosis: Age Unknown.     COVID-19 Vaccine Assessment:  · History of COVID-19 vaccination	Yes  · Brand of COVID-19 vaccination	Pfizer dose 1, 2, and 3  · Date of last vaccination	04-Oct-2021  · Have you had a COVID-19 booster?	Yes  · Brand of COVID-19 booster	Pfizer  · Date of latest COVID-19 booster	04-Oct-2021  · Will the patient accept the COVID-19 vaccine if eligible and it is available?	Not applicable    Social History: he has Hx of smoking, not drinking or using any drugs.                Vital Signs Last 24 Hrs  T(C): 37.6 (15 Mar 2022 08:00), Max: 37.6 (15 Mar 2022 05:34)  T(F): 99.6 (15 Mar 2022 08:00), Max: 99.7 (15 Mar 2022 05:34)  HR: 100 (15 Mar 2022 10:08) (98 - 108)  BP: 154/96 (15 Mar 2022 08:00) (127/82 - 154/96)  RR: 18 (15 Mar 2022 08:00) (18 - 20)  SpO2: 93% (15 Mar 2022 10:08) (92% - 95%)    PHYSICAL EXAM:    GENERAL: Middle age Obese male looking comfortable sitting on the chair   HEENT: PERRL, +EOMI  NECK: soft, Supple, No JVD,   CHEST/LUNG: Clear to auscultate bilaterally; No wheezing  HEART: S1S2+, Regular rate and rhythm; No murmurs  ABDOMEN: Soft, Nontender, Nondistended; Bowel sounds present  EXTREMITIES:  2+ Peripheral Pulses, No edema  SKIN: No rashes or lesions  NEURO: AAOX3, no focal deficits, no motor r sensory loss  PSYCH: normal mood  Genital exam: Swelling and tederness of the penis, wheeler's in place      LABS:                        9.7    19.47 )-----------( 408      ( 15 Mar 2022 05:52 )             29.4     03-15    141  |  103  |  15.2  ----------------------------<  111<H>  4.1   |  27.0  |  0.83    Ca    8.7      15 Mar 2022 05:52  Phos  2.9     03-15  Mg     1.8     03-15              I&O's Summary    14 Mar 2022 07:01  -  15 Mar 2022 07:00  --------------------------------------------------------  IN: 450 mL / OUT: 1350 mL / NET: -900 mL        MEDICATIONS  (STANDING):  acetaminophen     Tablet .. 650 milliGRAM(s) Oral every 6 hours  albuterol/ipratropium for Nebulization 3 milliLiter(s) Nebulizer every 6 hours  BACItracin   Ointment 1 Application(s) Topical every 6 hours  chlorhexidine 2% Cloths 1 Application(s) Topical daily  heparin   Injectable 7500 Unit(s) SubCutaneous every 8 hours  influenza  Vaccine (HIGH DOSE) 0.7 milliLiter(s) IntraMuscular once  lactated ringers. 1000 milliLiter(s) (75 mL/Hr) IV Continuous <Continuous>  meropenem  IVPB 1000 milliGRAM(s) IV Intermittent every 8 hours  montelukast 10 milliGRAM(s) Oral daily  polyethylene glycol 3350 17 Gram(s) Oral daily  senna 2 Tablet(s) Oral at bedtime  silver sulfADIAZINE 1% Cream 1 Application(s) Topical daily  simvastatin 40 milliGRAM(s) Oral at bedtime  tamsulosin 0.4 milliGRAM(s) Oral at bedtime  vancomycin  IVPB 1000 milliGRAM(s) IV Intermittent every 12 hours    MEDICATIONS  (PRN):  acetaminophen     Tablet .. 650 milliGRAM(s) Oral every 6 hours PRN Temp greater or equal to 38C (100.4F), Mild Pain (1 - 3)  oxyCODONE    IR 5 milliGRAM(s) Oral every 4 hours PRN Moderate Pain (4 - 6)  oxyCODONE    IR 10 milliGRAM(s) Oral every 4 hours PRN Severe Pain (7 - 10)

## 2022-03-15 NOTE — CONSULT NOTE ADULT - ASSESSMENT
67 y/o M with hx of HTN, HLD, prostate CA (S/P radiation therapy in 2013), COPD, Active smoker, Epididymo-orchitis, he was having right scrotal pain for 5 months, he noticed an abscess on the right scrotum. States he has been on and off abx with minimal relief, he had US doppler of scrotum on 1/21/22 which found Complex 1.6 cm sized right-sided intratesticular and an adjacent 1 cm sized probably extratesticular lesion concerning for intratesticular and extratesticular abscesses and associated large hydrocele, he had an MRI 02/03/2022 [facility]: two right side extratesticular abscesses, likely in the epididymal tail 1.6 + 1 cm. also a large right hydrocele, he is came in for elective scrotal exploration, right hydrocelectomy, right drainage or abscess and possible orchiectomy with Dr. Damián Parekh on 3/1/2022, he is s/p Right orchiectomy on 03/01, he discharge afterwards, he came back again on 03/08 with worsening swelling and pain, in the ER he was noted to be hypotensive,  Labs reveal leukocytosis, SAVANNA and lactic admitted in Surgical ICU for sepsis due to scrotal abscess, his cultures obtained and he was startec on IV antibiotics, his lactate improved with IV fluids, he had CT of abdomen and Pelvis done that showed Extensive subcutaneous edema in the penis and scrotum with a small amount of air in the right scrotum which is likely postsurgical.   Infectious etiology not be completely excluded, Left testis cannot clearly be seen on this study, he was seen by Urology, they took him ot the OR and did I&D, he was monitored in the ICU, later on his sepsis improved and then transferred under Urology service, he was on Vanc, metro and cefepime, his blood cultures came negative, his abscess cultures came positive SERRATIA /MRSA AND BACTEROIDES, ID saw the patient and changed antibiotics to Meropenum and  Vancomycin, he has wheeler's catheter, patient is feeling better, his swelling is improving, denies fever, chills, chest pain, sob, he has intermittent cough, medicine was consulted for comanagement.     Plan:     Scrotal Abscess s/p I&D:   his blood cultures came negative, his abscess cultures came positive SERRATIA /MRSA AND BACTEROIDES, ID saw the patient and changed antibiotics to Meropenum and  Vancomycin, he has wheeler's catheter,   pain meds as per primary team   wound care  Scrotal support  will follow along with ID and Urology.    Sepsis POA: resolved now.     HTN: patient was on hydrochlorothiazide 25 mg once a day and losartan 100 mg once a day, will start Losartan 25mg daily with holding parameters.      HLD: Will continue with Simvastatin 40 mg once a day (at bedtime).     Hx of COPD: montelukast 10 mg once a day, duo nebs as needed    Hx of Prostate cancer: Tamsulosin 0.4 mg once a day    Hx of smoking: Nicotine patches offered, counselled for cessation.     DVT prophylaxis: Heparin SC  67 y/o M with hx of HTN, HLD, prostate CA (S/P radiation therapy in 2013), COPD, Active smoker, Epididymo-orchitis, he was having right scrotal pain for 5 months, he noticed an abscess on the right scrotum. States he has been on and off abx with minimal relief, he had US doppler of scrotum on 1/21/22 which found Complex 1.6 cm sized right-sided intratesticular and an adjacent 1 cm sized probably extratesticular lesion concerning for intratesticular and extratesticular abscesses and associated large hydrocele, he had an MRI 02/03/2022 [facility]: two right side extratesticular abscesses, likely in the epididymal tail 1.6 + 1 cm. also a large right hydrocele, he is came in for elective scrotal exploration, right hydrocelectomy, right drainage or abscess and possible orchiectomy with Dr. Damián Parekh on 3/1/2022, he is s/p Right orchiectomy on 03/01, he discharge afterwards, he came back again on 03/08 with worsening swelling and pain, in the ER he was noted to be hypotensive,  Labs reveal leukocytosis, SAVANNA and lactic admitted in Surgical ICU for sepsis due to scrotal abscess, his cultures obtained and he was startec on IV antibiotics, his lactate improved with IV fluids, he had CT of abdomen and Pelvis done that showed Extensive subcutaneous edema in the penis and scrotum with a small amount of air in the right scrotum which is likely postsurgical.   Infectious etiology not be completely excluded, Left testis cannot clearly be seen on this study, he was seen by Urology, they took him ot the OR and did I&D, he was monitored in the ICU, later on his sepsis improved and then transferred under Urology service, he was on Vanc, metro and cefepime, his blood cultures came negative, his abscess cultures came positive SERRATIA /MRSA AND BACTEROIDES, ID saw the patient and changed antibiotics to Meropenum and  Vancomycin, he has wheeler's catheter, patient is feeling better, his swelling is improving, denies fever, chills, chest pain, sob, he has intermittent cough, medicine was consulted for comanagement.     Plan:     Scrotal Abscess s/p I&D:   his blood cultures came negative, his abscess cultures came positive SERRATIA /MRSA AND BACTEROIDES, ID saw the patient and changed antibiotics to Meropenum and  Vancomycin, he has wheeler's catheter,   pain meds as per primary team   wound care  Scrotal support  White count is up  will follow along with ID and Urology.    Sepsis POA: resolved now.     HTN: patient was on hydrochlorothiazide 25 mg once a day and losartan 100 mg once a day, will start Losartan 25mg daily with holding parameters.      HLD: Will continue with Simvastatin 40 mg once a day (at bedtime).     Hx of COPD: montelukast 10 mg once a day, duo nebs as needed    Hx of Prostate cancer: Tamsulosin 0.4 mg once a day    Hx of smoking: Nicotine patches offered, counselled for cessation.     DVT prophylaxis: Heparin SC

## 2022-03-15 NOTE — PROGRESS NOTE ADULT - SUBJECTIVE AND OBJECTIVE BOX
Subjective:66yMale admitted with sepsis, scrotal abscess. POD#6 s/p I&D right scrotal abscess.  pt resting relatively comfortably, has occasional c/o pain in penis. Pt has been getting OOB to chair. Pt remains afebrile.    Peters: yellow    Vital Signs Last 24 Hrs  T(C): 37.6 (15 Mar 2022 08:00), Max: 37.6 (15 Mar 2022 05:34)  T(F): 99.6 (15 Mar 2022 08:00), Max: 99.7 (15 Mar 2022 05:34)  HR: 100 (15 Mar 2022 10:08) (98 - 108)  BP: 154/96 (15 Mar 2022 08:00) (127/82 - 154/96)  BP(mean): --  RR: 18 (15 Mar 2022 08:00) (18 - 20)  SpO2: 93% (15 Mar 2022 10:08) (92% - 95%)  I&O's Detail    14 Mar 2022 07:01  -  15 Mar 2022 07:00  --------------------------------------------------------  IN:    Lactated Ringers: 450 mL  Total IN: 450 mL    OUT:    Indwelling Catheter - Urethral (mL): 1350 mL  Total OUT: 1350 mL    Total NET: -900 mL          Labs:                        9.7    19.47 )-----------( 408      ( 15 Mar 2022 05:52 )             29.4     03-15    141  |  103  |  15.2  ----------------------------<  111<H>  4.1   |  27.0  |  0.83    Ca    8.7      15 Mar 2022 05:52  Phos  2.9     03-15  Mg     1.8     03-15

## 2022-03-16 ENCOUNTER — APPOINTMENT (OUTPATIENT)
Dept: UROLOGY | Facility: CLINIC | Age: 67
End: 2022-03-16

## 2022-03-16 LAB
ANION GAP SERPL CALC-SCNC: 10 MMOL/L — SIGNIFICANT CHANGE UP (ref 5–17)
BUN SERPL-MCNC: 12.8 MG/DL — SIGNIFICANT CHANGE UP (ref 8–20)
CALCIUM SERPL-MCNC: 8.8 MG/DL — SIGNIFICANT CHANGE UP (ref 8.6–10.2)
CHLORIDE SERPL-SCNC: 103 MMOL/L — SIGNIFICANT CHANGE UP (ref 98–107)
CO2 SERPL-SCNC: 27 MMOL/L — SIGNIFICANT CHANGE UP (ref 22–29)
CREAT SERPL-MCNC: 0.86 MG/DL — SIGNIFICANT CHANGE UP (ref 0.5–1.3)
EGFR: 94 ML/MIN/1.73M2 — SIGNIFICANT CHANGE UP
GLUCOSE SERPL-MCNC: 129 MG/DL — HIGH (ref 70–99)
HCT VFR BLD CALC: 29 % — LOW (ref 39–50)
HGB BLD-MCNC: 9.5 G/DL — LOW (ref 13–17)
MCHC RBC-ENTMCNC: 32.8 GM/DL — SIGNIFICANT CHANGE UP (ref 32–36)
MCHC RBC-ENTMCNC: 33.6 PG — SIGNIFICANT CHANGE UP (ref 27–34)
MCV RBC AUTO: 102.5 FL — HIGH (ref 80–100)
PLATELET # BLD AUTO: 421 K/UL — HIGH (ref 150–400)
POTASSIUM SERPL-MCNC: 4.7 MMOL/L — SIGNIFICANT CHANGE UP (ref 3.5–5.3)
POTASSIUM SERPL-SCNC: 4.7 MMOL/L — SIGNIFICANT CHANGE UP (ref 3.5–5.3)
RBC # BLD: 2.83 M/UL — LOW (ref 4.2–5.8)
RBC # FLD: 15.1 % — HIGH (ref 10.3–14.5)
SODIUM SERPL-SCNC: 140 MMOL/L — SIGNIFICANT CHANGE UP (ref 135–145)
VANCOMYCIN TROUGH SERPL-MCNC: 10.5 UG/ML — SIGNIFICANT CHANGE UP (ref 10–20)
WBC # BLD: 19.75 K/UL — HIGH (ref 3.8–10.5)
WBC # FLD AUTO: 19.75 K/UL — HIGH (ref 3.8–10.5)

## 2022-03-16 PROCEDURE — 99233 SBSQ HOSP IP/OBS HIGH 50: CPT

## 2022-03-16 PROCEDURE — 76942 ECHO GUIDE FOR BIOPSY: CPT | Mod: 26

## 2022-03-16 PROCEDURE — 99232 SBSQ HOSP IP/OBS MODERATE 35: CPT

## 2022-03-16 PROCEDURE — 74177 CT ABD & PELVIS W/CONTRAST: CPT | Mod: 26

## 2022-03-16 PROCEDURE — 71260 CT THORAX DX C+: CPT | Mod: 26

## 2022-03-16 PROCEDURE — 51102 DRAIN BL W/CATH INSERTION: CPT

## 2022-03-16 RX ORDER — OXYCODONE HYDROCHLORIDE 5 MG/1
5 TABLET ORAL EVERY 4 HOURS
Refills: 0 | Status: DISCONTINUED | OUTPATIENT
Start: 2022-03-16 | End: 2022-03-24

## 2022-03-16 RX ORDER — VANCOMYCIN HCL 1 G
1250 VIAL (EA) INTRAVENOUS EVERY 12 HOURS
Refills: 0 | Status: COMPLETED | OUTPATIENT
Start: 2022-03-16 | End: 2022-03-23

## 2022-03-16 RX ORDER — OXYCODONE HYDROCHLORIDE 5 MG/1
10 TABLET ORAL EVERY 4 HOURS
Refills: 0 | Status: DISCONTINUED | OUTPATIENT
Start: 2022-03-16 | End: 2022-03-23

## 2022-03-16 RX ADMIN — Medication 650 MILLIGRAM(S): at 06:26

## 2022-03-16 RX ADMIN — Medication 650 MILLIGRAM(S): at 23:26

## 2022-03-16 RX ADMIN — CHLORHEXIDINE GLUCONATE 1 APPLICATION(S): 213 SOLUTION TOPICAL at 13:23

## 2022-03-16 RX ADMIN — Medication 650 MILLIGRAM(S): at 09:33

## 2022-03-16 RX ADMIN — Medication 650 MILLIGRAM(S): at 00:29

## 2022-03-16 RX ADMIN — OXYCODONE HYDROCHLORIDE 10 MILLIGRAM(S): 5 TABLET ORAL at 02:24

## 2022-03-16 RX ADMIN — Medication 3 MILLILITER(S): at 09:10

## 2022-03-16 RX ADMIN — Medication 650 MILLIGRAM(S): at 14:30

## 2022-03-16 RX ADMIN — Medication 650 MILLIGRAM(S): at 13:23

## 2022-03-16 RX ADMIN — OXYCODONE HYDROCHLORIDE 10 MILLIGRAM(S): 5 TABLET ORAL at 23:30

## 2022-03-16 RX ADMIN — Medication 650 MILLIGRAM(S): at 05:56

## 2022-03-16 RX ADMIN — LOSARTAN POTASSIUM 25 MILLIGRAM(S): 100 TABLET, FILM COATED ORAL at 05:56

## 2022-03-16 RX ADMIN — OXYCODONE HYDROCHLORIDE 10 MILLIGRAM(S): 5 TABLET ORAL at 17:44

## 2022-03-16 RX ADMIN — OXYCODONE HYDROCHLORIDE 10 MILLIGRAM(S): 5 TABLET ORAL at 18:14

## 2022-03-16 RX ADMIN — Medication 1 APPLICATION(S): at 05:56

## 2022-03-16 RX ADMIN — MEROPENEM 100 MILLIGRAM(S): 1 INJECTION INTRAVENOUS at 05:57

## 2022-03-16 RX ADMIN — OXYCODONE HYDROCHLORIDE 10 MILLIGRAM(S): 5 TABLET ORAL at 08:33

## 2022-03-16 RX ADMIN — Medication 1 APPLICATION(S): at 13:19

## 2022-03-16 RX ADMIN — TAMSULOSIN HYDROCHLORIDE 0.4 MILLIGRAM(S): 0.4 CAPSULE ORAL at 22:40

## 2022-03-16 RX ADMIN — HEPARIN SODIUM 7500 UNIT(S): 5000 INJECTION INTRAVENOUS; SUBCUTANEOUS at 05:56

## 2022-03-16 RX ADMIN — Medication 650 MILLIGRAM(S): at 08:33

## 2022-03-16 RX ADMIN — Medication 3 MILLILITER(S): at 20:44

## 2022-03-16 RX ADMIN — OXYCODONE HYDROCHLORIDE 10 MILLIGRAM(S): 5 TABLET ORAL at 23:27

## 2022-03-16 RX ADMIN — OXYCODONE HYDROCHLORIDE 10 MILLIGRAM(S): 5 TABLET ORAL at 13:22

## 2022-03-16 RX ADMIN — HEPARIN SODIUM 7500 UNIT(S): 5000 INJECTION INTRAVENOUS; SUBCUTANEOUS at 22:40

## 2022-03-16 RX ADMIN — OXYCODONE HYDROCHLORIDE 10 MILLIGRAM(S): 5 TABLET ORAL at 02:54

## 2022-03-16 RX ADMIN — Medication 3 MILLILITER(S): at 04:04

## 2022-03-16 RX ADMIN — OXYCODONE HYDROCHLORIDE 10 MILLIGRAM(S): 5 TABLET ORAL at 09:30

## 2022-03-16 RX ADMIN — MEROPENEM 100 MILLIGRAM(S): 1 INJECTION INTRAVENOUS at 22:40

## 2022-03-16 RX ADMIN — Medication 1 APPLICATION(S): at 13:22

## 2022-03-16 RX ADMIN — MEROPENEM 100 MILLIGRAM(S): 1 INJECTION INTRAVENOUS at 13:20

## 2022-03-16 RX ADMIN — SODIUM CHLORIDE 75 MILLILITER(S): 9 INJECTION, SOLUTION INTRAVENOUS at 23:26

## 2022-03-16 RX ADMIN — Medication 650 MILLIGRAM(S): at 17:45

## 2022-03-16 RX ADMIN — OXYCODONE HYDROCHLORIDE 10 MILLIGRAM(S): 5 TABLET ORAL at 14:02

## 2022-03-16 RX ADMIN — Medication 1 APPLICATION(S): at 23:37

## 2022-03-16 RX ADMIN — Medication 650 MILLIGRAM(S): at 18:15

## 2022-03-16 RX ADMIN — Medication 250 MILLIGRAM(S): at 06:54

## 2022-03-16 RX ADMIN — MONTELUKAST 10 MILLIGRAM(S): 4 TABLET, CHEWABLE ORAL at 13:23

## 2022-03-16 RX ADMIN — Medication 166.67 MILLIGRAM(S): at 17:45

## 2022-03-16 RX ADMIN — SIMVASTATIN 40 MILLIGRAM(S): 20 TABLET, FILM COATED ORAL at 22:40

## 2022-03-16 NOTE — PROGRESS NOTE ADULT - SUBJECTIVE AND OBJECTIVE BOX
HENRIQUE AMAYA    [id number]    66y      Male    Patient is a 66y old  Male who presents with a chief complaint of scrotal abscess (16 Mar 2022 09:21)      INTERVAL HPI/OVERNIGHT EVENTS:      patient is doing ok, his pain in the genital region is well controlled, denies fever, chills, chest pain.     REVIEW OF SYSTEMS:    CONSTITUTIONAL: No fever, fatigue  RESPIRATORY: No cough, No shortness of breath  CARDIOVASCULAR: No chest pain, palpitations  GASTROINTESTINAL: No abdominal, No nausea, vomiting  NEUROLOGICAL: No headaches,  loss of strength.  MISCELLANEOUS: Pain in the scrotal area, better with pain meds        Vital Signs Last 24 Hrs  T(C): 38.1 (16 Mar 2022 07:43), Max: 38.1 (16 Mar 2022 07:43)  T(F): 100.6 (16 Mar 2022 07:43), Max: 100.6 (16 Mar 2022 07:43)  HR: 100 (16 Mar 2022 09:47) (95 - 111)  BP: 116/71 (16 Mar 2022 07:43) (116/71 - 167/67)  BP(mean): --  RR: 18 (16 Mar 2022 07:00) (17 - 18)  SpO2: 95% (16 Mar 2022 09:47) (85% - 95%)    PHYSICAL EXAM:    GENERAL: Middle age Obese male looking comfortable sitting on the chair   HEENT: PERRL, +EOMI  NECK: soft, Supple, No JVD,   CHEST/LUNG: Clear to auscultate bilaterally; No wheezing  HEART: S1S2+, Regular rate and rhythm; No murmurs  ABDOMEN: Soft, Nontender, Nondistended; Bowel sounds present  EXTREMITIES:  2+ Peripheral Pulses, No edema  SKIN: No rashes or lesions  NEURO: AAOX3, no focal deficits, no motor r sensory loss  PSYCH: normal mood  Genital exam: Swelling and tenderness of the penis, wheeler's in place      LABS:                        9.5    19.75 )-----------( 421      ( 16 Mar 2022 05:57 )             29.0     03-16    140  |  103  |  12.8  ----------------------------<  129<H>  4.7   |  27.0  |  0.86    Ca    8.8      16 Mar 2022 05:57  Phos  2.9     03-15  Mg     1.8     03-15              I&O's Summary    15 Mar 2022 07:01  -  16 Mar 2022 07:00  --------------------------------------------------------  IN: 240 mL / OUT: 2000 mL / NET: -1760 mL    16 Mar 2022 07:01  -  16 Mar 2022 14:30  --------------------------------------------------------  IN: 0 mL / OUT: 800 mL / NET: -800 mL        MEDICATIONS  (STANDING):  acetaminophen     Tablet .. 650 milliGRAM(s) Oral every 6 hours  albuterol/ipratropium for Nebulization 3 milliLiter(s) Nebulizer every 6 hours  BACItracin   Ointment 1 Application(s) Topical every 6 hours  chlorhexidine 2% Cloths 1 Application(s) Topical daily  heparin   Injectable 7500 Unit(s) SubCutaneous every 8 hours  influenza  Vaccine (HIGH DOSE) 0.7 milliLiter(s) IntraMuscular once  lactated ringers. 1000 milliLiter(s) (75 mL/Hr) IV Continuous <Continuous>  losartan 25 milliGRAM(s) Oral daily  meropenem  IVPB 1000 milliGRAM(s) IV Intermittent every 8 hours  montelukast 10 milliGRAM(s) Oral daily  polyethylene glycol 3350 17 Gram(s) Oral daily  senna 2 Tablet(s) Oral at bedtime  silver sulfADIAZINE 1% Cream 1 Application(s) Topical daily  simvastatin 40 milliGRAM(s) Oral at bedtime  tamsulosin 0.4 milliGRAM(s) Oral at bedtime  vancomycin  IVPB 1000 milliGRAM(s) IV Intermittent every 12 hours    MEDICATIONS  (PRN):  acetaminophen     Tablet .. 650 milliGRAM(s) Oral every 6 hours PRN Temp greater or equal to 38C (100.4F), Mild Pain (1 - 3)  oxyCODONE    IR 5 milliGRAM(s) Oral every 4 hours PRN Moderate Pain (4 - 6)  oxyCODONE    IR 10 milliGRAM(s) Oral every 4 hours PRN Severe Pain (7 - 10)

## 2022-03-16 NOTE — PROGRESS NOTE ADULT - SUBJECTIVE AND OBJECTIVE BOX
IR Post Procedure Note    Diagnosis: Peters Injury    Procedure: SPC placement    : Mac Rojo MD    Contrast:  10 cc    Anesthesia: 1% Lidocaine Subcutaneous, 100mcg fentnayl    Estimated Blood Loss: Less than 10cc    Specimens: Identified, Labeled, Confirmed and Sent to Lab.    Complications: No Immediate Complications    Anticoagulation: Resume in 24 Hours    Findings & Plan: 14Fr SPCplaced in bladder under US guidance and secured to skin w sutures. Connected to gravity drainage      Please call Interventional Radiology with any questions, concerns, or issues.

## 2022-03-16 NOTE — PROGRESS NOTE ADULT - ASSESSMENT
Patient is a 67 y/o male s/p right orchiectomy on 3/1, admitted 3/8/22     scrotal abscess. Now s/p I&D of scrotal absces 3/10   AND PLACED ON IV ABX   PT  WITH I  LEUKOCYTOSIS   PT WITH SERRATIA /MRSA AND BACTEROIDES   CHANGED  MERREM/ VANCO FOR  BROADER COVERAGE   WBC STILL ELEVATED  HAD LOW GRADE TEMP  AGREE WITH CT CHEST ABD PELVIS TO LOOK FOR SOURCE  IF SPIKES AGAIN SUGGEST  REPEAT BLOOD CX  SUGGEST HIV TEST   CONTINUE CURRENT ABX  SPOKE TO SISTER AT BEDSIDE   WILL FOLLOWUP  WILL D/W

## 2022-03-16 NOTE — PROGRESS NOTE ADULT - SUBJECTIVE AND OBJECTIVE BOX
Subjective:66yMale s/p recent right orchiectomy, admitted with sepsis, scrotal abscess, POD#7 s/p I&D scrotal abscess.  Pt has intermittent c/o pain in scrotum and penis. No chest pain, no SOB, no abdominal pain, +BM this am.  Pt has had persistent leukocytosis, now has low grade fever this am, 100.6.  pt not keeping penis and scrotum elevated.    Peters: yellow    Vital Signs Last 24 Hrs  T(C): 38.1 (16 Mar 2022 07:43), Max: 38.1 (16 Mar 2022 07:43)  T(F): 100.6 (16 Mar 2022 07:43), Max: 100.6 (16 Mar 2022 07:43)  HR: 102 (16 Mar 2022 07:43) (95 - 111)  BP: 116/71 (16 Mar 2022 07:43) (116/71 - 167/67)  BP(mean): --  RR: 18 (16 Mar 2022 07:00) (17 - 18)  SpO2: 91% (16 Mar 2022 07:43) (85% - 94%)  I&O's Detail    15 Mar 2022 07:01  -  16 Mar 2022 07:00  --------------------------------------------------------  IN:    Oral Fluid: 240 mL  Total IN: 240 mL    OUT:    Indwelling Catheter - Urethral (mL): 900 mL    Voided (mL): 1100 mL  Total OUT: 2000 mL    Total NET: -1760 mL          Labs:                        9.5    19.75 )-----------( 421      ( 16 Mar 2022 05:57 )             29.0     03-16    140  |  103  |  12.8  ----------------------------<  129<H>  4.7   |  27.0  |  0.86    Ca    8.8      16 Mar 2022 05:57  Phos  2.9     03-15  Mg     1.8     03-15

## 2022-03-16 NOTE — PROGRESS NOTE ADULT - ASSESSMENT
67 y/o M with hx of HTN, HLD, prostate CA (S/P radiation therapy in 2013), COPD, Active smoker, Epididymo-orchitis, he was having right scrotal pain for 5 months, he noticed an abscess on the right scrotum. States he has been on and off abx with minimal relief, he had US doppler of scrotum on 1/21/22 which found Complex 1.6 cm sized right-sided intratesticular and an adjacent 1 cm sized probably extratesticular lesion concerning for intratesticular and extratesticular abscesses and associated large hydrocele, he had an MRI 02/03/2022 [facility]: two right side extratesticular abscesses, likely in the epididymal tail 1.6 + 1 cm. also a large right hydrocele, he is came in for elective scrotal exploration, right hydrocelectomy, right drainage or abscess and possible orchiectomy with Dr. Damián Parekh on 3/1/2022, he is s/p Right orchiectomy on 03/01, he discharge afterwards, he came back again on 03/08 with worsening swelling and pain, in the ER he was noted to be hypotensive,  Labs reveal leukocytosis, SAVANAN and lactic admitted in Surgical ICU for sepsis due to scrotal abscess, his cultures obtained and he was startec on IV antibiotics, his lactate improved with IV fluids, he had CT of abdomen and Pelvis done that showed Extensive subcutaneous edema in the penis and scrotum with a small amount of air in the right scrotum which is likely postsurgical.   Infectious etiology not be completely excluded, Left testis cannot clearly be seen on this study, he was seen by Urology, they took him ot the OR and did I&D, he was monitored in the ICU, later on his sepsis improved and then transferred under Urology service, he was on Vanc, metro and cefepime, his blood cultures came negative, his abscess cultures came positive SERRATIA /MRSA AND BACTEROIDES, ID saw the patient and changed antibiotics to Meropenum and  Vancomycin, he has wheeler's catheter, patient is feeling better, his swelling is improving, denies fever, chills, chest pain, sob, he has intermittent cough, medicine was consulted for comanagement.     Plan:     Scrotal Abscess s/p I&D:   his blood cultures came negative, his abscess cultures came positive SERRATIA /MRSA AND BACTEROIDES, ID saw the patient and changed antibiotics to Meropenum and  Vancomycin, he has wheeler's catheter,   pain meds as per primary team   wound care  Scrotal support  White count is up  will follow along with ID and Urology.  Going to have repeat imaging.     Sepsis POA: resolved now.     HTN: patient was on hydrochlorothiazide 25 mg once a day and losartan 100 mg once a day, has been started on Losartan 25mg daily with holding parameters, BP is better, will continue to monitor    HLD: Will continue with Simvastatin 40 mg once a day (at bedtime).     Hx of COPD: montelukast 10 mg once a day, duo nebs as needed    Hx of Prostate cancer: Tamsulosin 0.4 mg once a day    Hx of smoking: Nicotine patches offered, counselled for cessation.     DVT prophylaxis: Heparin SC.

## 2022-03-16 NOTE — PROGRESS NOTE ADULT - ASSESSMENT
67 yo male POD# 7 s/p I&D scrotal abscess, persistent leukocytosis, low grade fevers  - scrotal dressing changed, silvadene to eschar  - pt needs SPT , wheeler needs to be removed after  - will consult IR for SPT  - CT chest/abd/pelvis today  - HIV test ordered, pt agrees to screening  - cont abx- merem/vanco  - NPO for CT and poss SPT  - am labs  - discussed plan with pt, he understands and agrees

## 2022-03-16 NOTE — PROGRESS NOTE ADULT - SUBJECTIVE AND OBJECTIVE BOX
INFECTIOUS DISEASES AND INTERNAL MEDICINE at [place]  =======================================================  Eliud Jarvis MD  Diplomates [employer] Board of Internal Medicine and Infectious Diseases  Telephone [phone]  Fax            [phone]  =======================================================    HENRIQUE AMAYA [id number]    Follow up: LEUKOCYTOSIS SCROTAL INFECTION     Allergies:  penicillins (Unknown)      Medications:  acetaminophen     Tablet .. 650 milliGRAM(s) Oral every 6 hours PRN  acetaminophen     Tablet .. 650 milliGRAM(s) Oral every 6 hours  albuterol/ipratropium for Nebulization 3 milliLiter(s) Nebulizer every 6 hours  BACItracin   Ointment 1 Application(s) Topical every 6 hours  chlorhexidine 2% Cloths 1 Application(s) Topical daily  heparin   Injectable 7500 Unit(s) SubCutaneous every 8 hours  influenza  Vaccine (HIGH DOSE) 0.7 milliLiter(s) IntraMuscular once  lactated ringers. 1000 milliLiter(s) IV Continuous <Continuous>  losartan 25 milliGRAM(s) Oral daily  meropenem  IVPB 1000 milliGRAM(s) IV Intermittent every 8 hours  montelukast 10 milliGRAM(s) Oral daily  oxyCODONE    IR 5 milliGRAM(s) Oral every 4 hours PRN  oxyCODONE    IR 10 milliGRAM(s) Oral every 4 hours PRN  polyethylene glycol 3350 17 Gram(s) Oral daily  senna 2 Tablet(s) Oral at bedtime  silver sulfADIAZINE 1% Cream 1 Application(s) Topical daily  simvastatin 40 milliGRAM(s) Oral at bedtime  tamsulosin 0.4 milliGRAM(s) Oral at bedtime  vancomycin  IVPB 1000 milliGRAM(s) IV Intermittent every 12 hours    SOCIAL       FAMILY   FAMILY HISTORY:  Family history not known due to adoption (Father, Mother)      REVIEW OF SYSTEMS:  CONSTITUTIONAL:  No Fever or chills  HEENT:   No diplopia or blurred vision.  No earache, sore throat or runny nose.  CARDIOVASCULAR:  No pressure, squeezing, strangling, tightness, heaviness or aching about the chest, neck, axilla or epigastrium.  RESPIRATORY:    cough, shortness of breath,   GASTROINTESTINAL:  No nausea, vomiting or diarrhea.  GENITOURINARY:  AS PER HPI   MUSCULOSKELETAL:   moves all joints  SKIN:  No change in skin, hair or nails.  NEUROLOGIC:  No paresthesias, fasciculations, seizures or weakness.  PSYCHIATRIC:  No disorder of thought or mood.  ENDOCRINE:  No heat or cold intolerance, polyuria or polydipsia.  HEMATOLOGICAL:  No easy bruising or bleeding.            Physical Exam:  ICU Vital Signs Last 24 Hrs  T(C): 38.1 (16 Mar 2022 07:43), Max: 38.1 (16 Mar 2022 07:43)  T(F): 100.6 (16 Mar 2022 07:43), Max: 100.6 (16 Mar 2022 07:43)  HR: 102 (16 Mar 2022 07:43) (95 - 111)  BP: 116/71 (16 Mar 2022 07:43) (116/71 - 167/67)  BP(mean): --  ABP: --  ABP(mean): --  RR: 18 (16 Mar 2022 07:00) (17 - 18)  SpO2: 91% (16 Mar 2022 07:43) (85% - 94%)    GEN: NAD,   HEENT: normocephalic and atraumatic. EOMI. JADE.    NECK: Supple. No carotid bruits.  No lymphadenopathy or thyromegaly.  LUNGS: Clear to auscultation.  HEART: Regular rate and rhythm without murmur.  ABDOMEN: Soft, nontender, and nondistended.  Positive bowel sounds.    :  PENILE EDEMA AND ERYTHEMA  SCROTAL WOUND DRESSED  EXTREMITIES: Without any cyanosis, clubbing, rash, lesions or edema.  MSK: no joint swelling  NEUROLOGIC: Cranial nerves II through XII are grossly intact.  PSYCHIATRIC: Appropriate affect .  SKIN: No ulceration or induration present.        Labs:  Vitals:  ============  T(F): 100.6 (16 Mar 2022 07:43), Max: 100.6 (16 Mar 2022 07:43)  HR: 102 (16 Mar 2022 07:43)  BP: 116/71 (16 Mar 2022 07:43)  RR: 18 (16 Mar 2022 07:00)  SpO2: 91% (16 Mar 2022 07:43) (85% - 94%)  temp max in last 48H T(F): , Max: 100.6 (03-16-22 @ 07:43)    =======================================================  Current Antibiotics:  meropenem  IVPB 1000 milliGRAM(s) IV Intermittent every 8 hours  vancomycin  IVPB 1000 milliGRAM(s) IV Intermittent every 12 hours    Other medications:  acetaminophen     Tablet .. 650 milliGRAM(s) Oral every 6 hours  albuterol/ipratropium for Nebulization 3 milliLiter(s) Nebulizer every 6 hours  BACItracin   Ointment 1 Application(s) Topical every 6 hours  chlorhexidine 2% Cloths 1 Application(s) Topical daily  heparin   Injectable 7500 Unit(s) SubCutaneous every 8 hours  influenza  Vaccine (HIGH DOSE) 0.7 milliLiter(s) IntraMuscular once  lactated ringers. 1000 milliLiter(s) IV Continuous <Continuous>  losartan 25 milliGRAM(s) Oral daily  montelukast 10 milliGRAM(s) Oral daily  polyethylene glycol 3350 17 Gram(s) Oral daily  senna 2 Tablet(s) Oral at bedtime  silver sulfADIAZINE 1% Cream 1 Application(s) Topical daily  simvastatin 40 milliGRAM(s) Oral at bedtime  tamsulosin 0.4 milliGRAM(s) Oral at bedtime      =======================================================  Labs:                        9.5    19.75 )-----------( 421      ( 16 Mar 2022 05:57 )             29.0     03-16    140  |  103  |  12.8  ----------------------------<  129<H>  4.7   |  27.0  |  0.86    Ca    8.8      16 Mar 2022 05:57  Phos  2.9     03-15  Mg     1.8     03-15        Culture - Fungal, Other (collected 03-09-22 @ 21:49)  Source: .Other scrotal abscess    Culture - Abscess with Gram Stain (collected 03-09-22 @ 21:48)  Source: .Abscess scrotal abscess  Final Report (03-11-22 @ 21:20):    Moderate Methicillin Resistant Staphylococcus aureus    Rare Serratia marcescens    Few Bacteroides fragilis "Susceptibilities not performed"  Organism: Methicillin resistant Staphylococcus aureus  Serratia marcescens (03-11-22 @ 21:20)  Organism: Serratia marcescens (03-11-22 @ 21:20)    Sensitivities:      -  Amikacin: S <=16      -  Amoxicillin/Clavulanic Acid: R >16/8      -  Ampicillin: R 16 These ampicillin results predict results for amoxicillin      -  Ampicillin/Sulbactam: R 8/4 Enterobacter, Klebsiella aerogenes, Citrobacter, and Serratia may develop resistance during prolonged therapy (3-4 days)      -  Aztreonam: S <=4      -  Cefazolin: R >16 Enterobacter, Klebsiella aerogenes, Citrobacter, and Serratia may develop resistance during prolonged therapy (3-4 days)      -  Cefepime: S <=2      -  Cefoxitin: R <=8      -  Ceftriaxone: S <=1 Enterobacter, Klebsiella aerogenes, Citrobacter, and Serratia may develop resistance during prolonged therapy      -  Ciprofloxacin: R >2      -  Ertapenem: S <=0.5      -  Gentamicin: S <=2      -  Levofloxacin: R >4      -  Meropenem: S <=1      -  Piperacillin/Tazobactam: S <=8      -  Tobramycin: S <=2      -  Trimethoprim/Sulfamethoxazole: S 1/19      Method Type: RYAN  Organism: Methicillin resistant Staphylococcus aureus (03-11-22 @ 21:20)    Sensitivities:      -  Ampicillin/Sulbactam: R <=8/4      -  Cefazolin: R <=4      -  Clindamycin: R >4      -  Daptomycin: S 1      -  Erythromycin: R >4      -  Gentamicin: S <=1 Should not be used as monotherapy      -  Linezolid: S 2      -  Oxacillin: R >2      -  Penicillin: R >8      -  Rifampin: S <=1 Should not be used as monotherapy      -  Tetra/Doxy: S 2      -  Trimethoprim/Sulfamethoxazole: S <=0.5/9.5      -  Vancomycin: S 2      Method Type: RYAN    Culture - Urine (collected 03-08-22 @ 22:16)  Source: Clean Catch Clean Catch (Midstream)  Final Report (03-09-22 @ 18:06):    No growth    Culture - Blood (collected 03-08-22 @ 12:28)  Source: .Blood Blood-Peripheral  Final Report (03-13-22 @ 13:01):    No growth at 5 days.    Culture - Blood (collected 03-08-22 @ 12:27)  Source: .Blood Blood-Peripheral  Final Report (03-13-22 @ 13:00):    No growth at 5 days.      Creatinine, Serum: 0.86 mg/dL (03-16-22 @ 05:57)  Creatinine, Serum: 0.83 mg/dL (03-15-22 @ 05:52)  Creatinine, Serum: 0.90 mg/dL (03-14-22 @ 05:47)  Creatinine, Serum: 0.91 mg/dL (03-13-22 @ 11:29)  Creatinine, Serum: 1.04 mg/dL (03-12-22 @ 07:54)    Procalcitonin, Serum: 0.63 ng/mL (03-14-22 @ 05:47)          WBC Count: 19.75 K/uL (03-16-22 @ 05:57)  WBC Count: 19.47 K/uL (03-15-22 @ 05:52)  WBC Count: 20.38 K/uL (03-14-22 @ 05:47)  WBC Count: 15.12 K/uL (03-13-22 @ 11:29)  WBC Count: 10.89 K/uL (03-12-22 @ 07:54)    COVID-19 PCR: NotDetec (03-14-22 @ 06:45)  SARS-CoV-2 Result: NotDetec (03-08-22 @ 12:34)

## 2022-03-17 LAB
ALBUMIN SERPL ELPH-MCNC: 2.6 G/DL — LOW (ref 3.3–5.2)
ALP SERPL-CCNC: 143 U/L — HIGH (ref 40–120)
ALT FLD-CCNC: 13 U/L — SIGNIFICANT CHANGE UP
ANION GAP SERPL CALC-SCNC: 7 MMOL/L — SIGNIFICANT CHANGE UP (ref 5–17)
AST SERPL-CCNC: 12 U/L — SIGNIFICANT CHANGE UP
BASOPHILS # BLD AUTO: 0.04 K/UL — SIGNIFICANT CHANGE UP (ref 0–0.2)
BASOPHILS NFR BLD AUTO: 0.2 % — SIGNIFICANT CHANGE UP (ref 0–2)
BILIRUB SERPL-MCNC: 0.5 MG/DL — SIGNIFICANT CHANGE UP (ref 0.4–2)
BUN SERPL-MCNC: 13.6 MG/DL — SIGNIFICANT CHANGE UP (ref 8–20)
CALCIUM SERPL-MCNC: 8.7 MG/DL — SIGNIFICANT CHANGE UP (ref 8.6–10.2)
CHLORIDE SERPL-SCNC: 99 MMOL/L — SIGNIFICANT CHANGE UP (ref 98–107)
CO2 SERPL-SCNC: 30 MMOL/L — HIGH (ref 22–29)
CREAT SERPL-MCNC: 0.78 MG/DL — SIGNIFICANT CHANGE UP (ref 0.5–1.3)
EGFR: 97 ML/MIN/1.73M2 — SIGNIFICANT CHANGE UP
EOSINOPHIL # BLD AUTO: 0.17 K/UL — SIGNIFICANT CHANGE UP (ref 0–0.5)
EOSINOPHIL NFR BLD AUTO: 1 % — SIGNIFICANT CHANGE UP (ref 0–6)
GLUCOSE SERPL-MCNC: 111 MG/DL — HIGH (ref 70–99)
HCT VFR BLD CALC: 30.2 % — LOW (ref 39–50)
HGB BLD-MCNC: 9.6 G/DL — LOW (ref 13–17)
HIV 1 & 2 AB SERPL IA.RAPID: SIGNIFICANT CHANGE UP
IMM GRANULOCYTES NFR BLD AUTO: 1.2 % — SIGNIFICANT CHANGE UP (ref 0–1.5)
LYMPHOCYTES # BLD AUTO: 16.1 % — SIGNIFICANT CHANGE UP (ref 13–44)
LYMPHOCYTES # BLD AUTO: 2.62 K/UL — SIGNIFICANT CHANGE UP (ref 1–3.3)
MAGNESIUM SERPL-MCNC: 1.9 MG/DL — SIGNIFICANT CHANGE UP (ref 1.6–2.6)
MCHC RBC-ENTMCNC: 31.8 GM/DL — LOW (ref 32–36)
MCHC RBC-ENTMCNC: 32.9 PG — SIGNIFICANT CHANGE UP (ref 27–34)
MCV RBC AUTO: 103.4 FL — HIGH (ref 80–100)
MONOCYTES # BLD AUTO: 0.65 K/UL — SIGNIFICANT CHANGE UP (ref 0–0.9)
MONOCYTES NFR BLD AUTO: 4 % — SIGNIFICANT CHANGE UP (ref 2–14)
NEUTROPHILS # BLD AUTO: 12.64 K/UL — HIGH (ref 1.8–7.4)
NEUTROPHILS NFR BLD AUTO: 77.5 % — HIGH (ref 43–77)
PHOSPHATE SERPL-MCNC: 2.9 MG/DL — SIGNIFICANT CHANGE UP (ref 2.4–4.7)
PLATELET # BLD AUTO: 487 K/UL — HIGH (ref 150–400)
POTASSIUM SERPL-MCNC: 4.8 MMOL/L — SIGNIFICANT CHANGE UP (ref 3.5–5.3)
POTASSIUM SERPL-SCNC: 4.8 MMOL/L — SIGNIFICANT CHANGE UP (ref 3.5–5.3)
PROT SERPL-MCNC: 6.9 G/DL — SIGNIFICANT CHANGE UP (ref 6.6–8.7)
RBC # BLD: 2.92 M/UL — LOW (ref 4.2–5.8)
RBC # FLD: 15.1 % — HIGH (ref 10.3–14.5)
SODIUM SERPL-SCNC: 136 MMOL/L — SIGNIFICANT CHANGE UP (ref 135–145)
WBC # BLD: 16.32 K/UL — HIGH (ref 3.8–10.5)
WBC # FLD AUTO: 16.32 K/UL — HIGH (ref 3.8–10.5)

## 2022-03-17 PROCEDURE — 99232 SBSQ HOSP IP/OBS MODERATE 35: CPT

## 2022-03-17 RX ORDER — LOSARTAN POTASSIUM 100 MG/1
50 TABLET, FILM COATED ORAL DAILY
Refills: 0 | Status: DISCONTINUED | OUTPATIENT
Start: 2022-03-17 | End: 2022-03-24

## 2022-03-17 RX ADMIN — Medication 3 MILLILITER(S): at 20:56

## 2022-03-17 RX ADMIN — LOSARTAN POTASSIUM 50 MILLIGRAM(S): 100 TABLET, FILM COATED ORAL at 12:23

## 2022-03-17 RX ADMIN — Medication 1 APPLICATION(S): at 23:00

## 2022-03-17 RX ADMIN — OXYCODONE HYDROCHLORIDE 10 MILLIGRAM(S): 5 TABLET ORAL at 12:52

## 2022-03-17 RX ADMIN — MEROPENEM 100 MILLIGRAM(S): 1 INJECTION INTRAVENOUS at 21:58

## 2022-03-17 RX ADMIN — Medication 1 APPLICATION(S): at 17:11

## 2022-03-17 RX ADMIN — SIMVASTATIN 40 MILLIGRAM(S): 20 TABLET, FILM COATED ORAL at 21:59

## 2022-03-17 RX ADMIN — OXYCODONE HYDROCHLORIDE 10 MILLIGRAM(S): 5 TABLET ORAL at 08:45

## 2022-03-17 RX ADMIN — Medication 166.67 MILLIGRAM(S): at 17:11

## 2022-03-17 RX ADMIN — OXYCODONE HYDROCHLORIDE 10 MILLIGRAM(S): 5 TABLET ORAL at 07:49

## 2022-03-17 RX ADMIN — Medication 1 APPLICATION(S): at 06:23

## 2022-03-17 RX ADMIN — Medication 1 APPLICATION(S): at 12:23

## 2022-03-17 RX ADMIN — MONTELUKAST 10 MILLIGRAM(S): 4 TABLET, CHEWABLE ORAL at 12:23

## 2022-03-17 RX ADMIN — MEROPENEM 100 MILLIGRAM(S): 1 INJECTION INTRAVENOUS at 06:22

## 2022-03-17 RX ADMIN — TAMSULOSIN HYDROCHLORIDE 0.4 MILLIGRAM(S): 0.4 CAPSULE ORAL at 21:58

## 2022-03-17 RX ADMIN — Medication 3 MILLILITER(S): at 03:36

## 2022-03-17 RX ADMIN — Medication 650 MILLIGRAM(S): at 12:58

## 2022-03-17 RX ADMIN — HEPARIN SODIUM 7500 UNIT(S): 5000 INJECTION INTRAVENOUS; SUBCUTANEOUS at 06:23

## 2022-03-17 RX ADMIN — Medication 650 MILLIGRAM(S): at 00:00

## 2022-03-17 RX ADMIN — OXYCODONE HYDROCHLORIDE 10 MILLIGRAM(S): 5 TABLET ORAL at 18:30

## 2022-03-17 RX ADMIN — SENNA PLUS 2 TABLET(S): 8.6 TABLET ORAL at 21:58

## 2022-03-17 RX ADMIN — Medication 650 MILLIGRAM(S): at 06:23

## 2022-03-17 RX ADMIN — Medication 3 MILLILITER(S): at 09:08

## 2022-03-17 RX ADMIN — MEROPENEM 100 MILLIGRAM(S): 1 INJECTION INTRAVENOUS at 13:49

## 2022-03-17 RX ADMIN — Medication 650 MILLIGRAM(S): at 06:53

## 2022-03-17 RX ADMIN — OXYCODONE HYDROCHLORIDE 10 MILLIGRAM(S): 5 TABLET ORAL at 23:30

## 2022-03-17 RX ADMIN — OXYCODONE HYDROCHLORIDE 10 MILLIGRAM(S): 5 TABLET ORAL at 18:07

## 2022-03-17 RX ADMIN — HEPARIN SODIUM 7500 UNIT(S): 5000 INJECTION INTRAVENOUS; SUBCUTANEOUS at 21:58

## 2022-03-17 RX ADMIN — Medication 650 MILLIGRAM(S): at 15:41

## 2022-03-17 RX ADMIN — HEPARIN SODIUM 7500 UNIT(S): 5000 INJECTION INTRAVENOUS; SUBCUTANEOUS at 13:49

## 2022-03-17 RX ADMIN — LOSARTAN POTASSIUM 25 MILLIGRAM(S): 100 TABLET, FILM COATED ORAL at 06:23

## 2022-03-17 RX ADMIN — Medication 650 MILLIGRAM(S): at 12:23

## 2022-03-17 RX ADMIN — Medication 650 MILLIGRAM(S): at 17:11

## 2022-03-17 RX ADMIN — Medication 650 MILLIGRAM(S): at 23:30

## 2022-03-17 RX ADMIN — OXYCODONE HYDROCHLORIDE 10 MILLIGRAM(S): 5 TABLET ORAL at 23:00

## 2022-03-17 RX ADMIN — CHLORHEXIDINE GLUCONATE 1 APPLICATION(S): 213 SOLUTION TOPICAL at 12:24

## 2022-03-17 RX ADMIN — Medication 650 MILLIGRAM(S): at 23:00

## 2022-03-17 RX ADMIN — Medication 1 APPLICATION(S): at 12:24

## 2022-03-17 RX ADMIN — Medication 166.67 MILLIGRAM(S): at 06:23

## 2022-03-17 RX ADMIN — OXYCODONE HYDROCHLORIDE 10 MILLIGRAM(S): 5 TABLET ORAL at 12:22

## 2022-03-17 NOTE — PROGRESS NOTE ADULT - SUBJECTIVE AND OBJECTIVE BOX
INFECTIOUS DISEASES AND INTERNAL MEDICINE at [place]  =======================================================  Eliud Jarvis MD  Diplomates American Board of Internal Medicine and Infectious Diseases  Telephone [phone]  Fax            [phone]  =======================================================    HENRIQUE AMAYA [id number]    Follow up: LEUKOCYTOSIS SCROTAL INFECTION     Allergies:  penicillins (Unknown)      Medications:  acetaminophen     Tablet .. 650 milliGRAM(s) Oral every 6 hours PRN  acetaminophen     Tablet .. 650 milliGRAM(s) Oral every 6 hours  albuterol/ipratropium for Nebulization 3 milliLiter(s) Nebulizer every 6 hours  BACItracin   Ointment 1 Application(s) Topical every 6 hours  chlorhexidine 2% Cloths 1 Application(s) Topical daily  heparin   Injectable 7500 Unit(s) SubCutaneous every 8 hours  influenza  Vaccine (HIGH DOSE) 0.7 milliLiter(s) IntraMuscular once  lactated ringers. 1000 milliLiter(s) IV Continuous <Continuous>  losartan 25 milliGRAM(s) Oral daily  meropenem  IVPB 1000 milliGRAM(s) IV Intermittent every 8 hours  montelukast 10 milliGRAM(s) Oral daily  oxyCODONE    IR 5 milliGRAM(s) Oral every 4 hours PRN  oxyCODONE    IR 10 milliGRAM(s) Oral every 4 hours PRN  polyethylene glycol 3350 17 Gram(s) Oral daily  senna 2 Tablet(s) Oral at bedtime  silver sulfADIAZINE 1% Cream 1 Application(s) Topical daily  simvastatin 40 milliGRAM(s) Oral at bedtime  tamsulosin 0.4 milliGRAM(s) Oral at bedtime  vancomycin  IVPB 1000 milliGRAM(s) IV Intermittent every 12 hours    SOCIAL       FAMILY   FAMILY HISTORY:  Family history not known due to adoption (Father, Mother)      REVIEW OF SYSTEMS:  CONSTITUTIONAL:  No Fever or chills  HEENT:   No diplopia or blurred vision.  No earache, sore throat or runny nose.  CARDIOVASCULAR:  No pressure, squeezing, strangling, tightness, heaviness or aching about the chest, neck, axilla or epigastrium.  RESPIRATORY:    cough, shortness of breath,   GASTROINTESTINAL:  No nausea, vomiting or diarrhea.  GENITOURINARY:  AS PER HPI   MUSCULOSKELETAL:   moves all joints  SKIN:  No change in skin, hair or nails.  NEUROLOGIC:  No paresthesias, fasciculations, seizures or weakness.  PSYCHIATRIC:  No disorder of thought or mood.  ENDOCRINE:  No heat or cold intolerance, polyuria or polydipsia.  HEMATOLOGICAL:  No easy bruising or bleeding.            Physical Exam:  I Vital Signs Last 24 Hrs  T(C): 37.2 (17 Mar 2022 08:04), Max: 37.4 (16 Mar 2022 19:30)  T(F): 98.9 (17 Mar 2022 08:04), Max: 99.4 (16 Mar 2022 19:30)  HR: 101 (17 Mar 2022 08:04) (98 - 101)  BP: 148/90 (17 Mar 2022 08:04) (134/72 - 148/90)  BP(mean): --  RR: 18 (17 Mar 2022 04:26) (16 - 18)  SpO2: 94% (17 Mar 2022 08:04) (94% - 96%)    GEN: NAD,   HEENT: normocephalic and atraumatic. EOMI. JADE.    NECK: Supple. No carotid bruits.  No lymphadenopathy or thyromegaly.  LUNGS: Clear to auscultation.  HEART: Regular rate and rhythm without murmur.  ABDOMEN: Soft, nontender, and nondistended.  Positive bowel sounds.    :   DECREASED PENILE EDEMA AND ERYTHEMA  SCROTAL WOUND DRESSED SUPRAPUBIC CATHETER IN PLACE  EXTREMITIES: Without any cyanosis, clubbing, rash, lesions or edema.  MSK: no joint swelling  NEUROLOGIC: Cranial nerves II through XII are grossly intact.  PSYCHIATRIC: Appropriate affect .  SKIN: No ulceration or induration present.        Labs:     =======================================================  Current Antibiotics:  meropenem  IVPB 1000 milliGRAM(s) IV Intermittent every 8 hours  vancomycin  IVPB 1000 milliGRAM(s) IV Intermittent every 12 hours    Other medications:  acetaminophen     Tablet .. 650 milliGRAM(s) Oral every 6 hours  albuterol/ipratropium for Nebulization 3 milliLiter(s) Nebulizer every 6 hours  BACItracin   Ointment 1 Application(s) Topical every 6 hours  chlorhexidine 2% Cloths 1 Application(s) Topical daily  heparin   Injectable 7500 Unit(s) SubCutaneous every 8 hours  influenza  Vaccine (HIGH DOSE) 0.7 milliLiter(s) IntraMuscular once  lactated ringers. 1000 milliLiter(s) IV Continuous <Continuous>  losartan 25 milliGRAM(s) Oral daily  montelukast 10 milliGRAM(s) Oral daily  polyethylene glycol 3350 17 Gram(s) Oral daily  senna 2 Tablet(s) Oral at bedtime  silver sulfADIAZINE 1% Cream 1 Application(s) Topical daily  simvastatin 40 milliGRAM(s) Oral at bedtime  tamsulosin 0.4 milliGRAM(s) Oral at bedtime      =======================================================  Labs:                                     9.6    16.32 )-----------( 487      ( 17 Mar 2022 06:42 )             30.2   03-17    136  |  99  |  13.6  ----------------------------<  111<H>  4.8   |  30.0<H>  |  0.78    Ca    8.7      17 Mar 2022 06:42  Phos  2.9     03-17  Mg     1.9     03-17    TPro  6.9  /  Alb  2.6<L>  /  TBili  0.5  /  DBili  x   /  AST  12  /  ALT  13  /  AlkPhos  143<H>  03-17        Culture - Fungal, Other (collected 03-09-22 @ 21:49)  Source: .Other scrotal abscess    Culture - Abscess with Gram Stain (collected 03-09-22 @ 21:48)  Source: .Abscess scrotal abscess  Final Report (03-11-22 @ 21:20):    Moderate Methicillin Resistant Staphylococcus aureus    Rare Serratia marcescens    Few Bacteroides fragilis "Susceptibilities not performed"  Organism: Methicillin resistant Staphylococcus aureus  Serratia marcescens (03-11-22 @ 21:20)  Organism: Serratia marcescens (03-11-22 @ 21:20)    Sensitivities:      -  Amikacin: S <=16      -  Amoxicillin/Clavulanic Acid: R >16/8      -  Ampicillin: R 16 These ampicillin results predict results for amoxicillin      -  Ampicillin/Sulbactam: R 8/4 Enterobacter, Klebsiella aerogenes, Citrobacter, and Serratia may develop resistance during prolonged therapy (3-4 days)      -  Aztreonam: S <=4      -  Cefazolin: R >16 Enterobacter, Klebsiella aerogenes, Citrobacter, and Serratia may develop resistance during prolonged therapy (3-4 days)      -  Cefepime: S <=2      -  Cefoxitin: R <=8      -  Ceftriaxone: S <=1 Enterobacter, Klebsiella aerogenes, Citrobacter, and Serratia may develop resistance during prolonged therapy      -  Ciprofloxacin: R >2      -  Ertapenem: S <=0.5      -  Gentamicin: S <=2      -  Levofloxacin: R >4      -  Meropenem: S <=1      -  Piperacillin/Tazobactam: S <=8      -  Tobramycin: S <=2      -  Trimethoprim/Sulfamethoxazole: S 1/19      Method Type: RYAN  Organism: Methicillin resistant Staphylococcus aureus (03-11-22 @ 21:20)    Sensitivities:      -  Ampicillin/Sulbactam: R <=8/4      -  Cefazolin: R <=4      -  Clindamycin: R >4      -  Daptomycin: S 1      -  Erythromycin: R >4      -  Gentamicin: S <=1 Should not be used as monotherapy      -  Linezolid: S 2      -  Oxacillin: R >2      -  Penicillin: R >8      -  Rifampin: S <=1 Should not be used as monotherapy      -  Tetra/Doxy: S 2      -  Trimethoprim/Sulfamethoxazole: S <=0.5/9.5      -  Vancomycin: S 2      Method Type: RYAN    Culture - Urine (collected 03-08-22 @ 22:16)  Source: Clean Catch Clean Catch (Midstream)  Final Report (03-09-22 @ 18:06):    No growth    Culture - Blood (collected 03-08-22 @ 12:28)  Source: .Blood Blood-Peripheral  Final Report (03-13-22 @ 13:01):    No growth at 5 days.    Culture - Blood (collected 03-08-22 @ 12:27)  Source: .Blood Blood-Peripheral  Final Report (03-13-22 @ 13:00):    No growth at 5 days.      Creatinine, Serum: 0.86 mg/dL (03-16-22 @ 05:57)  Creatinine, Serum: 0.83 mg/dL (03-15-22 @ 05:52)  Creatinine, Serum: 0.90 mg/dL (03-14-22 @ 05:47)  Creatinine, Serum: 0.91 mg/dL (03-13-22 @ 11:29)  Creatinine, Serum: 1.04 mg/dL (03-12-22 @ 07:54)    Procalcitonin, Serum: 0.63 ng/mL (03-14-22 @ 05:47)          WBC Count: 19.75 K/uL (03-16-22 @ 05:57)  WBC Count: 19.47 K/uL (03-15-22 @ 05:52)  WBC Count: 20.38 K/uL (03-14-22 @ 05:47)  WBC Count: 15.12 K/uL (03-13-22 @ 11:29)  WBC Count: 10.89 K/uL (03-12-22 @ 07:54)    COVID-19 PCR: NotDetec (03-14-22 @ 06:45)  SARS-CoV-2 Result: NotDetec (03-08-22 @ 12:34)

## 2022-03-17 NOTE — PROGRESS NOTE ADULT - SUBJECTIVE AND OBJECTIVE BOX
Patient Care Team:  Alex Haynes MD as PCP - General  Patrick Elliott MD as PCP - Claims Attributed  Patrick Elliott MD as Cardiologist (Cardiology)  Felix Montes De Oca MD  REASON FOR REFERRAL: Elevated troponin  Chief complaint: Back pain     Subjective     Patient is a 72 y.o. male presents from [facility] ER with complaints of mid to low back pain and bilateral leg pain and weakness.  He is a poor historian.  Per records from the [facility], a troponin was checked for unclear reasons was found to be elevated at 0.266.  He denies chest pain.  EKG revealed normal sinus rhythm with ventricular pacing.  CTA of the chest ruled out PE and other acute findings.  Thoracic vertebral compression deformities were noted on imaging at the [facility].  Troponin here at [facility] is 0.158. He denies shortness of breath, orthopnea, PND, palpitations, or syncope.  Again, he states the primary reason he presented to the ER was acute on chronic lower back pain with radiation down his legs.  He states the pain is sharp.  He reports the pain makes his legs weak and he did have a fall at home-he is unclear about the circumstances surrounding the fall, but does report he did not lose consciousness.  He is somewhat lethargic during my exam and his speech is somewhat difficult to understand.  He states this is because his mouth is dry.  He is oriented and answers questions appropriately.    He is followed by Dr. Elliott for his history of severe dilated nonischemic cardiomyopathy.  This was diagnosed in 2013.  At that time his LVEF was around 25% and he had only mild nonobstructive coronary disease on cardiac cath at that time.  His LVEF normalized in July 2015, and echos since that time have revealed preserved ejection fractions.  Echo February 2020 revealed an LVEF of 55% with mild LVH.  He had a nuclear stress test in December 2018 which was low risk for ischemia.  He has a biventricular defibrillator in  place.  His defibrillator interrogations [facility] [place]  He is also anticoagulated with Eliquis due to paroxysmal atrial fibrillation noted on previous ICD interrogations.  Telemetry this admission reveals he has been in normal sinus rhythm with ventricular pacing.    Review of Systems   Review of Systems   Constitutional: Positive for fatigue. Negative for diaphoresis, fever and unexpected weight change.   HENT: Negative for nosebleeds.    Respiratory: Negative for apnea, cough, chest tightness, shortness of breath and wheezing.    Cardiovascular: Negative for chest pain, palpitations and leg swelling.   Gastrointestinal: Negative for abdominal distention, nausea and vomiting.   Genitourinary: Negative for hematuria.   Musculoskeletal: Positive for back pain (and leg pain ). Negative for gait problem.        Reported fall at home    Skin: Negative for color change.   Neurological: Positive for weakness. Negative for dizziness, syncope and light-headedness.       History  Past Medical History:   Diagnosis Date   • AICD (automatic cardioverter/defibrillator) present    • CAD (coronary artery disease)    • CAD in native artery     7/2015- ECHO: EF 50-55% (increased from 2/2013- 25%), abnormal LV diastolic function. 8/2015- LexiScan: negative for ischemia/scar 2/2013- Cath: Mild CAD, non-ischemic cardiomyopathy.   • Cancer (CMS/HCC)     melamona   • Chest pain, unspecified    • Chronic combined systolic and diastolic congestive heart failure (CMS/HCC)      7/2015- ECHO: EF 50-55% (increased from 2/2013- 25%), abnormal LV diastolic function.as    • COPD (chronic obstructive pulmonary disease) (CMS/HCC)    • FH: chemotherapy    • HLD (hyperlipidemia)    • Hx of scabies    • Hyperlipidemia, mixed    • Hypertension    • Hypertension, benign    • Immune deficiency disorder (CMS/HCC)    • Left bundle branch block    • Myocardial infarction (CMS/HCC)    • Non-ischemic cardiomyopathy  (CMS/[facility])     LifeVest   • Status post implantation of automatic cardioverter/defibrillator (AICD)      Placed 4/2013. Interrogated by [facility].     Past Surgical History:   Procedure Laterality Date   • A-V CARDIAC PACEMAKER INSERTION     • CARDIAC DEFIBRILLATOR PLACEMENT Left 02/05/2013    Sev dil nonischemic cardiomyopathy. Mild CAD.   • CORONARY ANGIOPLASTY     • LYMPH NODE BIOPSY      left leg lymp node bx 2/2016   • OTHER SURGICAL HISTORY  04/29/2013    BS BiV ICD Implantation   • SKIN CANCER EXCISION       Family History   Problem Relation Age of Onset   • Heart disease Mother    • Heart disease Father      Social History     Tobacco Use   • Smoking status: Former Smoker     Years: 32.00     Types: Cigarettes   • Smokeless tobacco: Never Used   Substance Use Topics   • Alcohol use: No   • Drug use: No     Medications Prior to Admission   Medication Sig Dispense Refill Last Dose   • acetaminophen (TYLENOL) 325 MG tablet Take 650 mg by mouth Every 6 (Six) Hours As Needed for Mild Pain (1-3).   Taking   • apixaban (ELIQUIS) 5 MG tablet tablet Take 1 tablet by mouth 2 (Two) Times a Day. 60 tablet 11 Taking   • aspirin 81 MG tablet Take 81 mg by mouth Daily.   Taking   • budesonide-formoterol (SYMBICORT) 160-4.5 MCG/ACT inhaler Inhale 2 puffs 2 (Two) Times a Day.   Taking   • carvedilol (COREG) 25 MG tablet Take 25 mg by mouth 2 (Two) Times a Day With Meals.   Taking   • fenofibrate (TRICOR) 145 MG tablet Take 145 mg by mouth Daily.   Taking   • folic acid (FOLVITE) 1 MG tablet Take 1 mg by mouth Daily.   Taking   • furosemide (LASIX) 20 MG tablet Take 20 mg by mouth 2 (Two) Times a Day.   Taking   • HYDROcodone-acetaminophen (NORCO) 5-325 MG per tablet TK 1 T PO Q 8 HOURS PRN P  0 Taking   • HYDROcodone-acetaminophen (NORCO) 7.5-325 MG per tablet Take 1 tablet by mouth Every 6 (Six) Hours As Needed for moderate pain (4-6).   Taking   • ipratropium-albuterol (COMBIVENT RESPIMAT)  MCG/ACT inhaler Inhale  1 puff 4 (Four) Times a Day As Needed for wheezing.   Taking   • lisinopril (PRINIVIL,ZESTRIL) 40 MG tablet Take 40 mg by mouth Daily.   Taking   • nitroglycerin (NITROSTAT) 0.4 MG SL tablet Place 0.4 mg under the tongue Every 5 (Five) Minutes As Needed for chest pain. Take no more than 3 doses in 15 minutes.   Taking   • ondansetron ODT (ZOFRAN-ODT) 4 MG disintegrating tablet Take 1 tablet by mouth 4 (Four) Times a Day As Needed for Nausea or Vomiting. 20 tablet 0 Taking   • oxyCODONE-acetaminophen (PERCOCET) 5-325 MG per tablet Take 1 tablet by mouth Every 6 (Six) Hours As Needed.   Taking   • POTASSIUM CHLORIDE ER PO Take 10 mEq by mouth.   Taking   • rosuvastatin (CRESTOR) 40 MG tablet Take 40 mg by mouth Daily.   Taking       Current Facility-Administered Medications:   •  apixaban (ELIQUIS) tablet 5 mg, 5 mg, Oral, Q12H, Lee Jama,   •  [START ON 6/20/2020] aspirin EC tablet 81 mg, 81 mg, Oral, Daily, Lee Jama, DO  •  carvedilol (COREG) tablet 6.25 mg, 6.25 mg, Oral, BID With Meals, Lee Jama, DO  •  nitroglycerin (NITROSTAT) SL tablet 0.4 mg, 0.4 mg, Sublingual, Q5 Min PRN, Lee Jama, DO  •  ondansetron (ZOFRAN) injection 4 mg, 4 mg, Intravenous, Q6H PRN, Lee Jama,   •  [START ON 6/20/2020] rosuvastatin (CRESTOR) tablet 40 mg, 40 mg, Oral, Nightly, Lee Jama, DO  •  sodium chloride 0.9 % flush 10 mL, 10 mL, Intravenous, Q12H, Lee Jama,   •  sodium chloride 0.9 % flush 10 mL, 10 mL, Intravenous, PRN, Lee Jama,   •  sodium chloride 0.9 % infusion, 75 mL/hr, Intravenous, Continuous, Lee Jama,   Allergies:  Patient has no known allergies.    Objective     Vital Signs  Heart Rate:  [78] 78  Resp:  [22] 22  BP: (95)/(44) 95/44    Physical Exam:   Physical Exam   Constitutional: He is oriented to person, place, and time. He appears well-developed and well-nourished. He appears lethargic. No distress.   HENT:   Head: Normocephalic and  Subjective:66yMale POD#8 s/p right scrotal I&D of abscess. Pt s/p SPT insertion yesterday, wheeler removed.  Pt had low grade temp yesterday, 100.6, has been 99' since.  pt states he feels somewhat better today, still has some c/o pain in scrotum and penis.    SPT: yellow     Vital Signs Last 24 Hrs  T(C): 37.2 (17 Mar 2022 08:04), Max: 37.4 (16 Mar 2022 19:30)  T(F): 98.9 (17 Mar 2022 08:04), Max: 99.4 (16 Mar 2022 19:30)  HR: 101 (17 Mar 2022 08:04) (98 - 101)  BP: 148/90 (17 Mar 2022 08:04) (134/72 - 148/90)  BP(mean): --  RR: 18 (17 Mar 2022 04:26) (16 - 18)  SpO2: 94% (17 Mar 2022 08:04) (94% - 96%)  I&O's Detail    16 Mar 2022 07:01  -  17 Mar 2022 07:00  --------------------------------------------------------  IN:  Total IN: 0 mL    OUT:    Indwelling Catheter - Suprapubic (mL): 1300 mL    Indwelling Catheter - Urethral (mL): 950 mL  Total OUT: 2250 mL    Total NET: -2250 mL          Labs:                        9.6    16.32 )-----------( 487      ( 17 Mar 2022 06:42 )             30.2     03-17    136  |  99  |  13.6  ----------------------------<  111<H>  4.8   |  30.0<H>  |  0.78    Ca    8.7      17 Mar 2022 06:42  Phos  2.9     03-17  Mg     1.9     03-17    TPro  6.9  /  Alb  2.6<L>  /  TBili  0.5  /  DBili  x   /  AST  12  /  ALT  13  /  AlkPhos  143<H>  03-17         atraumatic.   Eyes: Pupils are equal, round, and reactive to light.   Neck: Normal range of motion. Neck supple. No JVD present.   Cardiovascular: Normal rate and regular rhythm.   Murmur (2/6 systolic ) heard.  Pulmonary/Chest: Effort normal and breath sounds normal. No respiratory distress. He exhibits no tenderness.   Abdominal: Soft. Bowel sounds are normal. He exhibits no distension. There is no tenderness.   Musculoskeletal: Normal range of motion. He exhibits no edema.   Neurological: He is oriented to person, place, and time. He appears lethargic.   Skin: Skin is warm and dry. He is not diaphoretic.   Psychiatric: He has a normal mood and affect. He is slowed.   Garbled speech      Results Review:     Lab Results (last 72 hours)     Procedure Component Value Units Date/Time    CK [[id number]]  (Normal) Collected:  06/19/20 1311    Specimen:  Blood Updated:  06/19/20 1353     Creatine Kinase 151 U/L     Troponin [[id number]]  (Abnormal) Collected:  06/19/20 1311    Specimen:  Blood Updated:  06/19/20 1353     Troponin T 0.158 ng/mL     Narrative:       Troponin T Reference Range:  <= 0.03 ng/mL-   Negative for AMI  >0.03 ng/mL-     Abnormal for myocardial necrosis.  Clinicians would have to utilize clinical acumen, EKG, Troponin and serial changes to determine if it is an Acute Myocardial Infarction or myocardial injury due to an underlying chronic condition.       Results may be falsely decreased if patient taking Biotin.      Basic Metabolic Panel [[id number]]  (Abnormal) Collected:  06/19/20 1311    Specimen:  Blood Updated:  06/19/20 1351     Glucose 114 mg/dL      BUN 22 mg/dL      Creatinine 1.59 mg/dL      Sodium 142 mmol/L      Potassium 3.6 mmol/L      Chloride 109 mmol/L      CO2 19.0 mmol/L      Calcium 8.8 mg/dL      eGFR Non African Amer 43 mL/min/1.73      BUN/Creatinine Ratio 13.8     Anion Gap 14.0 mmol/L     Narrative:       GFR Normal >60  Chronic Kidney Disease <60  Kidney Failure <15       Ethanol [[id number]] Collected:  06/19/20 1311    Specimen:  Blood Updated:  06/19/20 1349     Ethanol % <0.010 %     Narrative:       Not for legal purposes. Chain of Custody not followed.           Assessment/Plan     -Back pain with thoracic vertebral compression deformities on imaging from the [facility]-chronicity unknown  -Elevated troponin (0.266 at the [facility] and 0.158 at [facility]) of unknown significance in a patient without chest pain and a history of nonobstructive coronary disease  -History of severe dilated nonischemic cardiomyopathy with a biventricular ICD in place; LVEF normalized per 2015 echo and has remained normal per most recent echo in February of this year; he is compensated from a heart failure standpoint on exam  -Paroxysmal atrial fibrillation, anticoagulated with Eliquis, maintaining normal sinus rhythm  -Essential hypertension  -Mixed hyperlipidemia  -COPD  -CARMELLA versus CKD-creatinine 1.6    Plan-  Check EKG  Continue Eliquis for stroke prophylaxis for PAF  Continue Coreg for LV systolic dysfunction with attention to blood pressure  Lasix and lisinopril presumably on hold due to elevated creatinine and low  blood pressure  At this time, without chest pain or other symptoms suggestive of ischemia, no plans for further ischemic testing at this time.   Further recommendations per Dr. lEliott.    I discussed the patient's findings and my recommendations with patient and nursing staff.     Chikis Waters, APRN  06/19/20  14:29           Subjective:66yMale POD#8 s/p right scrotal I&D of abscess. Pt s/p SPT insertion yesterday, wheeler removed.  Pt had low grade temp yesterday, 100.6, has been 99' since.  pt states he feels somewhat better today, still has some c/o pain in scrotum and penis.    SPT: yellow     Vital Signs Last 24 Hrs  T(C): 37.2 (17 Mar 2022 08:04), Max: 37.4 (16 Mar 2022 19:30)  T(F): 98.9 (17 Mar 2022 08:04), Max: 99.4 (16 Mar 2022 19:30)  HR: 101 (17 Mar 2022 08:04) (98 - 101)  BP: 148/90 (17 Mar 2022 08:04) (134/72 - 148/90)  BP(mean): --  RR: 18 (17 Mar 2022 04:26) (16 - 18)  SpO2: 94% (17 Mar 2022 08:04) (94% - 96%)  I&O's Detail    16 Mar 2022 07:01  -  17 Mar 2022 07:00  --------------------------------------------------------  IN:  Total IN: 0 mL    OUT:    Indwelling Catheter - Suprapubic (mL): 1300 mL    Indwelling Catheter - Urethral (mL): 950 mL  Total OUT: 2250 mL    Total NET: -2250 mL          Labs:                        9.6    16.32 )-----------( 487      ( 17 Mar 2022 06:42 )             30.2     03-17    136  |  99  |  13.6  ----------------------------<  111<H>  4.8   |  30.0<H>  |  0.78    Ca    8.7      17 Mar 2022 06:42  Phos  2.9     03-17  Mg     1.9     03-17    TPro  6.9  /  Alb  2.6<L>  /  TBili  0.5  /  DBili  x   /  AST  12  /  ALT  13  /  AlkPhos  143<H>  03-17    Rapid HIV-1/2 Antibody (03.17.22 @ 06:42)    Rapid HIV-1/2 Antibody: Nonreact: This Rapid HIV test reactive results is preliminary.  Further  confirmatory testing according to the CDC/[place] HIV testing algorithm will  follow, and such confirmatory results must be considered in making a  diagnosis related to HIV infection. FurtherHIV tests include a HIV 4th  generation antibody/antigen assay, HIV confirmatory/differentiation  testing, and a nucleic acid testing if needed.  Method: Qualitative Immunoassay

## 2022-03-17 NOTE — PROGRESS NOTE ADULT - ASSESSMENT
65 y/o M with hx of HTN, HLD, prostate CA (S/P radiation therapy in 2013), COPD, Active smoker, Epididymo-orchitis, he was having right scrotal pain for 5 months, he noticed an abscess on the right scrotum. States he has been on and off abx with minimal relief, he had US doppler of scrotum on 1/21/22 which found Complex 1.6 cm sized right-sided intratesticular and an adjacent 1 cm sized probably extratesticular lesion concerning for intratesticular and extratesticular abscesses and associated large hydrocele, he had an MRI 02/03/2022 [facility]: two right side extratesticular abscesses, likely in the epididymal tail 1.6 + 1 cm. also a large right hydrocele, he is came in for elective scrotal exploration, right hydrocelectomy, right drainage or abscess and possible orchiectomy with Dr. Damián Parekh on 3/1/2022, he is s/p Right orchiectomy on 03/01, he discharge afterwards, he came back again on 03/08 with worsening swelling and pain, in the ER he was noted to be hypotensive,  Labs reveal leukocytosis, SAVANNA and lactic admitted in Surgical ICU for sepsis due to scrotal abscess, his cultures obtained and he was startec on IV antibiotics, his lactate improved with IV fluids, he had CT of abdomen and Pelvis done that showed Extensive subcutaneous edema in the penis and scrotum with a small amount of air in the right scrotum which is likely postsurgical.   Infectious etiology not be completely excluded, Left testis cannot clearly be seen on this study, he was seen by Urology, they took him ot the OR and did I&D, he was monitored in the ICU, later on his sepsis improved and then transferred under Urology service, he was on Vanc, metro and cefepime, his blood cultures came negative, his abscess cultures came positive SERRATIA /MRSA AND BACTEROIDES, ID saw the patient and changed antibiotics to Meropenum and  Vancomycin, he has wheeler's catheter, patient is feeling better, his swelling is improving, denies fever, chills, chest pain, sob, he has intermittent cough, medicine was consulted for comanagement.     Plan:     Scrotal Abscess s/p I&D:   his blood cultures came negative, his abscess cultures came positive SERRATIA /MRSA AND BACTEROIDES, ID saw the patient and changed antibiotics to Meropenum and  Vancomycin, he has wheeler's catheter,   pain meds as per primary team   wound care  Scrotal support  White count is up  will follow along with ID and Urology.  repeat imaging done showed Trace bilateral pleural effusions and bibasilar atelectasis, Few scattered ground glass opacities, may reflect sequelae of infectious/inflammatory small airway disease.  Scrotal drain in place, Persistent inflammation and edema within the scrotum extending cranially toward the perineal soft tissues, 1.8 x 2.1 x 2.8 cm fluid collection at the base of the right penis, may reflect abscess.  s/p draine placed by IR. white count is coming down.     Sepsis POA: resolved now.     HTN: patient was on hydrochlorothiazide 25 mg once a day and losartan 100 mg once a day, has been started on Losartan 25mg daily, will increase to his home dose of 50mg daily with holding parameters, will continue to monitor    HLD: Will continue with Simvastatin 40 mg once a day (at bedtime).     Hx of COPD: montelukast 10 mg once a day, duo nebs as needed    Hx of Prostate cancer: Tamsulosin 0.4 mg once a day    Hx of smoking: Nicotine patches offered, counselled for cessation.     DVT prophylaxis: Heparin SC.     Medicine team is going to follow along   65 y/o M with hx of HTN, HLD, prostate CA (S/P radiation therapy in 2013), COPD, Active smoker, Epididymo-orchitis, he was having right scrotal pain for 5 months, he noticed an abscess on the right scrotum. States he has been on and off abx with minimal relief, he had US doppler of scrotum on 1/21/22 which found Complex 1.6 cm sized right-sided intratesticular and an adjacent 1 cm sized probably extratesticular lesion concerning for intratesticular and extratesticular abscesses and associated large hydrocele, he had an MRI 02/03/2022 [facility]: two right side extratesticular abscesses, likely in the epididymal tail 1.6 + 1 cm. also a large right hydrocele, he is came in for elective scrotal exploration, right hydrocelectomy, right drainage or abscess and possible orchiectomy with Dr. Damián Parekh on 3/1/2022, he is s/p Right orchiectomy on 03/01, he discharge afterwards, he came back again on 03/08 with worsening swelling and pain, in the ER he was noted to be hypotensive,  Labs reveal leukocytosis, SAVANNA and lactic admitted in Surgical ICU for sepsis due to scrotal abscess, his cultures obtained and he was startec on IV antibiotics, his lactate improved with IV fluids, he had CT of abdomen and Pelvis done that showed Extensive subcutaneous edema in the penis and scrotum with a small amount of air in the right scrotum which is likely postsurgical.   Infectious etiology not be completely excluded, Left testis cannot clearly be seen on this study, he was seen by Urology, they took him ot the OR and did I&D, he was monitored in the ICU, later on his sepsis improved and then transferred under Urology service, he was on Vanc, metro and cefepime, his blood cultures came negative, his abscess cultures came positive SERRATIA /MRSA AND BACTEROIDES, ID saw the patient and changed antibiotics to Meropenum and  Vancomycin, he has wheeler's catheter, patient is feeling better, his swelling is improving, denies fever, chills, chest pain, sob, he has intermittent cough, medicine was consulted for comanagement.     Plan:     Scrotal Abscess s/p I&D:   his blood cultures came negative, his abscess cultures came positive SERRATIA /MRSA AND BACTEROIDES, ID saw the patient and changed antibiotics to Meropenum and  Vancomycin, he has wheeler's catheter,   pain meds as per primary team   wound care  Scrotal support  White count is up  will follow along with ID and Urology.  repeat imaging done showed Trace bilateral pleural effusions and bibasilar atelectasis, Few scattered ground glass opacities, may reflect sequelae of infectious/inflammatory small airway disease.  Scrotal drain in place, Persistent inflammation and edema within the scrotum extending cranially toward the perineal soft tissues, 1.8 x 2.1 x 2.8 cm fluid collection at the base of the right penis, may reflect abscess.  s/p draine placed by IR. white count is coming down.     Sepsis POA: resolved now.     HTN: patient was on hydrochlorothiazide 25 mg once a day and losartan 100 mg once a day, has been started on Losartan 25mg daily, will increase to his home dose of 50mg daily with holding parameters, will continue to monitor    HLD: Will continue with Simvastatin 40 mg once a day (at bedtime).     Hx of COPD: montelukast 10 mg once a day, duo nebs as needed    Hx of Prostate cancer: Tamsulosin 0.4 mg once a day    Hx of smoking: Nicotine patches offered, counselled for cessation.     DVT prophylaxis: Heparin SC.     Medicine team is going to follow along.

## 2022-03-17 NOTE — PROGRESS NOTE ADULT - ASSESSMENT
67 yo male s/p recent right orchiectomy, POD#8 s/p I&D scrotal abscess, SPT placement 3/16/22  - scrotal dressing changed, silvadene applied to eschar  - pt working with PT  - leukocytosis improving  - cont abx  - cont PT  - wound care daily 65 yo male s/p recent right orchiectomy, POD#8 s/p I&D scrotal abscess, SPT placement 3/16/22  - scrotal dressing changed, silvadene applied to eschar  - pt working with PT  - leukocytosis improving  - cont abx  - cont PT  - wound care daily  - HIV negative

## 2022-03-17 NOTE — PROGRESS NOTE ADULT - SUBJECTIVE AND OBJECTIVE BOX
HENRIQUE AMAYA    [id number]    66y      Male    Patient is a 66y old  Male who presents with a chief complaint of scrotal abscess (16 Mar 2022 09:21)      INTERVAL HPI/OVERNIGHT EVENTS:    Patient is doing ok, his pain in the genital region is well controlled, denies fever, chills, chest pain.     REVIEW OF SYSTEMS:    CONSTITUTIONAL: No fever, fatigue  RESPIRATORY: No cough, No shortness of breath  CARDIOVASCULAR: No chest pain, palpitations  GASTROINTESTINAL: No abdominal, No nausea, vomiting  NEUROLOGICAL: No headaches,  loss of strength.  MISCELLANEOUS: Pain in the scrotal area, better with pain meds         Vital Signs Last 24 Hrs  T(C): 37.2 (17 Mar 2022 08:04), Max: 37.4 (16 Mar 2022 19:30)  T(F): 98.9 (17 Mar 2022 08:04), Max: 99.4 (16 Mar 2022 19:30)  HR: 101 (17 Mar 2022 08:04) (98 - 101)  BP: 148/90 (17 Mar 2022 08:04) (134/72 - 148/90)  RR: 18 (17 Mar 2022 04:26) (16 - 18)  SpO2: 94% (17 Mar 2022 08:04) (94% - 96%)    PHYSICAL EXAM:    GENERAL: Middle age Obese male looking comfortable sitting on the chair   HEENT: PERRL, +EOMI  NECK: soft, Supple, No JVD,   CHEST/LUNG: Clear to auscultate bilaterally; No wheezing  HEART: S1S2+, Regular rate and rhythm; No murmurs  ABDOMEN: Soft, Nontender, Nondistended; Bowel sounds present  EXTREMITIES:  2+ Peripheral Pulses, No edema  SKIN: No rashes or lesions  NEURO: AAOX3, no focal deficits, no motor r sensory loss  PSYCH: normal mood  Genital exam: Swelling and tenderness of the penis, wheeler's in place      LABS:                        9.6    16.32 )-----------( 487      ( 17 Mar 2022 06:42 )             30.2     03-17    136  |  99  |  13.6  ----------------------------<  111<H>  4.8   |  30.0<H>  |  0.78    Ca    8.7      17 Mar 2022 06:42  Phos  2.9     03-17  Mg     1.9     03-17    TPro  6.9  /  Alb  2.6<L>  /  TBili  0.5  /  DBili  x   /  AST  12  /  ALT  13  /  AlkPhos  143<H>  03-17            I&O's Summary    16 Mar 2022 07:01  -  17 Mar 2022 07:00  --------------------------------------------------------  IN: 0 mL / OUT: 2250 mL / NET: -2250 mL        MEDICATIONS  (STANDING):  acetaminophen     Tablet .. 650 milliGRAM(s) Oral every 6 hours  albuterol/ipratropium for Nebulization 3 milliLiter(s) Nebulizer every 6 hours  BACItracin   Ointment 1 Application(s) Topical every 6 hours  chlorhexidine 2% Cloths 1 Application(s) Topical daily  heparin   Injectable 7500 Unit(s) SubCutaneous every 8 hours  influenza  Vaccine (HIGH DOSE) 0.7 milliLiter(s) IntraMuscular once  lactated ringers. 1000 milliLiter(s) (75 mL/Hr) IV Continuous <Continuous>  losartan 25 milliGRAM(s) Oral daily  meropenem  IVPB 1000 milliGRAM(s) IV Intermittent every 8 hours  montelukast 10 milliGRAM(s) Oral daily  polyethylene glycol 3350 17 Gram(s) Oral daily  senna 2 Tablet(s) Oral at bedtime  silver sulfADIAZINE 1% Cream 1 Application(s) Topical daily  simvastatin 40 milliGRAM(s) Oral at bedtime  tamsulosin 0.4 milliGRAM(s) Oral at bedtime  vancomycin  IVPB 1250 milliGRAM(s) IV Intermittent every 12 hours    MEDICATIONS  (PRN):  acetaminophen     Tablet .. 650 milliGRAM(s) Oral every 6 hours PRN Temp greater or equal to 38C (100.4F), Mild Pain (1 - 3)  oxyCODONE    IR 5 milliGRAM(s) Oral every 4 hours PRN Moderate Pain (4 - 6)  oxyCODONE    IR 10 milliGRAM(s) Oral every 4 hours PRN Severe Pain (7 - 10)

## 2022-03-17 NOTE — PROGRESS NOTE ADULT - ASSESSMENT
Patient is a 65 y/o male s/p right orchiectomy on 3/1, admitted 3/8/22     scrotal abscess. Now s/p I&D of scrotal absces 3/10   AND PLACED ON IV ABX   PT  WITH I  LEUKOCYTOSIS   PT WITH SERRATIA /MRSA AND BACTEROIDES    ON  MERREM/ VANCO FOR  BROADER COVERAGE   WBC DOWN TO 16  S/P SP CATHETER PLACEMENT BY IR      HIV NEGATIVE   CONTINUE CURRENT ABX  SPOKE TO SISTER AT BEDSIDE   WILL FOLLOWUP  WILL D/W

## 2022-03-18 LAB
ANION GAP SERPL CALC-SCNC: 10 MMOL/L — SIGNIFICANT CHANGE UP (ref 5–17)
BUN SERPL-MCNC: 12.9 MG/DL — SIGNIFICANT CHANGE UP (ref 8–20)
CALCIUM SERPL-MCNC: 8.5 MG/DL — LOW (ref 8.6–10.2)
CHLORIDE SERPL-SCNC: 99 MMOL/L — SIGNIFICANT CHANGE UP (ref 98–107)
CO2 SERPL-SCNC: 27 MMOL/L — SIGNIFICANT CHANGE UP (ref 22–29)
CREAT SERPL-MCNC: 0.77 MG/DL — SIGNIFICANT CHANGE UP (ref 0.5–1.3)
EGFR: 98 ML/MIN/1.73M2 — SIGNIFICANT CHANGE UP
GLUCOSE SERPL-MCNC: 95 MG/DL — SIGNIFICANT CHANGE UP (ref 70–99)
HCT VFR BLD CALC: 30.2 % — LOW (ref 39–50)
HGB BLD-MCNC: 9.5 G/DL — LOW (ref 13–17)
MCHC RBC-ENTMCNC: 31.5 GM/DL — LOW (ref 32–36)
MCHC RBC-ENTMCNC: 32.8 PG — SIGNIFICANT CHANGE UP (ref 27–34)
MCV RBC AUTO: 104.1 FL — HIGH (ref 80–100)
PLATELET # BLD AUTO: 525 K/UL — HIGH (ref 150–400)
POTASSIUM SERPL-MCNC: 4.8 MMOL/L — SIGNIFICANT CHANGE UP (ref 3.5–5.3)
POTASSIUM SERPL-SCNC: 4.8 MMOL/L — SIGNIFICANT CHANGE UP (ref 3.5–5.3)
RBC # BLD: 2.9 M/UL — LOW (ref 4.2–5.8)
RBC # FLD: 14.8 % — HIGH (ref 10.3–14.5)
SODIUM SERPL-SCNC: 136 MMOL/L — SIGNIFICANT CHANGE UP (ref 135–145)
VANCOMYCIN TROUGH SERPL-MCNC: 10.9 UG/ML — SIGNIFICANT CHANGE UP (ref 10–20)
WBC # BLD: 15.68 K/UL — HIGH (ref 3.8–10.5)
WBC # FLD AUTO: 15.68 K/UL — HIGH (ref 3.8–10.5)

## 2022-03-18 PROCEDURE — 99222 1ST HOSP IP/OBS MODERATE 55: CPT

## 2022-03-18 PROCEDURE — 99232 SBSQ HOSP IP/OBS MODERATE 35: CPT

## 2022-03-18 RX ORDER — DIPHENHYDRAMINE HYDROCHLORIDE AND LIDOCAINE HYDROCHLORIDE AND ALUMINUM HYDROXIDE AND MAGNESIUM HYDRO
10 KIT
Refills: 0 | Status: DISCONTINUED | OUTPATIENT
Start: 2022-03-18 | End: 2022-03-24

## 2022-03-18 RX ADMIN — LOSARTAN POTASSIUM 50 MILLIGRAM(S): 100 TABLET, FILM COATED ORAL at 06:26

## 2022-03-18 RX ADMIN — OXYCODONE HYDROCHLORIDE 10 MILLIGRAM(S): 5 TABLET ORAL at 16:00

## 2022-03-18 RX ADMIN — Medication 3 MILLILITER(S): at 16:39

## 2022-03-18 RX ADMIN — Medication 650 MILLIGRAM(S): at 17:55

## 2022-03-18 RX ADMIN — MEROPENEM 100 MILLIGRAM(S): 1 INJECTION INTRAVENOUS at 06:25

## 2022-03-18 RX ADMIN — OXYCODONE HYDROCHLORIDE 10 MILLIGRAM(S): 5 TABLET ORAL at 06:52

## 2022-03-18 RX ADMIN — SIMVASTATIN 40 MILLIGRAM(S): 20 TABLET, FILM COATED ORAL at 23:31

## 2022-03-18 RX ADMIN — Medication 1 APPLICATION(S): at 13:09

## 2022-03-18 RX ADMIN — SENNA PLUS 2 TABLET(S): 8.6 TABLET ORAL at 23:31

## 2022-03-18 RX ADMIN — Medication 1 APPLICATION(S): at 06:26

## 2022-03-18 RX ADMIN — OXYCODONE HYDROCHLORIDE 10 MILLIGRAM(S): 5 TABLET ORAL at 21:19

## 2022-03-18 RX ADMIN — MONTELUKAST 10 MILLIGRAM(S): 4 TABLET, CHEWABLE ORAL at 13:08

## 2022-03-18 RX ADMIN — HEPARIN SODIUM 7500 UNIT(S): 5000 INJECTION INTRAVENOUS; SUBCUTANEOUS at 06:26

## 2022-03-18 RX ADMIN — Medication 1 APPLICATION(S): at 13:08

## 2022-03-18 RX ADMIN — OXYCODONE HYDROCHLORIDE 10 MILLIGRAM(S): 5 TABLET ORAL at 12:00

## 2022-03-18 RX ADMIN — TAMSULOSIN HYDROCHLORIDE 0.4 MILLIGRAM(S): 0.4 CAPSULE ORAL at 23:31

## 2022-03-18 RX ADMIN — Medication 650 MILLIGRAM(S): at 23:31

## 2022-03-18 RX ADMIN — OXYCODONE HYDROCHLORIDE 10 MILLIGRAM(S): 5 TABLET ORAL at 20:19

## 2022-03-18 RX ADMIN — HEPARIN SODIUM 7500 UNIT(S): 5000 INJECTION INTRAVENOUS; SUBCUTANEOUS at 13:07

## 2022-03-18 RX ADMIN — OXYCODONE HYDROCHLORIDE 10 MILLIGRAM(S): 5 TABLET ORAL at 11:17

## 2022-03-18 RX ADMIN — OXYCODONE HYDROCHLORIDE 10 MILLIGRAM(S): 5 TABLET ORAL at 07:22

## 2022-03-18 RX ADMIN — Medication 650 MILLIGRAM(S): at 06:26

## 2022-03-18 RX ADMIN — Medication 166.67 MILLIGRAM(S): at 17:20

## 2022-03-18 RX ADMIN — Medication 650 MILLIGRAM(S): at 06:56

## 2022-03-18 RX ADMIN — Medication 3 MILLILITER(S): at 02:26

## 2022-03-18 RX ADMIN — OXYCODONE HYDROCHLORIDE 10 MILLIGRAM(S): 5 TABLET ORAL at 15:34

## 2022-03-18 RX ADMIN — MEROPENEM 100 MILLIGRAM(S): 1 INJECTION INTRAVENOUS at 13:07

## 2022-03-18 RX ADMIN — Medication 650 MILLIGRAM(S): at 13:08

## 2022-03-18 RX ADMIN — POLYETHYLENE GLYCOL 3350 17 GRAM(S): 17 POWDER, FOR SOLUTION ORAL at 13:07

## 2022-03-18 RX ADMIN — HEPARIN SODIUM 7500 UNIT(S): 5000 INJECTION INTRAVENOUS; SUBCUTANEOUS at 23:32

## 2022-03-18 RX ADMIN — Medication 3 MILLILITER(S): at 22:06

## 2022-03-18 RX ADMIN — Medication 3 MILLILITER(S): at 08:57

## 2022-03-18 RX ADMIN — Medication 1 APPLICATION(S): at 23:32

## 2022-03-18 RX ADMIN — Medication 650 MILLIGRAM(S): at 17:21

## 2022-03-18 RX ADMIN — CHLORHEXIDINE GLUCONATE 1 APPLICATION(S): 213 SOLUTION TOPICAL at 13:09

## 2022-03-18 RX ADMIN — DIPHENHYDRAMINE HYDROCHLORIDE AND LIDOCAINE HYDROCHLORIDE AND ALUMINUM HYDROXIDE AND MAGNESIUM HYDRO 10 MILLILITER(S): KIT at 17:21

## 2022-03-18 RX ADMIN — Medication 166.67 MILLIGRAM(S): at 07:58

## 2022-03-18 RX ADMIN — MEROPENEM 100 MILLIGRAM(S): 1 INJECTION INTRAVENOUS at 23:32

## 2022-03-18 RX ADMIN — Medication 1 APPLICATION(S): at 17:21

## 2022-03-18 RX ADMIN — Medication 650 MILLIGRAM(S): at 14:00

## 2022-03-18 NOTE — PROGRESS NOTE ADULT - SUBJECTIVE AND OBJECTIVE BOX
Subjective:66yMale with scrotal abscess, sepsis on admission, s/p I&D scrotal abscess POD#9.  pt feeling better, still has some c/o pain in penis and scrotum, mildly improved.  Pt was OOB to chair, ambulated yesterday.  Pt afebrile.    SPT: clear, yellow    Vital Signs Last 24 Hrs  T(C): 37.7 (18 Mar 2022 08:11), Max: 37.7 (18 Mar 2022 08:11)  T(F): 99.9 (18 Mar 2022 08:11), Max: 99.9 (18 Mar 2022 08:11)  HR: 95 (18 Mar 2022 08:57) (90 - 103)  BP: 138/84 (18 Mar 2022 08:11) (137/88 - 153/99)  BP(mean): --  RR: 18 (18 Mar 2022 04:45) (17 - 18)  SpO2: 99% (18 Mar 2022 08:57) (91% - 99%)  I&O's Detail    17 Mar 2022 07:01  -  18 Mar 2022 07:00  --------------------------------------------------------  IN:  Total IN: 0 mL    OUT:    Indwelling Catheter - Suprapubic (mL): 1600 mL  Total OUT: 1600 mL    Total NET: -1600 mL          Labs:                        9.5    15.68 )-----------( 525      ( 18 Mar 2022 07:20 )             30.2     03-18    136  |  99  |  12.9  ----------------------------<  95  4.8   |  27.0  |  0.77    Ca    8.5<L>      18 Mar 2022 07:20  Phos  2.9     03-17  Mg     1.9     03-17    TPro  6.9  /  Alb  2.6<L>  /  TBili  0.5  /  DBili  x   /  AST  12  /  ALT  13  /  AlkPhos  143<H>  03-17

## 2022-03-18 NOTE — PROGRESS NOTE ADULT - SUBJECTIVE AND OBJECTIVE BOX
INFECTIOUS DISEASES AND INTERNAL MEDICINE at [place]  =======================================================  Eliud Jarvis MD  Diplomates [employer] Board of Internal Medicine and Infectious Diseases  Telephone [phone]  Fax            [phone]  =======================================================    HENRIQUE AAMYA [id number]    Follow up: LEUKOCYTOSIS SCROTAL INFECTION     Allergies:  penicillins (Unknown)      Medications:  acetaminophen     Tablet .. 650 milliGRAM(s) Oral every 6 hours PRN  acetaminophen     Tablet .. 650 milliGRAM(s) Oral every 6 hours  albuterol/ipratropium for Nebulization 3 milliLiter(s) Nebulizer every 6 hours  BACItracin   Ointment 1 Application(s) Topical every 6 hours  chlorhexidine 2% Cloths 1 Application(s) Topical daily  heparin   Injectable 7500 Unit(s) SubCutaneous every 8 hours  influenza  Vaccine (HIGH DOSE) 0.7 milliLiter(s) IntraMuscular once  lactated ringers. 1000 milliLiter(s) IV Continuous <Continuous>  losartan 25 milliGRAM(s) Oral daily  meropenem  IVPB 1000 milliGRAM(s) IV Intermittent every 8 hours  montelukast 10 milliGRAM(s) Oral daily  oxyCODONE    IR 5 milliGRAM(s) Oral every 4 hours PRN  oxyCODONE    IR 10 milliGRAM(s) Oral every 4 hours PRN  polyethylene glycol 3350 17 Gram(s) Oral daily  senna 2 Tablet(s) Oral at bedtime  silver sulfADIAZINE 1% Cream 1 Application(s) Topical daily  simvastatin 40 milliGRAM(s) Oral at bedtime  tamsulosin 0.4 milliGRAM(s) Oral at bedtime  vancomycin  IVPB 1000 milliGRAM(s) IV Intermittent every 12 hours    SOCIAL       FAMILY   FAMILY HISTORY:  Family history not known due to adoption (Father, Mother)      REVIEW OF SYSTEMS:  CONSTITUTIONAL:  No Fever or chills  HEENT:   No diplopia or blurred vision.  No earache, sore throat or runny nose.  CARDIOVASCULAR:  No pressure, squeezing, strangling, tightness, heaviness or aching about the chest, neck, axilla or epigastrium.  RESPIRATORY:    cough, shortness of breath,   GASTROINTESTINAL:  No nausea, vomiting or diarrhea.  GENITOURINARY:  AS PER HPI   MUSCULOSKELETAL:   moves all joints  SKIN:  No change in skin, hair or nails.  NEUROLOGIC:  No paresthesias, fasciculations, seizures or weakness.  PSYCHIATRIC:  No disorder of thought or mood.  ENDOCRINE:  No heat or cold intolerance, polyuria or polydipsia.  HEMATOLOGICAL:  No easy bruising or bleeding.            Physical Exam:  I Vital Signs Last 24 Hrs  T(C): 37.7 (18 Mar 2022 08:11), Max: 37.7 (18 Mar 2022 08:11)  T(F): 99.9 (18 Mar 2022 08:11), Max: 99.9 (18 Mar 2022 08:11)  HR: 95 (18 Mar 2022 08:57) (90 - 103)  BP: 138/84 (18 Mar 2022 08:11) (137/88 - 153/99)  BP(mean): --  RR: 18 (18 Mar 2022 04:45) (17 - 18)  SpO2: 99% (18 Mar 2022 08:57) (91% - 99%)    GEN: NAD,   HEENT: normocephalic and atraumatic. EOMI. JADE.    NECK: Supple. No carotid bruits.  No lymphadenopathy or thyromegaly.  LUNGS: Clear to auscultation.  HEART: Regular rate and rhythm without murmur.  ABDOMEN: Soft, nontender, and nondistended.  Positive bowel sounds.    :   DECREASED PENILE EDEMA AND ERYTHEMA  SCROTAL WOUND DRESSED SUPRAPUBIC CATHETER IN PLACE  EXTREMITIES: Without any cyanosis, clubbing, rash, lesions or edema.  MSK: no joint swelling  NEUROLOGIC: Cranial nerves II through XII are grossly intact.  PSYCHIATRIC: Appropriate affect .  SKIN: No ulceration or induration present.        Labs:     =======================================================  Current Antibiotics:  meropenem  IVPB 1000 milliGRAM(s) IV Intermittent every 8 hours  vancomycin  IVPB 1000 milliGRAM(s) IV Intermittent every 12 hours    Other medications:  acetaminophen     Tablet .. 650 milliGRAM(s) Oral every 6 hours  albuterol/ipratropium for Nebulization 3 milliLiter(s) Nebulizer every 6 hours  BACItracin   Ointment 1 Application(s) Topical every 6 hours  chlorhexidine 2% Cloths 1 Application(s) Topical daily  heparin   Injectable 7500 Unit(s) SubCutaneous every 8 hours  influenza  Vaccine (HIGH DOSE) 0.7 milliLiter(s) IntraMuscular once  lactated ringers. 1000 milliLiter(s) IV Continuous <Continuous>  losartan 25 milliGRAM(s) Oral daily  montelukast 10 milliGRAM(s) Oral daily  polyethylene glycol 3350 17 Gram(s) Oral daily  senna 2 Tablet(s) Oral at bedtime  silver sulfADIAZINE 1% Cream 1 Application(s) Topical daily  simvastatin 40 milliGRAM(s) Oral at bedtime  tamsulosin 0.4 milliGRAM(s) Oral at bedtime      =======================================================  Labs:                                                          9.5    15.68 )-----------( 525      ( 18 Mar 2022 07:20 )             30.2   03-18    136  |  99  |  12.9  ----------------------------<  95  4.8   |  27.0  |  0.77    Ca    8.5<L>      18 Mar 2022 07:20  Phos  2.9     03-17  Mg     1.9     03-17    TPro  6.9  /  Alb  2.6<L>  /  TBili  0.5  /  DBili  x   /  AST  12  /  ALT  13  /  AlkPhos  143<H>  03-17          Culture - Fungal, Other (collected 03-09-22 @ 21:49)  Source: .Other scrotal abscess    Culture - Abscess with Gram Stain (collected 03-09-22 @ 21:48)  Source: .Abscess scrotal abscess  Final Report (03-11-22 @ 21:20):    Moderate Methicillin Resistant Staphylococcus aureus    Rare Serratia marcescens    Few Bacteroides fragilis "Susceptibilities not performed"  Organism: Methicillin resistant Staphylococcus aureus  Serratia marcescens (03-11-22 @ 21:20)  Organism: Serratia marcescens (03-11-22 @ 21:20)    Sensitivities:      -  Amikacin: S <=16      -  Amoxicillin/Clavulanic Acid: R >16/8      -  Ampicillin: R 16 These ampicillin results predict results for amoxicillin      -  Ampicillin/Sulbactam: R 8/4 Enterobacter, Klebsiella aerogenes, Citrobacter, and Serratia may develop resistance during prolonged therapy (3-4 days)      -  Aztreonam: S <=4      -  Cefazolin: R >16 Enterobacter, Klebsiella aerogenes, Citrobacter, and Serratia may develop resistance during prolonged therapy (3-4 days)      -  Cefepime: S <=2      -  Cefoxitin: R <=8      -  Ceftriaxone: S <=1 Enterobacter, Klebsiella aerogenes, Citrobacter, and Serratia may develop resistance during prolonged therapy      -  Ciprofloxacin: R >2      -  Ertapenem: S <=0.5      -  Gentamicin: S <=2      -  Levofloxacin: R >4      -  Meropenem: S <=1      -  Piperacillin/Tazobactam: S <=8      -  Tobramycin: S <=2      -  Trimethoprim/Sulfamethoxazole: S 1/19      Method Type: RYAN  Organism: Methicillin resistant Staphylococcus aureus (03-11-22 @ 21:20)    Sensitivities:      -  Ampicillin/Sulbactam: R <=8/4      -  Cefazolin: R <=4      -  Clindamycin: R >4      -  Daptomycin: S 1      -  Erythromycin: R >4      -  Gentamicin: S <=1 Should not be used as monotherapy      -  Linezolid: S 2      -  Oxacillin: R >2      -  Penicillin: R >8      -  Rifampin: S <=1 Should not be used as monotherapy      -  Tetra/Doxy: S 2      -  Trimethoprim/Sulfamethoxazole: S <=0.5/9.5      -  Vancomycin: S 2      Method Type: RYAN    Culture - Urine (collected 03-08-22 @ 22:16)  Source: Clean Catch Clean Catch (Midstream)  Final Report (03-09-22 @ 18:06):    No growth    Culture - Blood (collected 03-08-22 @ 12:28)  Source: .Blood Blood-Peripheral  Final Report (03-13-22 @ 13:01):    No growth at 5 days.    Culture - Blood (collected 03-08-22 @ 12:27)  Source: .Blood Blood-Peripheral  Final Report (03-13-22 @ 13:00):    No growth at 5 days.      Creatinine, Serum: 0.86 mg/dL (03-16-22 @ 05:57)  Creatinine, Serum: 0.83 mg/dL (03-15-22 @ 05:52)  Creatinine, Serum: 0.90 mg/dL (03-14-22 @ 05:47)  Creatinine, Serum: 0.91 mg/dL (03-13-22 @ 11:29)  Creatinine, Serum: 1.04 mg/dL (03-12-22 @ 07:54)    Procalcitonin, Serum: 0.63 ng/mL (03-14-22 @ 05:47)          WBC Count: 19.75 K/uL (03-16-22 @ 05:57)  WBC Count: 19.47 K/uL (03-15-22 @ 05:52)  WBC Count: 20.38 K/uL (03-14-22 @ 05:47)  WBC Count: 15.12 K/uL (03-13-22 @ 11:29)  WBC Count: 10.89 K/uL (03-12-22 @ 07:54)    COVID-19 PCR: NotDetec (03-14-22 @ 06:45)  SARS-CoV-2 Result: NotDetec (03-08-22 @ 12:34)

## 2022-03-18 NOTE — PROGRESS NOTE ADULT - ASSESSMENT
65 y/o M with hx of HTN, HLD, prostate CA (S/P radiation therapy in 2013), COPD, Active smoker, Epididymo-orchitis, he was having right scrotal pain for 5 months, he noticed an abscess on the right scrotum. States he has been on and off abx with minimal relief, he had US doppler of scrotum on 1/21/22 which found Complex 1.6 cm sized right-sided intratesticular and an adjacent 1 cm sized probably extratesticular lesion concerning for intratesticular and extratesticular abscesses and associated large hydrocele, he had an MRI 02/03/2022 [facility]: two right side extratesticular abscesses, likely in the epididymal tail 1.6 + 1 cm. also a large right hydrocele, he is came in for elective scrotal exploration, right hydrocelectomy, right drainage or abscess and possible orchiectomy with Dr. Damián Parekh on 3/1/2022, he is s/p Right orchiectomy on 03/01, he discharge afterwards, he came back again on 03/08 with worsening swelling and pain, in the ER he was noted to be hypotensive,  Labs reveal leukocytosis, SAVANNA and lactic admitted in Surgical ICU for sepsis due to scrotal abscess, his cultures obtained and he was startec on IV antibiotics, his lactate improved with IV fluids, he had CT of abdomen and Pelvis done that showed Extensive subcutaneous edema in the penis and scrotum with a small amount of air in the right scrotum which is likely postsurgical.   Infectious etiology not be completely excluded, Left testis cannot clearly be seen on this study, he was seen by Urology, they took him ot the OR and did I&D, he was monitored in the ICU, later on his sepsis improved and then transferred under Urology service, he was on Vanc, metro and cefepime, his blood cultures came negative, his abscess cultures came positive SERRATIA /MRSA AND BACTEROIDES, ID saw the patient and changed antibiotics to Meropenum and  Vancomycin, he has wheeler's catheter, patient is feeling better, his swelling is improving, denies fever, chills, chest pain, sob, he has intermittent cough, medicine was consulted for comanagement.     Plan:     Scrotal Abscess s/p I&D:   his blood cultures came negative, his abscess cultures came positive SERRATIA /MRSA AND BACTEROIDES, ID saw the patient and changed antibiotics to Meropenum and  Vancomycin, he has wheeler's catheter,   pain meds as per primary team   wound care  Scrotal support  White count is up  will follow along with ID and Urology.  repeat imaging done showed Trace bilateral pleural effusions and bibasilar atelectasis, Few scattered ground glass opacities, may reflect sequelae of infectious/inflammatory small airway disease.  Scrotal drain in place, Persistent inflammation and edema within the scrotum extending cranially toward the perineal soft tissues, 1.8 x 2.1 x 2.8 cm fluid collection at the base of the right penis, may reflect abscess.  s/p draine placed by IR. white count is coming down, will continue to monitor CBC, further antibiotic therapy as per ID and primary team    Sepsis POA: resolved now.     HTN: patient was on hydrochlorothiazide 25 mg once a day and losartan 100 mg once a day, on Losartan 50mg daily with holding parameters, will continue to monitor    HLD: Will continue with Simvastatin 40 mg once a day (at bedtime).     Hx of COPD: montelukast 10 mg once a day, duo nebs as needed    Hx of Prostate cancer: Tamsulosin 0.4 mg once a day    Hx of smoking: Nicotine patches offered, counselled for cessation.     DVT prophylaxis: Heparin SC.     Medicine team is signing off, please call as needed.

## 2022-03-18 NOTE — PROGRESS NOTE ADULT - ASSESSMENT
67 yo male with scrotal abscess, s/p recent right orchiectomy, POD#9 s/p scrotal abscess I&D, leukocytosis slowly improved  - wound care performed  - cont abx  - trend wbc  - remains afebrile  - cont OOB, ambulate  - PT

## 2022-03-18 NOTE — PROGRESS NOTE ADULT - ASSESSMENT
Patient is a 67 y/o male s/p right orchiectomy on 3/1, admitted 3/8/22     scrotal abscess. Now s/p I&D of scrotal absces 3/10   AND PLACED ON IV ABX   PT  WITH I  LEUKOCYTOSIS   PT WITH SERRATIA /MRSA AND BACTEROIDES    ON  MERREM/ VANCO FOR  BROADER COVERAGE   WBC DOWN TO 15  S/P SP CATHETER PLACEMENT BY IR      HIV NEGATIVE   CONTINUE CURRENT ABX  PT COMPLAINS OF APTHOUS ULCERS ON TONGUE AND LIP   WRITTEN FOR MAGIC MOUTH WASH  IF NO CHANGE THEN TRY KENALOG IN ORABASE  SPOKE TO SISTER AT BEDSIDE   WILL FOLLOWUP  WILL D/W

## 2022-03-18 NOTE — PROGRESS NOTE ADULT - SUBJECTIVE AND OBJECTIVE BOX
HENRIQUE AMAYA    [id number]    66y      Male    Patient is a 66y old  Male who presents with a chief complaint of scrotal abscess (16 Mar 2022 09:21)      INTERVAL HPI/OVERNIGHT EVENTS:    Patient is doing ok, his pain in the genital region is well controlled, he feels his swelling is improving as well, denies fever, chills, chest pain.     REVIEW OF SYSTEMS:    CONSTITUTIONAL: No fever, fatigue  RESPIRATORY: No cough, No shortness of breath  CARDIOVASCULAR: No chest pain, palpitations  GASTROINTESTINAL: No abdominal, No nausea, vomiting  NEUROLOGICAL: No headaches,  loss of strength.  MISCELLANEOUS: Pain in the scrotal area, better with pain meds         Vital Signs Last 24 Hrs  T(C): 37.7 (18 Mar 2022 08:11), Max: 37.7 (18 Mar 2022 08:11)  T(F): 99.9 (18 Mar 2022 08:11), Max: 99.9 (18 Mar 2022 08:11)  HR: 99 (18 Mar 2022 08:11) (90 - 103)  BP: 138/84 (18 Mar 2022 08:11) (137/88 - 153/99)  RR: 18 (18 Mar 2022 04:45) (17 - 18)  SpO2: 99% (18 Mar 2022 08:11) (91% - 99%)    PHYSICAL EXAM:    GENERAL: Middle age Obese male looking comfortable sitting on the chair   HEENT: PERRL, +EOMI  NECK: soft, Supple, No JVD,   CHEST/LUNG: Clear to auscultate bilaterally; No wheezing  HEART: S1S2+, Regular rate and rhythm; No murmurs  ABDOMEN: Soft, Nontender, Nondistended; Bowel sounds present  EXTREMITIES:  2+ Peripheral Pulses, No edema  SKIN: No rashes or lesions  NEURO: AAOX3, no focal deficits, no motor r sensory loss  PSYCH: normal mood  Genital exam: Swelling and tenderness of the penis, wheeler's in place      LABS:                        9.5    15.68 )-----------( 525      ( 18 Mar 2022 07:20 )             30.2     03-18    136  |  99  |  12.9  ----------------------------<  95  4.8   |  27.0  |  0.77    Ca    8.5<L>      18 Mar 2022 07:20  Phos  2.9     03-17  Mg     1.9     03-17    TPro  6.9  /  Alb  2.6<L>  /  TBili  0.5  /  DBili  x   /  AST  12  /  ALT  13  /  AlkPhos  143<H>  03-17            I&O's Summary    17 Mar 2022 07:01  -  18 Mar 2022 07:00  --------------------------------------------------------  IN: 0 mL / OUT: 1600 mL / NET: -1600 mL        MEDICATIONS  (STANDING):  acetaminophen     Tablet .. 650 milliGRAM(s) Oral every 6 hours  albuterol/ipratropium for Nebulization 3 milliLiter(s) Nebulizer every 6 hours  BACItracin   Ointment 1 Application(s) Topical every 6 hours  chlorhexidine 2% Cloths 1 Application(s) Topical daily  heparin   Injectable 7500 Unit(s) SubCutaneous every 8 hours  influenza  Vaccine (HIGH DOSE) 0.7 milliLiter(s) IntraMuscular once  lactated ringers. 1000 milliLiter(s) (75 mL/Hr) IV Continuous <Continuous>  losartan 50 milliGRAM(s) Oral daily  meropenem  IVPB 1000 milliGRAM(s) IV Intermittent every 8 hours  montelukast 10 milliGRAM(s) Oral daily  polyethylene glycol 3350 17 Gram(s) Oral daily  senna 2 Tablet(s) Oral at bedtime  silver sulfADIAZINE 1% Cream 1 Application(s) Topical daily  simvastatin 40 milliGRAM(s) Oral at bedtime  tamsulosin 0.4 milliGRAM(s) Oral at bedtime  vancomycin  IVPB 1250 milliGRAM(s) IV Intermittent every 12 hours    MEDICATIONS  (PRN):  acetaminophen     Tablet .. 650 milliGRAM(s) Oral every 6 hours PRN Temp greater or equal to 38C (100.4F), Mild Pain (1 - 3)  oxyCODONE    IR 5 milliGRAM(s) Oral every 4 hours PRN Moderate Pain (4 - 6)  oxyCODONE    IR 10 milliGRAM(s) Oral every 4 hours PRN Severe Pain (7 - 10)

## 2022-03-19 LAB
ANION GAP SERPL CALC-SCNC: 11 MMOL/L — SIGNIFICANT CHANGE UP (ref 5–17)
BUN SERPL-MCNC: 15.1 MG/DL — SIGNIFICANT CHANGE UP (ref 8–20)
CALCIUM SERPL-MCNC: 8.8 MG/DL — SIGNIFICANT CHANGE UP (ref 8.6–10.2)
CHLORIDE SERPL-SCNC: 98 MMOL/L — SIGNIFICANT CHANGE UP (ref 98–107)
CO2 SERPL-SCNC: 28 MMOL/L — SIGNIFICANT CHANGE UP (ref 22–29)
CREAT SERPL-MCNC: 0.86 MG/DL — SIGNIFICANT CHANGE UP (ref 0.5–1.3)
EGFR: 94 ML/MIN/1.73M2 — SIGNIFICANT CHANGE UP
GLUCOSE SERPL-MCNC: 102 MG/DL — HIGH (ref 70–99)
HCT VFR BLD CALC: 30.8 % — LOW (ref 39–50)
HGB BLD-MCNC: 9.5 G/DL — LOW (ref 13–17)
MAGNESIUM SERPL-MCNC: 2 MG/DL — SIGNIFICANT CHANGE UP (ref 1.6–2.6)
MCHC RBC-ENTMCNC: 30.8 GM/DL — LOW (ref 32–36)
MCHC RBC-ENTMCNC: 32.5 PG — SIGNIFICANT CHANGE UP (ref 27–34)
MCV RBC AUTO: 105.5 FL — HIGH (ref 80–100)
PHOSPHATE SERPL-MCNC: 3.4 MG/DL — SIGNIFICANT CHANGE UP (ref 2.4–4.7)
PLATELET # BLD AUTO: 561 K/UL — HIGH (ref 150–400)
POTASSIUM SERPL-MCNC: 4.9 MMOL/L — SIGNIFICANT CHANGE UP (ref 3.5–5.3)
POTASSIUM SERPL-SCNC: 4.9 MMOL/L — SIGNIFICANT CHANGE UP (ref 3.5–5.3)
RBC # BLD: 2.92 M/UL — LOW (ref 4.2–5.8)
RBC # FLD: 14.7 % — HIGH (ref 10.3–14.5)
SODIUM SERPL-SCNC: 137 MMOL/L — SIGNIFICANT CHANGE UP (ref 135–145)
WBC # BLD: 13.71 K/UL — HIGH (ref 3.8–10.5)
WBC # FLD AUTO: 13.71 K/UL — HIGH (ref 3.8–10.5)

## 2022-03-19 RX ORDER — KETOROLAC TROMETHAMINE 30 MG/ML
15 SYRINGE (ML) INJECTION ONCE
Refills: 0 | Status: DISCONTINUED | OUTPATIENT
Start: 2022-03-19 | End: 2022-03-19

## 2022-03-19 RX ADMIN — Medication 166.67 MILLIGRAM(S): at 08:47

## 2022-03-19 RX ADMIN — OXYCODONE HYDROCHLORIDE 10 MILLIGRAM(S): 5 TABLET ORAL at 14:40

## 2022-03-19 RX ADMIN — HEPARIN SODIUM 7500 UNIT(S): 5000 INJECTION INTRAVENOUS; SUBCUTANEOUS at 13:18

## 2022-03-19 RX ADMIN — Medication 15 MILLIGRAM(S): at 16:25

## 2022-03-19 RX ADMIN — Medication 1 APPLICATION(S): at 17:42

## 2022-03-19 RX ADMIN — SIMVASTATIN 40 MILLIGRAM(S): 20 TABLET, FILM COATED ORAL at 21:36

## 2022-03-19 RX ADMIN — HEPARIN SODIUM 7500 UNIT(S): 5000 INJECTION INTRAVENOUS; SUBCUTANEOUS at 05:58

## 2022-03-19 RX ADMIN — SENNA PLUS 2 TABLET(S): 8.6 TABLET ORAL at 21:35

## 2022-03-19 RX ADMIN — MEROPENEM 100 MILLIGRAM(S): 1 INJECTION INTRAVENOUS at 05:57

## 2022-03-19 RX ADMIN — MEROPENEM 100 MILLIGRAM(S): 1 INJECTION INTRAVENOUS at 21:37

## 2022-03-19 RX ADMIN — Medication 1 APPLICATION(S): at 11:59

## 2022-03-19 RX ADMIN — OXYCODONE HYDROCHLORIDE 10 MILLIGRAM(S): 5 TABLET ORAL at 18:37

## 2022-03-19 RX ADMIN — Medication 1 APPLICATION(S): at 05:57

## 2022-03-19 RX ADMIN — OXYCODONE HYDROCHLORIDE 10 MILLIGRAM(S): 5 TABLET ORAL at 15:12

## 2022-03-19 RX ADMIN — Medication 3 MILLILITER(S): at 04:11

## 2022-03-19 RX ADMIN — OXYCODONE HYDROCHLORIDE 10 MILLIGRAM(S): 5 TABLET ORAL at 02:16

## 2022-03-19 RX ADMIN — Medication 650 MILLIGRAM(S): at 06:28

## 2022-03-19 RX ADMIN — LOSARTAN POTASSIUM 50 MILLIGRAM(S): 100 TABLET, FILM COATED ORAL at 05:58

## 2022-03-19 RX ADMIN — Medication 15 MILLIGRAM(S): at 17:42

## 2022-03-19 RX ADMIN — DIPHENHYDRAMINE HYDROCHLORIDE AND LIDOCAINE HYDROCHLORIDE AND ALUMINUM HYDROXIDE AND MAGNESIUM HYDRO 10 MILLILITER(S): KIT at 17:59

## 2022-03-19 RX ADMIN — OXYCODONE HYDROCHLORIDE 10 MILLIGRAM(S): 5 TABLET ORAL at 19:00

## 2022-03-19 RX ADMIN — Medication 3 MILLILITER(S): at 21:08

## 2022-03-19 RX ADMIN — Medication 3 MILLILITER(S): at 09:28

## 2022-03-19 RX ADMIN — Medication 166.67 MILLIGRAM(S): at 17:58

## 2022-03-19 RX ADMIN — SODIUM CHLORIDE 75 MILLILITER(S): 9 INJECTION, SOLUTION INTRAVENOUS at 01:10

## 2022-03-19 RX ADMIN — Medication 650 MILLIGRAM(S): at 17:59

## 2022-03-19 RX ADMIN — OXYCODONE HYDROCHLORIDE 10 MILLIGRAM(S): 5 TABLET ORAL at 09:07

## 2022-03-19 RX ADMIN — Medication 650 MILLIGRAM(S): at 00:41

## 2022-03-19 RX ADMIN — MONTELUKAST 10 MILLIGRAM(S): 4 TABLET, CHEWABLE ORAL at 21:35

## 2022-03-19 RX ADMIN — DIPHENHYDRAMINE HYDROCHLORIDE AND LIDOCAINE HYDROCHLORIDE AND ALUMINUM HYDROXIDE AND MAGNESIUM HYDRO 10 MILLILITER(S): KIT at 06:07

## 2022-03-19 RX ADMIN — MEROPENEM 100 MILLIGRAM(S): 1 INJECTION INTRAVENOUS at 13:18

## 2022-03-19 RX ADMIN — TAMSULOSIN HYDROCHLORIDE 0.4 MILLIGRAM(S): 0.4 CAPSULE ORAL at 21:35

## 2022-03-19 RX ADMIN — OXYCODONE HYDROCHLORIDE 10 MILLIGRAM(S): 5 TABLET ORAL at 01:01

## 2022-03-19 RX ADMIN — HEPARIN SODIUM 7500 UNIT(S): 5000 INJECTION INTRAVENOUS; SUBCUTANEOUS at 21:35

## 2022-03-19 RX ADMIN — Medication 650 MILLIGRAM(S): at 18:12

## 2022-03-19 RX ADMIN — Medication 1 APPLICATION(S): at 12:00

## 2022-03-19 RX ADMIN — Medication 650 MILLIGRAM(S): at 05:58

## 2022-03-19 NOTE — PROGRESS NOTE ADULT - SUBJECTIVE AND OBJECTIVE BOX
Subjective: Patient was seen and evaluated at bedside. JOCELYNE. Dressing was changed at bedside and silvadine placed over necrotic tissue. Patient has no acute complaints at this time, reports scrotal and penile pain is still present but improving. Denies chest pain, SOB, abdominal pain, N/V.       STATUS POST:  I&D scrotal abscess drainage     POST OPERATIVE DAY #: 10    MEDICATIONS  (STANDING):  acetaminophen     Tablet .. 650 milliGRAM(s) Oral every 6 hours  albuterol/ipratropium for Nebulization 3 milliLiter(s) Nebulizer every 6 hours  BACItracin   Ointment 1 Application(s) Topical every 6 hours  chlorhexidine 2% Cloths 1 Application(s) Topical daily  FIRST- Mouthwash  BLM 10 milliLiter(s) Swish and Swallow two times a day  heparin   Injectable 7500 Unit(s) SubCutaneous every 8 hours  influenza  Vaccine (HIGH DOSE) 0.7 milliLiter(s) IntraMuscular once  lactated ringers. 1000 milliLiter(s) (75 mL/Hr) IV Continuous <Continuous>  losartan 50 milliGRAM(s) Oral daily  meropenem  IVPB 1000 milliGRAM(s) IV Intermittent every 8 hours  montelukast 10 milliGRAM(s) Oral daily  polyethylene glycol 3350 17 Gram(s) Oral daily  senna 2 Tablet(s) Oral at bedtime  silver sulfADIAZINE 1% Cream 1 Application(s) Topical daily  simvastatin 40 milliGRAM(s) Oral at bedtime  tamsulosin 0.4 milliGRAM(s) Oral at bedtime  vancomycin  IVPB 1250 milliGRAM(s) IV Intermittent every 12 hours    MEDICATIONS  (PRN):  acetaminophen     Tablet .. 650 milliGRAM(s) Oral every 6 hours PRN Temp greater or equal to 38C (100.4F), Mild Pain (1 - 3)  oxyCODONE    IR 5 milliGRAM(s) Oral every 4 hours PRN Moderate Pain (4 - 6)  oxyCODONE    IR 10 milliGRAM(s) Oral every 4 hours PRN Severe Pain (7 - 10)      Vital Signs Last 24 Hrs  T(C): 37.2 (19 Mar 2022 07:19), Max: 37.3 (18 Mar 2022 16:39)  T(F): 99 (19 Mar 2022 07:19), Max: 99.1 (18 Mar 2022 16:39)  HR: 95 (19 Mar 2022 09:10) (76 - 101)  BP: 123/79 (19 Mar 2022 07:19) (123/79 - 145/89)  BP(mean): --  RR: 18 (19 Mar 2022 07:19) (18 - 20)  SpO2: 97% (19 Mar 2022 09:10) (93% - 100%)    Physical Exam:    Constitutional: NAD  HEENT: PERRL, EOMI  Neck: No JVD, FROM without pain  Respiratory: Respirations non-labored, no accessory muscle use  Gastrointestinal: Soft, non-tender, non-distended. SPT in place.   Extremities: No peripheral edema, No cyanosis  Neurological: A&O x 3; without gross deficit  Musculoskeletal: No joint pain, swelling, deformity, or point tenderness; no limitation of movement  Urologic: SPT in place and draining well. Scrotal wound cleaned with betadine, packing removed, clean, wound edges demarcated, granulation tissue, silvadene, scrotum much improved.     LABS:                        9.5    13.71 )-----------( 561      ( 19 Mar 2022 07:24 )             30.8     03-19    137  |  98  |  15.1  ----------------------------<  102<H>  4.9   |  28.0  |  0.86    Ca    8.8      19 Mar 2022 07:24  Phos  3.4     03-19  Mg     2.0     03-19      65 yo male with scrotal abscess, s/p recent right orchiectomy, POD#9 s/p scrotal abscess I&D, leukocytosis slowly improved  - wound care performed  - cont abx  - trend wbc  - remains afebrile  - cont OOB, ambulate  - PT    Attestation Statements:    Attestation Statements:  Attending supervision statement: I have personally seen and examined the patient.  I fully participated in the care of this patient.  I have made amendments to the documentation where necessary, and agree with the history, physical exam, and plan as documented by the ACP.     patient examined at bedside  dressing changed  betadine inside the wound  looks improving, patient feels better  will continue monitoring as discussed .      A:     Plan:   - Encourage ambulation    Subjective: Patient was seen and evaluated at bedside. JOCELYNE. Dressing was changed at bedside and silvadine placed over necrotic tissue. Patient has no acute complaints at this time, reports scrotal and penile pain is still present but improving. Denies chest pain, SOB, abdominal pain, N/V.   Scrotal wound cleaned with betadine, packing removed, clean, wound edges demarcated, granulation tissue, silvadene, scrotum much improved.     STATUS POST:  I&D scrotal abscess drainage     POST OPERATIVE DAY #: 10    MEDICATIONS  (STANDING):  acetaminophen     Tablet .. 650 milliGRAM(s) Oral every 6 hours  albuterol/ipratropium for Nebulization 3 milliLiter(s) Nebulizer every 6 hours  BACItracin   Ointment 1 Application(s) Topical every 6 hours  chlorhexidine 2% Cloths 1 Application(s) Topical daily  FIRST- Mouthwash  BLM 10 milliLiter(s) Swish and Swallow two times a day  heparin   Injectable 7500 Unit(s) SubCutaneous every 8 hours  influenza  Vaccine (HIGH DOSE) 0.7 milliLiter(s) IntraMuscular once  lactated ringers. 1000 milliLiter(s) (75 mL/Hr) IV Continuous <Continuous>  losartan 50 milliGRAM(s) Oral daily  meropenem  IVPB 1000 milliGRAM(s) IV Intermittent every 8 hours  montelukast 10 milliGRAM(s) Oral daily  polyethylene glycol 3350 17 Gram(s) Oral daily  senna 2 Tablet(s) Oral at bedtime  silver sulfADIAZINE 1% Cream 1 Application(s) Topical daily  simvastatin 40 milliGRAM(s) Oral at bedtime  tamsulosin 0.4 milliGRAM(s) Oral at bedtime  vancomycin  IVPB 1250 milliGRAM(s) IV Intermittent every 12 hours    MEDICATIONS  (PRN):  acetaminophen     Tablet .. 650 milliGRAM(s) Oral every 6 hours PRN Temp greater or equal to 38C (100.4F), Mild Pain (1 - 3)  oxyCODONE    IR 5 milliGRAM(s) Oral every 4 hours PRN Moderate Pain (4 - 6)  oxyCODONE    IR 10 milliGRAM(s) Oral every 4 hours PRN Severe Pain (7 - 10)      Vital Signs Last 24 Hrs  T(C): 37.2 (19 Mar 2022 07:19), Max: 37.3 (18 Mar 2022 16:39)  T(F): 99 (19 Mar 2022 07:19), Max: 99.1 (18 Mar 2022 16:39)  HR: 95 (19 Mar 2022 09:10) (76 - 101)  BP: 123/79 (19 Mar 2022 07:19) (123/79 - 145/89)  BP(mean): --  RR: 18 (19 Mar 2022 07:19) (18 - 20)  SpO2: 97% (19 Mar 2022 09:10) (93% - 100%)    Physical Exam:    Constitutional: NAD  HEENT: PERRL, EOMI  Neck: No JVD, FROM without pain  Respiratory: Respirations non-labored, no accessory muscle use  Gastrointestinal: Soft, non-tender, non-distended. SPT in place.   Extremities: No peripheral edema, No cyanosis  Neurological: A&O x 3; without gross deficit  Musculoskeletal: No joint pain, swelling, deformity, or point tenderness; no limitation of movement  Urologic: SPT in place and draining well. Scrotal swelling and tenderness improved     LABS:                        9.5    13.71 )-----------( 561      ( 19 Mar 2022 07:24 )             30.8     03-19    137  |  98  |  15.1  ----------------------------<  102<H>  4.9   |  28.0  |  0.86    Ca    8.8      19 Mar 2022 07:24  Phos  3.4     03-19  Mg     2.0     03-19        A: Patient is a 65 yo M with scrotal abscess s/p recent right orchiectomy, POD#10 s/p scortal abscess I&D.     Plan:   - Encourage ambulation   - Continue daily wound care, dressing changes, silvadene to necrosis  - Encourage ambulation OOB   - Continue abx   - Continue trending labs

## 2022-03-20 LAB
ANION GAP SERPL CALC-SCNC: 8 MMOL/L — SIGNIFICANT CHANGE UP (ref 5–17)
ANION GAP SERPL CALC-SCNC: 8 MMOL/L — SIGNIFICANT CHANGE UP (ref 5–17)
ANISOCYTOSIS BLD QL: SIGNIFICANT CHANGE UP
BASO STIPL BLD QL SMEAR: PRESENT — SIGNIFICANT CHANGE UP
BASOPHILS # BLD AUTO: 0 K/UL — SIGNIFICANT CHANGE UP (ref 0–0.2)
BASOPHILS NFR BLD AUTO: 0 % — SIGNIFICANT CHANGE UP (ref 0–2)
BUN SERPL-MCNC: 15.9 MG/DL — SIGNIFICANT CHANGE UP (ref 8–20)
BUN SERPL-MCNC: 18.4 MG/DL — SIGNIFICANT CHANGE UP (ref 8–20)
CALCIUM SERPL-MCNC: 8.5 MG/DL — LOW (ref 8.6–10.2)
CALCIUM SERPL-MCNC: 8.7 MG/DL — SIGNIFICANT CHANGE UP (ref 8.6–10.2)
CHLORIDE SERPL-SCNC: 102 MMOL/L — SIGNIFICANT CHANGE UP (ref 98–107)
CHLORIDE SERPL-SCNC: 97 MMOL/L — LOW (ref 98–107)
CO2 SERPL-SCNC: 29 MMOL/L — SIGNIFICANT CHANGE UP (ref 22–29)
CO2 SERPL-SCNC: 30 MMOL/L — HIGH (ref 22–29)
CREAT SERPL-MCNC: 0.82 MG/DL — SIGNIFICANT CHANGE UP (ref 0.5–1.3)
CREAT SERPL-MCNC: 0.86 MG/DL — SIGNIFICANT CHANGE UP (ref 0.5–1.3)
EGFR: 94 ML/MIN/1.73M2 — SIGNIFICANT CHANGE UP
EGFR: 96 ML/MIN/1.73M2 — SIGNIFICANT CHANGE UP
EOSINOPHIL # BLD AUTO: 0.3 K/UL — SIGNIFICANT CHANGE UP (ref 0–0.5)
EOSINOPHIL NFR BLD AUTO: 2.6 % — SIGNIFICANT CHANGE UP (ref 0–6)
GIANT PLATELETS BLD QL SMEAR: PRESENT — SIGNIFICANT CHANGE UP
GLUCOSE SERPL-MCNC: 101 MG/DL — HIGH (ref 70–99)
GLUCOSE SERPL-MCNC: 99 MG/DL — SIGNIFICANT CHANGE UP (ref 70–99)
HCT VFR BLD CALC: 30.1 % — LOW (ref 39–50)
HGB BLD-MCNC: 9.2 G/DL — LOW (ref 13–17)
HYPOCHROMIA BLD QL: SLIGHT — SIGNIFICANT CHANGE UP
LYMPHOCYTES # BLD AUTO: 19.5 % — SIGNIFICANT CHANGE UP (ref 13–44)
LYMPHOCYTES # BLD AUTO: 2.23 K/UL — SIGNIFICANT CHANGE UP (ref 1–3.3)
MACROCYTES BLD QL: SIGNIFICANT CHANGE UP
MAGNESIUM SERPL-MCNC: 2.1 MG/DL — SIGNIFICANT CHANGE UP (ref 1.8–2.6)
MANUAL SMEAR VERIFICATION: SIGNIFICANT CHANGE UP
MCHC RBC-ENTMCNC: 30.6 GM/DL — LOW (ref 32–36)
MCHC RBC-ENTMCNC: 32.4 PG — SIGNIFICANT CHANGE UP (ref 27–34)
MCV RBC AUTO: 106 FL — HIGH (ref 80–100)
MONOCYTES # BLD AUTO: 0.31 K/UL — SIGNIFICANT CHANGE UP (ref 0–0.9)
MONOCYTES NFR BLD AUTO: 2.7 % — SIGNIFICANT CHANGE UP (ref 2–14)
NEUTROPHILS # BLD AUTO: 8.09 K/UL — HIGH (ref 1.8–7.4)
NEUTROPHILS NFR BLD AUTO: 70.8 % — SIGNIFICANT CHANGE UP (ref 43–77)
PHOSPHATE SERPL-MCNC: 3.8 MG/DL — SIGNIFICANT CHANGE UP (ref 2.4–4.7)
PLAT MORPH BLD: NORMAL — SIGNIFICANT CHANGE UP
PLATELET # BLD AUTO: 621 K/UL — HIGH (ref 150–400)
POIKILOCYTOSIS BLD QL AUTO: SLIGHT — SIGNIFICANT CHANGE UP
POLYCHROMASIA BLD QL SMEAR: SLIGHT — SIGNIFICANT CHANGE UP
POTASSIUM SERPL-MCNC: 4.9 MMOL/L — SIGNIFICANT CHANGE UP (ref 3.5–5.3)
POTASSIUM SERPL-MCNC: 5.7 MMOL/L — HIGH (ref 3.5–5.3)
POTASSIUM SERPL-SCNC: 4.9 MMOL/L — SIGNIFICANT CHANGE UP (ref 3.5–5.3)
POTASSIUM SERPL-SCNC: 5.7 MMOL/L — HIGH (ref 3.5–5.3)
RBC # BLD: 2.84 M/UL — LOW (ref 4.2–5.8)
RBC # FLD: 14.7 % — HIGH (ref 10.3–14.5)
RBC BLD AUTO: ABNORMAL
SMUDGE CELLS # BLD: PRESENT — SIGNIFICANT CHANGE UP
SODIUM SERPL-SCNC: 134 MMOL/L — LOW (ref 135–145)
SODIUM SERPL-SCNC: 140 MMOL/L — SIGNIFICANT CHANGE UP (ref 135–145)
STOMATOCYTES BLD QL SMEAR: SLIGHT — SIGNIFICANT CHANGE UP
VANCOMYCIN TROUGH SERPL-MCNC: 17.5 UG/ML — SIGNIFICANT CHANGE UP (ref 10–20)
VARIANT LYMPHS # BLD: 4.4 % — SIGNIFICANT CHANGE UP (ref 0–6)
WBC # BLD: 11.42 K/UL — HIGH (ref 3.8–10.5)
WBC # FLD AUTO: 11.42 K/UL — HIGH (ref 3.8–10.5)

## 2022-03-20 PROCEDURE — 99231 SBSQ HOSP IP/OBS SF/LOW 25: CPT

## 2022-03-20 RX ADMIN — Medication 650 MILLIGRAM(S): at 05:06

## 2022-03-20 RX ADMIN — MEROPENEM 100 MILLIGRAM(S): 1 INJECTION INTRAVENOUS at 05:05

## 2022-03-20 RX ADMIN — Medication 650 MILLIGRAM(S): at 12:08

## 2022-03-20 RX ADMIN — Medication 166.67 MILLIGRAM(S): at 18:02

## 2022-03-20 RX ADMIN — HEPARIN SODIUM 7500 UNIT(S): 5000 INJECTION INTRAVENOUS; SUBCUTANEOUS at 13:28

## 2022-03-20 RX ADMIN — Medication 650 MILLIGRAM(S): at 18:02

## 2022-03-20 RX ADMIN — HEPARIN SODIUM 7500 UNIT(S): 5000 INJECTION INTRAVENOUS; SUBCUTANEOUS at 21:12

## 2022-03-20 RX ADMIN — Medication 650 MILLIGRAM(S): at 05:45

## 2022-03-20 RX ADMIN — Medication 3 MILLILITER(S): at 15:26

## 2022-03-20 RX ADMIN — Medication 3 MILLILITER(S): at 21:15

## 2022-03-20 RX ADMIN — CHLORHEXIDINE GLUCONATE 1 APPLICATION(S): 213 SOLUTION TOPICAL at 12:07

## 2022-03-20 RX ADMIN — MEROPENEM 100 MILLIGRAM(S): 1 INJECTION INTRAVENOUS at 13:28

## 2022-03-20 RX ADMIN — OXYCODONE HYDROCHLORIDE 10 MILLIGRAM(S): 5 TABLET ORAL at 21:13

## 2022-03-20 RX ADMIN — OXYCODONE HYDROCHLORIDE 10 MILLIGRAM(S): 5 TABLET ORAL at 03:20

## 2022-03-20 RX ADMIN — MONTELUKAST 10 MILLIGRAM(S): 4 TABLET, CHEWABLE ORAL at 12:08

## 2022-03-20 RX ADMIN — Medication 166.67 MILLIGRAM(S): at 06:11

## 2022-03-20 RX ADMIN — OXYCODONE HYDROCHLORIDE 10 MILLIGRAM(S): 5 TABLET ORAL at 13:55

## 2022-03-20 RX ADMIN — OXYCODONE HYDROCHLORIDE 10 MILLIGRAM(S): 5 TABLET ORAL at 02:47

## 2022-03-20 RX ADMIN — Medication 1 APPLICATION(S): at 05:06

## 2022-03-20 RX ADMIN — Medication 1 APPLICATION(S): at 12:07

## 2022-03-20 RX ADMIN — SIMVASTATIN 40 MILLIGRAM(S): 20 TABLET, FILM COATED ORAL at 21:12

## 2022-03-20 RX ADMIN — Medication 3 MILLILITER(S): at 09:00

## 2022-03-20 RX ADMIN — HEPARIN SODIUM 7500 UNIT(S): 5000 INJECTION INTRAVENOUS; SUBCUTANEOUS at 05:06

## 2022-03-20 RX ADMIN — Medication 650 MILLIGRAM(S): at 18:51

## 2022-03-20 RX ADMIN — TAMSULOSIN HYDROCHLORIDE 0.4 MILLIGRAM(S): 0.4 CAPSULE ORAL at 21:13

## 2022-03-20 RX ADMIN — MEROPENEM 100 MILLIGRAM(S): 1 INJECTION INTRAVENOUS at 21:13

## 2022-03-20 RX ADMIN — OXYCODONE HYDROCHLORIDE 10 MILLIGRAM(S): 5 TABLET ORAL at 14:25

## 2022-03-20 RX ADMIN — Medication 3 MILLILITER(S): at 04:02

## 2022-03-20 RX ADMIN — OXYCODONE HYDROCHLORIDE 10 MILLIGRAM(S): 5 TABLET ORAL at 22:23

## 2022-03-20 RX ADMIN — Medication 650 MILLIGRAM(S): at 12:53

## 2022-03-20 RX ADMIN — OXYCODONE HYDROCHLORIDE 10 MILLIGRAM(S): 5 TABLET ORAL at 09:51

## 2022-03-20 RX ADMIN — DIPHENHYDRAMINE HYDROCHLORIDE AND LIDOCAINE HYDROCHLORIDE AND ALUMINUM HYDROXIDE AND MAGNESIUM HYDRO 10 MILLILITER(S): KIT at 18:03

## 2022-03-20 RX ADMIN — OXYCODONE HYDROCHLORIDE 10 MILLIGRAM(S): 5 TABLET ORAL at 09:05

## 2022-03-20 RX ADMIN — LOSARTAN POTASSIUM 50 MILLIGRAM(S): 100 TABLET, FILM COATED ORAL at 05:06

## 2022-03-20 RX ADMIN — SENNA PLUS 2 TABLET(S): 8.6 TABLET ORAL at 21:12

## 2022-03-20 RX ADMIN — DIPHENHYDRAMINE HYDROCHLORIDE AND LIDOCAINE HYDROCHLORIDE AND ALUMINUM HYDROXIDE AND MAGNESIUM HYDRO 10 MILLILITER(S): KIT at 05:07

## 2022-03-20 RX ADMIN — Medication 1 APPLICATION(S): at 18:03

## 2022-03-20 NOTE — PROGRESS NOTE ADULT - SUBJECTIVE AND OBJECTIVE BOX
Subjective: Patient was seen and evaluated at bedside. JOCELYNE. Patient has no acute complaints at this time, reports scrotal and penile pain is still present but improving. Scrotal wound cleaned with betadine, packing removed, clean, wound edges demarcated, granulation tissue, silvadene, scrotum much improved.   Denies chest pain, palpitations, SOB, abdominal pain, N/V.     STATUS POST:  I&D scrotal abscess drainage     POST OPERATIVE DAY #: 11    MEDICATIONS  (STANDING):  acetaminophen     Tablet .. 650 milliGRAM(s) Oral every 6 hours  albuterol/ipratropium for Nebulization 3 milliLiter(s) Nebulizer every 6 hours  BACItracin   Ointment 1 Application(s) Topical every 6 hours  chlorhexidine 2% Cloths 1 Application(s) Topical daily  FIRST- Mouthwash  BLM 10 milliLiter(s) Swish and Swallow two times a day  heparin   Injectable 7500 Unit(s) SubCutaneous every 8 hours  influenza  Vaccine (HIGH DOSE) 0.7 milliLiter(s) IntraMuscular once  lactated ringers. 1000 milliLiter(s) (75 mL/Hr) IV Continuous <Continuous>  losartan 50 milliGRAM(s) Oral daily  meropenem  IVPB 1000 milliGRAM(s) IV Intermittent every 8 hours  montelukast 10 milliGRAM(s) Oral daily  polyethylene glycol 3350 17 Gram(s) Oral daily  senna 2 Tablet(s) Oral at bedtime  silver sulfADIAZINE 1% Cream 1 Application(s) Topical daily  simvastatin 40 milliGRAM(s) Oral at bedtime  tamsulosin 0.4 milliGRAM(s) Oral at bedtime  vancomycin  IVPB 1250 milliGRAM(s) IV Intermittent every 12 hours    MEDICATIONS  (PRN):  acetaminophen     Tablet .. 650 milliGRAM(s) Oral every 6 hours PRN Temp greater or equal to 38C (100.4F), Mild Pain (1 - 3)  oxyCODONE    IR 5 milliGRAM(s) Oral every 4 hours PRN Moderate Pain (4 - 6)  oxyCODONE    IR 10 milliGRAM(s) Oral every 4 hours PRN Severe Pain (7 - 10)    Vital Signs Last 24 Hrs  T(C): 37.2 (19 Mar 2022 07:19), Max: 37.3 (18 Mar 2022 16:39)  T(F): 99 (19 Mar 2022 07:19), Max: 99.1 (18 Mar 2022 16:39)  HR: 95 (19 Mar 2022 09:10) (76 - 101)  BP: 123/79 (19 Mar 2022 07:19) (123/79 - 145/89)  BP(mean): --  RR: 18 (19 Mar 2022 07:19) (18 - 20)  SpO2: 97% (19 Mar 2022 09:10) (93% - 100%)    Physical Exam:    Constitutional: NAD  HEENT: PERRL, EOMI  Neck: No JVD, FROM without pain  Respiratory: Respirations non-labored, no accessory muscle use  Gastrointestinal: Soft, non-tender, non-distended. SPT in place.   Extremities: No peripheral edema, No cyanosis  Neurological: A&O x 3; without gross deficit  Musculoskeletal: No joint pain, swelling, deformity, or point tenderness; no limitation of movement  Urologic: SPT in place and draining well. Scrotal swelling improved     LABS:                        9.5    13.71 )-----------( 561      ( 19 Mar 2022 07:24 )             30.8     03-19    137  |  98  |  15.1  ----------------------------<  102<H>  4.9   |  28.0  |  0.86    Ca    8.8      19 Mar 2022 07:24  Phos  3.4     03-19  Mg     2.0     03-19    A: Patient is a 65 yo M with scrotal abscess s/p recent right orchiectomy, POD#11 s/p scortal abscess I&D.     Plan:   - Continue to elevate scrotum by placing a rolled towel under scrotum   - Continue daily wound care, dressing changes, silvadene to necrosis  - Encourage ambulation OOB   - Continue antibiotics   - Continue trending labs, repeat K done this afternoon, has normalized

## 2022-03-21 LAB
ANION GAP SERPL CALC-SCNC: 10 MMOL/L — SIGNIFICANT CHANGE UP (ref 5–17)
BUN SERPL-MCNC: 16.4 MG/DL — SIGNIFICANT CHANGE UP (ref 8–20)
CALCIUM SERPL-MCNC: 9.1 MG/DL — SIGNIFICANT CHANGE UP (ref 8.6–10.2)
CHLORIDE SERPL-SCNC: 98 MMOL/L — SIGNIFICANT CHANGE UP (ref 98–107)
CO2 SERPL-SCNC: 29 MMOL/L — SIGNIFICANT CHANGE UP (ref 22–29)
CREAT SERPL-MCNC: 0.87 MG/DL — SIGNIFICANT CHANGE UP (ref 0.5–1.3)
EGFR: 94 ML/MIN/1.73M2 — SIGNIFICANT CHANGE UP
GLUCOSE SERPL-MCNC: 103 MG/DL — HIGH (ref 70–99)
HCT VFR BLD CALC: 31.7 % — LOW (ref 39–50)
HGB BLD-MCNC: 9.9 G/DL — LOW (ref 13–17)
MCHC RBC-ENTMCNC: 31.2 GM/DL — LOW (ref 32–36)
MCHC RBC-ENTMCNC: 32.8 PG — SIGNIFICANT CHANGE UP (ref 27–34)
MCV RBC AUTO: 105 FL — HIGH (ref 80–100)
PLATELET # BLD AUTO: 610 K/UL — HIGH (ref 150–400)
POTASSIUM SERPL-MCNC: 5.1 MMOL/L — SIGNIFICANT CHANGE UP (ref 3.5–5.3)
POTASSIUM SERPL-SCNC: 5.1 MMOL/L — SIGNIFICANT CHANGE UP (ref 3.5–5.3)
RBC # BLD: 3.02 M/UL — LOW (ref 4.2–5.8)
RBC # FLD: 14.6 % — HIGH (ref 10.3–14.5)
SARS-COV-2 RNA SPEC QL NAA+PROBE: SIGNIFICANT CHANGE UP
SODIUM SERPL-SCNC: 137 MMOL/L — SIGNIFICANT CHANGE UP (ref 135–145)
WBC # BLD: 9.42 K/UL — SIGNIFICANT CHANGE UP (ref 3.8–10.5)
WBC # FLD AUTO: 9.42 K/UL — SIGNIFICANT CHANGE UP (ref 3.8–10.5)

## 2022-03-21 PROCEDURE — 99232 SBSQ HOSP IP/OBS MODERATE 35: CPT

## 2022-03-21 RX ADMIN — Medication 166.67 MILLIGRAM(S): at 17:49

## 2022-03-21 RX ADMIN — Medication 3 MILLILITER(S): at 09:38

## 2022-03-21 RX ADMIN — TAMSULOSIN HYDROCHLORIDE 0.4 MILLIGRAM(S): 0.4 CAPSULE ORAL at 21:01

## 2022-03-21 RX ADMIN — HEPARIN SODIUM 7500 UNIT(S): 5000 INJECTION INTRAVENOUS; SUBCUTANEOUS at 05:08

## 2022-03-21 RX ADMIN — Medication 650 MILLIGRAM(S): at 05:38

## 2022-03-21 RX ADMIN — Medication 650 MILLIGRAM(S): at 00:31

## 2022-03-21 RX ADMIN — DIPHENHYDRAMINE HYDROCHLORIDE AND LIDOCAINE HYDROCHLORIDE AND ALUMINUM HYDROXIDE AND MAGNESIUM HYDRO 10 MILLILITER(S): KIT at 17:48

## 2022-03-21 RX ADMIN — Medication 650 MILLIGRAM(S): at 18:06

## 2022-03-21 RX ADMIN — Medication 650 MILLIGRAM(S): at 17:49

## 2022-03-21 RX ADMIN — OXYCODONE HYDROCHLORIDE 10 MILLIGRAM(S): 5 TABLET ORAL at 19:25

## 2022-03-21 RX ADMIN — Medication 650 MILLIGRAM(S): at 10:51

## 2022-03-21 RX ADMIN — Medication 1 APPLICATION(S): at 05:08

## 2022-03-21 RX ADMIN — Medication 1 APPLICATION(S): at 07:46

## 2022-03-21 RX ADMIN — Medication 3 MILLILITER(S): at 05:08

## 2022-03-21 RX ADMIN — CHLORHEXIDINE GLUCONATE 1 APPLICATION(S): 213 SOLUTION TOPICAL at 07:46

## 2022-03-21 RX ADMIN — Medication 3 MILLILITER(S): at 16:45

## 2022-03-21 RX ADMIN — Medication 3 MILLILITER(S): at 21:23

## 2022-03-21 RX ADMIN — Medication 650 MILLIGRAM(S): at 09:27

## 2022-03-21 RX ADMIN — MONTELUKAST 10 MILLIGRAM(S): 4 TABLET, CHEWABLE ORAL at 09:26

## 2022-03-21 RX ADMIN — Medication 1 APPLICATION(S): at 00:15

## 2022-03-21 RX ADMIN — Medication 1 APPLICATION(S): at 10:35

## 2022-03-21 RX ADMIN — POLYETHYLENE GLYCOL 3350 17 GRAM(S): 17 POWDER, FOR SOLUTION ORAL at 09:27

## 2022-03-21 RX ADMIN — LOSARTAN POTASSIUM 50 MILLIGRAM(S): 100 TABLET, FILM COATED ORAL at 05:14

## 2022-03-21 RX ADMIN — HEPARIN SODIUM 7500 UNIT(S): 5000 INJECTION INTRAVENOUS; SUBCUTANEOUS at 21:01

## 2022-03-21 RX ADMIN — Medication 166.67 MILLIGRAM(S): at 05:14

## 2022-03-21 RX ADMIN — HEPARIN SODIUM 7500 UNIT(S): 5000 INJECTION INTRAVENOUS; SUBCUTANEOUS at 13:09

## 2022-03-21 RX ADMIN — MEROPENEM 100 MILLIGRAM(S): 1 INJECTION INTRAVENOUS at 05:09

## 2022-03-21 RX ADMIN — Medication 650 MILLIGRAM(S): at 10:14

## 2022-03-21 RX ADMIN — SENNA PLUS 2 TABLET(S): 8.6 TABLET ORAL at 21:00

## 2022-03-21 RX ADMIN — OXYCODONE HYDROCHLORIDE 10 MILLIGRAM(S): 5 TABLET ORAL at 20:00

## 2022-03-21 RX ADMIN — MEROPENEM 100 MILLIGRAM(S): 1 INJECTION INTRAVENOUS at 13:09

## 2022-03-21 RX ADMIN — Medication 1 APPLICATION(S): at 07:47

## 2022-03-21 RX ADMIN — OXYCODONE HYDROCHLORIDE 10 MILLIGRAM(S): 5 TABLET ORAL at 15:04

## 2022-03-21 RX ADMIN — Medication 650 MILLIGRAM(S): at 01:30

## 2022-03-21 RX ADMIN — OXYCODONE HYDROCHLORIDE 10 MILLIGRAM(S): 5 TABLET ORAL at 11:49

## 2022-03-21 RX ADMIN — Medication 1 APPLICATION(S): at 17:49

## 2022-03-21 RX ADMIN — OXYCODONE HYDROCHLORIDE 10 MILLIGRAM(S): 5 TABLET ORAL at 10:51

## 2022-03-21 RX ADMIN — DIPHENHYDRAMINE HYDROCHLORIDE AND LIDOCAINE HYDROCHLORIDE AND ALUMINUM HYDROXIDE AND MAGNESIUM HYDRO 10 MILLILITER(S): KIT at 05:14

## 2022-03-21 RX ADMIN — Medication 650 MILLIGRAM(S): at 11:49

## 2022-03-21 RX ADMIN — Medication 650 MILLIGRAM(S): at 05:08

## 2022-03-21 RX ADMIN — OXYCODONE HYDROCHLORIDE 10 MILLIGRAM(S): 5 TABLET ORAL at 15:57

## 2022-03-21 RX ADMIN — SIMVASTATIN 40 MILLIGRAM(S): 20 TABLET, FILM COATED ORAL at 21:01

## 2022-03-21 NOTE — PROGRESS NOTE ADULT - ASSESSMENT
67 yo male with scrotal abscess, s/p I&D 3/9/22, SPT 3/16/22, leukocytosis, thrombocytosis  - leukocytosis now resolved  - platelets improving  - cont OOB to chair and ambulation  - cont abx  - will cont wound care daily  - will need home care for scrotal wound once ready for d/c

## 2022-03-21 NOTE — PROGRESS NOTE ADULT - ASSESSMENT
Patient is a 65 y/o male s/p right orchiectomy on 3/1, admitted 3/8/22     scrotal abscess. Now s/p I&D of scrotal absces 3/10   AND PLACED ON IV ABX   PT  WITH I  LEUKOCYTOSIS   PT WITH SERRATIA /MRSA AND BACTEROIDES    ON  MERREM/ VANCO FOR  BROADER COVERAGE   WBC DOWN TO  9.4   S/P SP CATHETER PLACEMENT BY IR      HIV NEGATIVE   CONTINUE CURRENT ABX  OVERALL IMPROVED DECREASED PENILE EDEMA   WILL FOLLOWUP  WILL D/W

## 2022-03-21 NOTE — PROGRESS NOTE ADULT - SUBJECTIVE AND OBJECTIVE BOX
Subjective:66yMale POD#12 s/p I&D right scrotal abscess.  Pt feels better, has been getting OOB with PT and ambulating. SPT placed 3/16, draining well.     SPT: yellow    Vital Signs Last 24 Hrs  T(C): 37.6 (21 Mar 2022 08:37), Max: 37.6 (21 Mar 2022 08:37)  T(F): 99.6 (21 Mar 2022 08:37), Max: 99.6 (21 Mar 2022 08:37)  HR: 87 (21 Mar 2022 08:37) (77 - 90)  BP: 121/81 (21 Mar 2022 08:37) (111/70 - 124/78)  BP(mean): --  RR: 18 (21 Mar 2022 05:20) (18 - 19)  SpO2: 93% (21 Mar 2022 08:37) (93% - 97%)  I&O's Detail    20 Mar 2022 07:01  -  21 Mar 2022 07:00  --------------------------------------------------------  IN:  Total IN: 0 mL    OUT:    Indwelling Catheter - Suprapubic (mL): 1700 mL  Total OUT: 1700 mL    Total NET: -1700 mL          Labs:                        9.9    9.42  )-----------( 610      ( 21 Mar 2022 09:07 )             31.7     03-21    137  |  98  |  16.4  ----------------------------<  103<H>  5.1   |  29.0  |  0.87    Ca    9.1      21 Mar 2022 09:07  Phos  3.8     03-20  Mg     2.1     03-20

## 2022-03-21 NOTE — PROGRESS NOTE ADULT - ATTENDING COMMENTS
Gen:  Comfortable  :  More swelling noted in penis this am, wheeler in place, tense, swollen tender and enlarged scrotum. No drainage noted. Small area of superficial skin avulsion on scrotum    As above:  66 year old gentleman w/hx of HTN, obesity, "mild" COPD and s/p scrotal abscess drainage w/orchiectomy on 3/1 (outpatient) who presents (3/8/22) with increasing pain and swelling of scrotum, and chills (temperature unmeasured).  Septic shock -now resolved.  SAVANNA-improving.  Plan for OR today w/urology.
labs improving  wound improving  patient feeling better  no change in plan
Gen:  Comfortable, sitting in chair  :  Decreased swelling noted in scrotum and penis this am, wheeler in place, scrotum w/superficial sloughing of skin. Purulent drainage noted around penrose.     As above:  66 year old gentleman w/hx of HTN, obesity, "mild" COPD and s/p scrotal abscess drainage w/orchiectomy on 3/1 (outpatient) who presents (3/8/22) with increasing pain and swelling of scrotum, and chills (temperature unmeasured).  Septic shock. SAVANNA.  S/P I&D 3/9/22.    Septic shock - resolved  SAVANNA - continues to improve  D/C TLC  Wean O2 (does not wear O2 at home)  Bowel regimen  OOB/Ambulate  Continue w/antibiotics (urine culture negative to date - OR cultures pending)  Flomax (home med) and continue w/wheeler given degree of penile swelling  Vasoline gauze/bacitracin to scrotum    No acute critical care issues.  Will transfer from critical care at this time.
agree with assessment  patient improved  labs improving  will continue treatment
agree with plan  patient examined bedside  wound and dressing examined by me
agree with assessment  discussed plan with patient  patient stable and alert  procedure today
patient examined at bedside  dressing changed  betadine inside the wound  looks improving, patient feels better  will continue monitoring as discussed
agree with assessment  discussed plan with patient and family  will update on results
agree with assessment  patient examined at bedside  will continue follow up
patient continues to improve  will continue wound care and monitoring
reviewed all images and labs  discussed plan with patient and family  the finding on CT is not impressive, and if not necessary would rather not dissect more, as any tissue in the patient does not heal easily  currently improving, feeling much better and labs improved for the first time  patient feels more comfortable with the SPT    will keep monitoring for now  and if he does not continue to improve will schedule a second i&d
agree with assessment  reviewed the CT  no need for I&D  will confirm ID recommendations and monitor

## 2022-03-21 NOTE — PROGRESS NOTE ADULT - SUBJECTIVE AND OBJECTIVE BOX
INFECTIOUS DISEASES AND INTERNAL MEDICINE at [facility]  =======================================================  Eliud Jarvis MD  Diplomates American Board of Internal Medicine and Infectious Diseases  Telephone [phone]  Fax            [phone]  =======================================================    HENRIQUE AMAYA [id number]    Follow up: LEUKOCYTOSIS SCROTAL INFECTION     Allergies:  penicillins (Unknown)      Medications:  acetaminophen     Tablet .. 650 milliGRAM(s) Oral every 6 hours PRN  acetaminophen     Tablet .. 650 milliGRAM(s) Oral every 6 hours  albuterol/ipratropium for Nebulization 3 milliLiter(s) Nebulizer every 6 hours  BACItracin   Ointment 1 Application(s) Topical every 6 hours  chlorhexidine 2% Cloths 1 Application(s) Topical daily  heparin   Injectable 7500 Unit(s) SubCutaneous every 8 hours  influenza  Vaccine (HIGH DOSE) 0.7 milliLiter(s) IntraMuscular once  lactated ringers. 1000 milliLiter(s) IV Continuous <Continuous>  losartan 25 milliGRAM(s) Oral daily  meropenem  IVPB 1000 milliGRAM(s) IV Intermittent every 8 hours  montelukast 10 milliGRAM(s) Oral daily  oxyCODONE    IR 5 milliGRAM(s) Oral every 4 hours PRN  oxyCODONE    IR 10 milliGRAM(s) Oral every 4 hours PRN  polyethylene glycol 3350 17 Gram(s) Oral daily  senna 2 Tablet(s) Oral at bedtime  silver sulfADIAZINE 1% Cream 1 Application(s) Topical daily  simvastatin 40 milliGRAM(s) Oral at bedtime  tamsulosin 0.4 milliGRAM(s) Oral at bedtime  vancomycin  IVPB 1000 milliGRAM(s) IV Intermittent every 12 hours    SOCIAL       FAMILY   FAMILY HISTORY:  Family history not known due to adoption (Father, Mother)      REVIEW OF SYSTEMS:  CONSTITUTIONAL:  No Fever or chills  HEENT:   No diplopia or blurred vision.  No earache, sore throat or runny nose.  CARDIOVASCULAR:  No pressure, squeezing, strangling, tightness, heaviness or aching about the chest, neck, axilla or epigastrium.  RESPIRATORY:    cough, shortness of breath,   GASTROINTESTINAL:  No nausea, vomiting or diarrhea.  GENITOURINARY:  AS PER HPI   MUSCULOSKELETAL:   moves all joints  SKIN:  No change in skin, hair or nails.  NEUROLOGIC:  No paresthesias, fasciculations, seizures or weakness.  PSYCHIATRIC:  No disorder of thought or mood.  ENDOCRINE:  No heat or cold intolerance, polyuria or polydipsia.  HEMATOLOGICAL:  No easy bruising or bleeding.            Physical Exam:  I  Vital Signs Last 24 Hrs  T(C): 37 (21 Mar 2022 16:29), Max: 37.6 (21 Mar 2022 08:37)  T(F): 98.6 (21 Mar 2022 16:29), Max: 99.6 (21 Mar 2022 08:37)  HR: 114 (21 Mar 2022 16:29) (77 - 114)  BP: 127/79 (21 Mar 2022 16:29) (120/72 - 127/79)  BP(mean): --  RR: 20 (21 Mar 2022 16:29) (18 - 20)  SpO2: 90% (21 Mar 2022 16:29) (90% - 97%)    GEN: NAD,   HEENT: normocephalic and atraumatic. EOMI. JADE.    NECK: Supple. No carotid bruits.  No lymphadenopathy or thyromegaly.  LUNGS: Clear to auscultation.  HEART: Regular rate and rhythm without murmur.  ABDOMEN: Soft, nontender, and nondistended.  Positive bowel sounds.    :   DECREASED PENILE EDEMA AND ERYTHEMA  SCROTAL WOUND DRESSED SUPRAPUBIC CATHETER IN PLACE  EXTREMITIES: Without any cyanosis, clubbing, rash, lesions or edema.  MSK: no joint swelling  NEUROLOGIC: Cranial nerves II through XII are grossly intact.  PSYCHIATRIC: Appropriate affect .  SKIN: No ulceration or induration present.        Labs:     =======================================================  Current Antibiotics:  meropenem  IVPB 1000 milliGRAM(s) IV Intermittent every 8 hours  vancomycin  IVPB 1000 milliGRAM(s) IV Intermittent every 12 hours    Other medications:  acetaminophen     Tablet .. 650 milliGRAM(s) Oral every 6 hours  albuterol/ipratropium for Nebulization 3 milliLiter(s) Nebulizer every 6 hours  BACItracin   Ointment 1 Application(s) Topical every 6 hours  chlorhexidine 2% Cloths 1 Application(s) Topical daily  heparin   Injectable 7500 Unit(s) SubCutaneous every 8 hours  influenza  Vaccine (HIGH DOSE) 0.7 milliLiter(s) IntraMuscular once  lactated ringers. 1000 milliLiter(s) IV Continuous <Continuous>  losartan 25 milliGRAM(s) Oral daily  montelukast 10 milliGRAM(s) Oral daily  polyethylene glycol 3350 17 Gram(s) Oral daily  senna 2 Tablet(s) Oral at bedtime  silver sulfADIAZINE 1% Cream 1 Application(s) Topical daily  simvastatin 40 milliGRAM(s) Oral at bedtime  tamsulosin 0.4 milliGRAM(s) Oral at bedtime      =======================================================  Labs:                                                                       9.9    9.42  )-----------( 610      ( 21 Mar 2022 09:07 )             31.7   03-21    137  |  98  |  16.4  ----------------------------<  103<H>  5.1   |  29.0  |  0.87    Ca    9.1      21 Mar 2022 09:07  Phos  3.8     03-20  Mg     2.1     03-20            Culture - Fungal, Other (collected 03-09-22 @ 21:49)  Source: .Other scrotal abscess    Culture - Abscess with Gram Stain (collected 03-09-22 @ 21:48)  Source: .Abscess scrotal abscess  Final Report (03-11-22 @ 21:20):    Moderate Methicillin Resistant Staphylococcus aureus    Rare Serratia marcescens    Few Bacteroides fragilis "Susceptibilities not performed"  Organism: Methicillin resistant Staphylococcus aureus  Serratia marcescens (03-11-22 @ 21:20)  Organism: Serratia marcescens (03-11-22 @ 21:20)    Sensitivities:      -  Amikacin: S <=16      -  Amoxicillin/Clavulanic Acid: R >16/8      -  Ampicillin: R 16 These ampicillin results predict results for amoxicillin      -  Ampicillin/Sulbactam: R 8/4 Enterobacter, Klebsiella aerogenes, Citrobacter, and Serratia may develop resistance during prolonged therapy (3-4 days)      -  Aztreonam: S <=4      -  Cefazolin: R >16 Enterobacter, Klebsiella aerogenes, Citrobacter, and Serratia may develop resistance during prolonged therapy (3-4 days)      -  Cefepime: S <=2      -  Cefoxitin: R <=8      -  Ceftriaxone: S <=1 Enterobacter, Klebsiella aerogenes, Citrobacter, and Serratia may develop resistance during prolonged therapy      -  Ciprofloxacin: R >2      -  Ertapenem: S <=0.5      -  Gentamicin: S <=2      -  Levofloxacin: R >4      -  Meropenem: S <=1      -  Piperacillin/Tazobactam: S <=8      -  Tobramycin: S <=2      -  Trimethoprim/Sulfamethoxazole: S 1/19      Method Type: RYAN  Organism: Methicillin resistant Staphylococcus aureus (03-11-22 @ 21:20)    Sensitivities:      -  Ampicillin/Sulbactam: R <=8/4      -  Cefazolin: R <=4      -  Clindamycin: R >4      -  Daptomycin: S 1      -  Erythromycin: R >4      -  Gentamicin: S <=1 Should not be used as monotherapy      -  Linezolid: S 2      -  Oxacillin: R >2      -  Penicillin: R >8      -  Rifampin: S <=1 Should not be used as monotherapy      -  Tetra/Doxy: S 2      -  Trimethoprim/Sulfamethoxazole: S <=0.5/9.5      -  Vancomycin: S 2      Method Type: RYAN    Culture - Urine (collected 03-08-22 @ 22:16)  Source: Clean Catch Clean Catch (Midstream)  Final Report (03-09-22 @ 18:06):    No growth    Culture - Blood (collected 03-08-22 @ 12:28)  Source: .Blood Blood-Peripheral  Final Report (03-13-22 @ 13:01):    No growth at 5 days.    Culture - Blood (collected 03-08-22 @ 12:27)  Source: .Blood Blood-Peripheral  Final Report (03-13-22 @ 13:00):    No growth at 5 days.      Creatinine, Serum: 0.86 mg/dL (03-16-22 @ 05:57)  Creatinine, Serum: 0.83 mg/dL (03-15-22 @ 05:52)  Creatinine, Serum: 0.90 mg/dL (03-14-22 @ 05:47)  Creatinine, Serum: 0.91 mg/dL (03-13-22 @ 11:29)  Creatinine, Serum: 1.04 mg/dL (03-12-22 @ 07:54)    Procalcitonin, Serum: 0.63 ng/mL (03-14-22 @ 05:47)          WBC Count: 19.75 K/uL (03-16-22 @ 05:57)  WBC Count: 19.47 K/uL (03-15-22 @ 05:52)  WBC Count: 20.38 K/uL (03-14-22 @ 05:47)  WBC Count: 15.12 K/uL (03-13-22 @ 11:29)  WBC Count: 10.89 K/uL (03-12-22 @ 07:54)    COVID-19 PCR: NotDetec (03-14-22 @ 06:45)  SARS-CoV-2 Result: NotDetec (03-08-22 @ 12:34)

## 2022-03-22 LAB
ANION GAP SERPL CALC-SCNC: 9 MMOL/L — SIGNIFICANT CHANGE UP (ref 5–17)
BUN SERPL-MCNC: 17.5 MG/DL — SIGNIFICANT CHANGE UP (ref 8–20)
CALCIUM SERPL-MCNC: 8.9 MG/DL — SIGNIFICANT CHANGE UP (ref 8.6–10.2)
CHLORIDE SERPL-SCNC: 98 MMOL/L — SIGNIFICANT CHANGE UP (ref 98–107)
CO2 SERPL-SCNC: 28 MMOL/L — SIGNIFICANT CHANGE UP (ref 22–29)
CREAT SERPL-MCNC: 0.88 MG/DL — SIGNIFICANT CHANGE UP (ref 0.5–1.3)
EGFR: 94 ML/MIN/1.73M2 — SIGNIFICANT CHANGE UP
GLUCOSE SERPL-MCNC: 102 MG/DL — HIGH (ref 70–99)
HCT VFR BLD CALC: 30.8 % — LOW (ref 39–50)
HGB BLD-MCNC: 9.7 G/DL — LOW (ref 13–17)
MAGNESIUM SERPL-MCNC: 1.9 MG/DL — SIGNIFICANT CHANGE UP (ref 1.8–2.6)
MCHC RBC-ENTMCNC: 31.5 GM/DL — LOW (ref 32–36)
MCHC RBC-ENTMCNC: 33.2 PG — SIGNIFICANT CHANGE UP (ref 27–34)
MCV RBC AUTO: 105.5 FL — HIGH (ref 80–100)
PHOSPHATE SERPL-MCNC: 4 MG/DL — SIGNIFICANT CHANGE UP (ref 2.4–4.7)
PLATELET # BLD AUTO: 621 K/UL — HIGH (ref 150–400)
POTASSIUM SERPL-MCNC: 4.8 MMOL/L — SIGNIFICANT CHANGE UP (ref 3.5–5.3)
POTASSIUM SERPL-SCNC: 4.8 MMOL/L — SIGNIFICANT CHANGE UP (ref 3.5–5.3)
RBC # BLD: 2.92 M/UL — LOW (ref 4.2–5.8)
RBC # FLD: 14.6 % — HIGH (ref 10.3–14.5)
SODIUM SERPL-SCNC: 135 MMOL/L — SIGNIFICANT CHANGE UP (ref 135–145)
WBC # BLD: 8.81 K/UL — SIGNIFICANT CHANGE UP (ref 3.8–10.5)
WBC # FLD AUTO: 8.81 K/UL — SIGNIFICANT CHANGE UP (ref 3.8–10.5)

## 2022-03-22 PROCEDURE — 36556 INSERT NON-TUNNEL CV CATH: CPT

## 2022-03-22 PROCEDURE — 76942 ECHO GUIDE FOR BIOPSY: CPT | Mod: 26,59

## 2022-03-22 PROCEDURE — 97598 DBRDMT OPN WND ADDL 20CM/<: CPT | Mod: 59

## 2022-03-22 PROCEDURE — 99233 SBSQ HOSP IP/OBS HIGH 50: CPT

## 2022-03-22 PROCEDURE — 76937 US GUIDE VASCULAR ACCESS: CPT | Mod: 26,59

## 2022-03-22 PROCEDURE — 97597 DBRDMT OPN WND 1ST 20 CM/<: CPT | Mod: 59

## 2022-03-22 RX ORDER — ERTAPENEM SODIUM 1 G/1
1000 INJECTION, POWDER, LYOPHILIZED, FOR SOLUTION INTRAMUSCULAR; INTRAVENOUS EVERY 24 HOURS
Refills: 0 | Status: DISCONTINUED | OUTPATIENT
Start: 2022-03-22 | End: 2022-03-24

## 2022-03-22 RX ORDER — COLLAGENASE CLOSTRIDIUM HIST. 250 UNIT/G
1 OINTMENT (GRAM) TOPICAL DAILY
Refills: 0 | Status: DISCONTINUED | OUTPATIENT
Start: 2022-03-22 | End: 2022-03-24

## 2022-03-22 RX ORDER — ERTAPENEM SODIUM 1 G/1
1 INJECTION, POWDER, LYOPHILIZED, FOR SOLUTION INTRAMUSCULAR; INTRAVENOUS
Qty: 14 | Refills: 0
Start: 2022-03-22 | End: 2022-04-04

## 2022-03-22 RX ADMIN — OXYCODONE HYDROCHLORIDE 10 MILLIGRAM(S): 5 TABLET ORAL at 11:30

## 2022-03-22 RX ADMIN — ERTAPENEM SODIUM 120 MILLIGRAM(S): 1 INJECTION, POWDER, LYOPHILIZED, FOR SOLUTION INTRAMUSCULAR; INTRAVENOUS at 13:06

## 2022-03-22 RX ADMIN — HEPARIN SODIUM 7500 UNIT(S): 5000 INJECTION INTRAVENOUS; SUBCUTANEOUS at 05:54

## 2022-03-22 RX ADMIN — Medication 650 MILLIGRAM(S): at 17:37

## 2022-03-22 RX ADMIN — Medication 1 APPLICATION(S): at 21:57

## 2022-03-22 RX ADMIN — Medication 1 APPLICATION(S): at 17:36

## 2022-03-22 RX ADMIN — OXYCODONE HYDROCHLORIDE 5 MILLIGRAM(S): 5 TABLET ORAL at 01:05

## 2022-03-22 RX ADMIN — OXYCODONE HYDROCHLORIDE 5 MILLIGRAM(S): 5 TABLET ORAL at 01:40

## 2022-03-22 RX ADMIN — Medication 650 MILLIGRAM(S): at 05:56

## 2022-03-22 RX ADMIN — DIPHENHYDRAMINE HYDROCHLORIDE AND LIDOCAINE HYDROCHLORIDE AND ALUMINUM HYDROXIDE AND MAGNESIUM HYDRO 10 MILLILITER(S): KIT at 17:36

## 2022-03-22 RX ADMIN — OXYCODONE HYDROCHLORIDE 10 MILLIGRAM(S): 5 TABLET ORAL at 16:11

## 2022-03-22 RX ADMIN — Medication 1 APPLICATION(S): at 05:54

## 2022-03-22 RX ADMIN — MONTELUKAST 10 MILLIGRAM(S): 4 TABLET, CHEWABLE ORAL at 13:08

## 2022-03-22 RX ADMIN — TAMSULOSIN HYDROCHLORIDE 0.4 MILLIGRAM(S): 0.4 CAPSULE ORAL at 21:57

## 2022-03-22 RX ADMIN — OXYCODONE HYDROCHLORIDE 10 MILLIGRAM(S): 5 TABLET ORAL at 20:14

## 2022-03-22 RX ADMIN — LOSARTAN POTASSIUM 50 MILLIGRAM(S): 100 TABLET, FILM COATED ORAL at 05:55

## 2022-03-22 RX ADMIN — Medication 3 MILLILITER(S): at 21:39

## 2022-03-22 RX ADMIN — SIMVASTATIN 40 MILLIGRAM(S): 20 TABLET, FILM COATED ORAL at 21:57

## 2022-03-22 RX ADMIN — Medication 3 MILLILITER(S): at 03:08

## 2022-03-22 RX ADMIN — Medication 3 MILLILITER(S): at 10:17

## 2022-03-22 RX ADMIN — HEPARIN SODIUM 7500 UNIT(S): 5000 INJECTION INTRAVENOUS; SUBCUTANEOUS at 13:08

## 2022-03-22 RX ADMIN — DIPHENHYDRAMINE HYDROCHLORIDE AND LIDOCAINE HYDROCHLORIDE AND ALUMINUM HYDROXIDE AND MAGNESIUM HYDRO 10 MILLILITER(S): KIT at 05:56

## 2022-03-22 RX ADMIN — Medication 650 MILLIGRAM(S): at 16:30

## 2022-03-22 RX ADMIN — Medication 166.67 MILLIGRAM(S): at 05:56

## 2022-03-22 RX ADMIN — Medication 650 MILLIGRAM(S): at 18:10

## 2022-03-22 RX ADMIN — OXYCODONE HYDROCHLORIDE 10 MILLIGRAM(S): 5 TABLET ORAL at 21:00

## 2022-03-22 RX ADMIN — Medication 166.67 MILLIGRAM(S): at 17:36

## 2022-03-22 RX ADMIN — OXYCODONE HYDROCHLORIDE 10 MILLIGRAM(S): 5 TABLET ORAL at 12:47

## 2022-03-22 RX ADMIN — Medication 650 MILLIGRAM(S): at 13:07

## 2022-03-22 RX ADMIN — HEPARIN SODIUM 7500 UNIT(S): 5000 INJECTION INTRAVENOUS; SUBCUTANEOUS at 21:56

## 2022-03-22 RX ADMIN — Medication 650 MILLIGRAM(S): at 06:30

## 2022-03-22 RX ADMIN — CHLORHEXIDINE GLUCONATE 1 APPLICATION(S): 213 SOLUTION TOPICAL at 13:08

## 2022-03-22 RX ADMIN — OXYCODONE HYDROCHLORIDE 10 MILLIGRAM(S): 5 TABLET ORAL at 16:30

## 2022-03-22 RX ADMIN — Medication 1 APPLICATION(S): at 13:07

## 2022-03-22 NOTE — PROGRESS NOTE ADULT - ASSESSMENT
67 yo male admitted with sepsis, ARF, scrotal abscess, s/p I&D abscess, SPT placement  - cont abx  - midline ordered for continued IVabx need  - will debride eschar at bedside today  -  consult ordered for anticipated home care needs  - pt overall improving well  - ID f/u for anticipated abx duration

## 2022-03-22 NOTE — PROGRESS NOTE ADULT - SUBJECTIVE AND OBJECTIVE BOX
Subjective:66yMale s/p right scrotal abscess I&D 3/9, SPT in IR 3/16.  Pt resting comfortably, no c/o pain at this time.    SPT: clear, yellow    Vital Signs Last 24 Hrs  T(C): 36.7 (22 Mar 2022 06:15), Max: 37.6 (21 Mar 2022 08:37)  T(F): 98.1 (22 Mar 2022 06:15), Max: 99.6 (21 Mar 2022 08:37)  HR: 97 (22 Mar 2022 06:15) (79 - 114)  BP: 125/76 (22 Mar 2022 06:15) (121/81 - 137/82)  BP(mean): --  RR: 18 (22 Mar 2022 06:15) (18 - 20)  SpO2: 95% (22 Mar 2022 06:15) (90% - 99%)  I&O's Detail    21 Mar 2022 07:01  -  22 Mar 2022 07:00  --------------------------------------------------------  IN:  Total IN: 0 mL    OUT:    Indwelling Catheter - Suprapubic (mL): 2100 mL  Total OUT: 2100 mL    Total NET: -2100 mL          Labs:                        9.7    8.81  )-----------( 621      ( 22 Mar 2022 06:34 )             30.8     03-22    135  |  98  |  17.5  ----------------------------<  102<H>  4.8   |  28.0  |  0.88    Ca    8.9      22 Mar 2022 01:07  Phos  4.0     03-22  Mg     1.9     03-22

## 2022-03-22 NOTE — PHARMACOTHERAPY INTERVENTION NOTE - COMMENTS
Scr improved, recommended to increase cefepime dose
Vancomycin level 10.3, adjusted dose to 1g IV q12h.
Vancomycin level 10.5, adjusted dose to 1250 mg IV q12h.
Vancomycin level ordered
Vancomycin level ordered
Vancomycin trough ordered
repeat vancomycin level ordered  Scr improved, will increase vancomycin pending level
Vancomycin level ordered
Vancomycin level ordered
Pt on 1250 mG q12h for abscess. Follow-up level ordered 03/20/22 @ 05:00

## 2022-03-22 NOTE — PROCEDURE NOTE - ADDITIONAL PROCEDURE DETAILS
18G 10CM 30CIRC [place] POWER GLIDE MIDLINE good heme return ns flush right cephalic vein
scrotal eschar debrided, thin layer of fibrotic tissue present, will start santyl

## 2022-03-22 NOTE — PROGRESS NOTE ADULT - ASSESSMENT
Patient is a 65 y/o male s/p right orchiectomy on 3/1, admitted 3/8/22     scrotal abscess. Now s/p I&D of scrotal absces 3/10   AND PLACED ON IV ABX   PT  WITH I  LEUKOCYTOSIS   PT WITH SERRATIA /MRSA AND BACTEROIDES      WBC DOWN TO   8.8   S/P SP CATHETER PLACEMENT BY IR      HIV NEGATIVE   OVERALL IMPROVED DECREASED PENILE EDEMA   MIDLINE IN PLACE  WILL PLAN 2 WEEKS IV ABX HOME ALONG WITH ORAL ZYVOX   CAN CHANGE TO ZYVOX UPON DISCHARGE  SPOKE TO SISTER    D/W

## 2022-03-22 NOTE — PROGRESS NOTE ADULT - SUBJECTIVE AND OBJECTIVE BOX
INFECTIOUS DISEASES AND INTERNAL MEDICINE at [facility]  =======================================================  Eliud Jarvis MD  Diplomates [employer] Board of Internal Medicine and Infectious Diseases  Telephone [phone]  Fax            [phone]  =======================================================    HENRIQUE AMAYA [id number]    Follow up: LEUKOCYTOSIS SCROTAL INFECTION     Allergies:  penicillins (Unknown)      Medications:  acetaminophen     Tablet .. 650 milliGRAM(s) Oral every 6 hours PRN  acetaminophen     Tablet .. 650 milliGRAM(s) Oral every 6 hours  albuterol/ipratropium for Nebulization 3 milliLiter(s) Nebulizer every 6 hours  BACItracin   Ointment 1 Application(s) Topical every 6 hours  chlorhexidine 2% Cloths 1 Application(s) Topical daily  heparin   Injectable 7500 Unit(s) SubCutaneous every 8 hours  influenza  Vaccine (HIGH DOSE) 0.7 milliLiter(s) IntraMuscular once  lactated ringers. 1000 milliLiter(s) IV Continuous <Continuous>  losartan 25 milliGRAM(s) Oral daily  meropenem  IVPB 1000 milliGRAM(s) IV Intermittent every 8 hours  montelukast 10 milliGRAM(s) Oral daily  oxyCODONE    IR 5 milliGRAM(s) Oral every 4 hours PRN  oxyCODONE    IR 10 milliGRAM(s) Oral every 4 hours PRN  polyethylene glycol 3350 17 Gram(s) Oral daily  senna 2 Tablet(s) Oral at bedtime  silver sulfADIAZINE 1% Cream 1 Application(s) Topical daily  simvastatin 40 milliGRAM(s) Oral at bedtime  tamsulosin 0.4 milliGRAM(s) Oral at bedtime  vancomycin  IVPB 1000 milliGRAM(s) IV Intermittent every 12 hours    SOCIAL       FAMILY   FAMILY HISTORY:  Family history not known due to adoption (Father, Mother)      REVIEW OF SYSTEMS:  CONSTITUTIONAL:  No Fever or chills  HEENT:   No diplopia or blurred vision.  No earache, sore throat or runny nose.  CARDIOVASCULAR:  No pressure, squeezing, strangling, tightness, heaviness or aching about the chest, neck, axilla or epigastrium.  RESPIRATORY:    cough, shortness of breath,   GASTROINTESTINAL:  No nausea, vomiting or diarrhea.  GENITOURINARY:  AS PER HPI   MUSCULOSKELETAL:   moves all joints  SKIN:  No change in skin, hair or nails.  NEUROLOGIC:  No paresthesias, fasciculations, seizures or weakness.  PSYCHIATRIC:  No disorder of thought or mood.  ENDOCRINE:  No heat or cold intolerance, polyuria or polydipsia.  HEMATOLOGICAL:  No easy bruising or bleeding.            Physical Exam:  I   Vital Signs Last 24 Hrs  T(C): 37.3 (22 Mar 2022 07:15), Max: 37.3 (22 Mar 2022 07:15)  T(F): 99.1 (22 Mar 2022 07:15), Max: 99.1 (22 Mar 2022 07:15)  HR: 91 (22 Mar 2022 07:15) (79 - 114)  BP: 132/79 (22 Mar 2022 07:15) (125/76 - 137/82)  BP(mean): --  RR: 19 (22 Mar 2022 07:15) (18 - 20)  SpO2: 99% (22 Mar 2022 07:15) (90% - 99%)    GEN: NAD,   HEENT: normocephalic and atraumatic. EOMI. JADE.    NECK: Supple. No carotid bruits.  No lymphadenopathy or thyromegaly.  LUNGS: Clear to auscultation.  HEART: Regular rate and rhythm without murmur.  ABDOMEN: Soft, nontender, and nondistended.  Positive bowel sounds.    :   DECREASED PENILE EDEMA AND ERYTHEMA  SCROTAL WOUND DRESSED SUPRAPUBIC CATHETER IN PLACE  EXTREMITIES: Without any cyanosis, clubbing, rash, lesions or edema.  MSK: no joint swelling  NEUROLOGIC: Cranial nerves II through XII are grossly intact.  PSYCHIATRIC: Appropriate affect .  SKIN: No ulceration or induration present.        Labs:     =======================================================  Current Antibiotics:  invanz  vancomycin  IVPB 1000 milliGRAM(s) IV Intermittent every 12 hours    Other medications:  acetaminophen     Tablet .. 650 milliGRAM(s) Oral every 6 hours  albuterol/ipratropium for Nebulization 3 milliLiter(s) Nebulizer every 6 hours  BACItracin   Ointment 1 Application(s) Topical every 6 hours  chlorhexidine 2% Cloths 1 Application(s) Topical daily  heparin   Injectable 7500 Unit(s) SubCutaneous every 8 hours  influenza  Vaccine (HIGH DOSE) 0.7 milliLiter(s) IntraMuscular once  lactated ringers. 1000 milliLiter(s) IV Continuous <Continuous>  losartan 25 milliGRAM(s) Oral daily  montelukast 10 milliGRAM(s) Oral daily  polyethylene glycol 3350 17 Gram(s) Oral daily  senna 2 Tablet(s) Oral at bedtime  silver sulfADIAZINE 1% Cream 1 Application(s) Topical daily  simvastatin 40 milliGRAM(s) Oral at bedtime  tamsulosin 0.4 milliGRAM(s) Oral at bedtime      =======================================================  Labs:                                                                     9.7    8.81  )-----------( 621      ( 22 Mar 2022 06:34 )             30.8   03-22    135  |  98  |  17.5  ----------------------------<  102<H>  4.8   |  28.0  |  0.88    Ca    8.9      22 Mar 2022 01:07  Phos  4.0     03-22  Mg     1.9     03-22                Culture - Fungal, Other (collected 03-09-22 @ 21:49)  Source: .Other scrotal abscess    Culture - Abscess with Gram Stain (collected 03-09-22 @ 21:48)  Source: .Abscess scrotal abscess  Final Report (03-11-22 @ 21:20):    Moderate Methicillin Resistant Staphylococcus aureus    Rare Serratia marcescens    Few Bacteroides fragilis "Susceptibilities not performed"  Organism: Methicillin resistant Staphylococcus aureus  Serratia marcescens (03-11-22 @ 21:20)  Organism: Serratia marcescens (03-11-22 @ 21:20)    Sensitivities:      -  Amikacin: S <=16      -  Amoxicillin/Clavulanic Acid: R >16/8      -  Ampicillin: R 16 These ampicillin results predict results for amoxicillin      -  Ampicillin/Sulbactam: R 8/4 Enterobacter, Klebsiella aerogenes, Citrobacter, and Serratia may develop resistance during prolonged therapy (3-4 days)      -  Aztreonam: S <=4      -  Cefazolin: R >16 Enterobacter, Klebsiella aerogenes, Citrobacter, and Serratia may develop resistance during prolonged therapy (3-4 days)      -  Cefepime: S <=2      -  Cefoxitin: R <=8      -  Ceftriaxone: S <=1 Enterobacter, Klebsiella aerogenes, Citrobacter, and Serratia may develop resistance during prolonged therapy      -  Ciprofloxacin: R >2      -  Ertapenem: S <=0.5      -  Gentamicin: S <=2      -  Levofloxacin: R >4      -  Meropenem: S <=1      -  Piperacillin/Tazobactam: S <=8      -  Tobramycin: S <=2      -  Trimethoprim/Sulfamethoxazole: S 1/19      Method Type: RYAN  Organism: Methicillin resistant Staphylococcus aureus (03-11-22 @ 21:20)    Sensitivities:      -  Ampicillin/Sulbactam: R <=8/4      -  Cefazolin: R <=4      -  Clindamycin: R >4      -  Daptomycin: S 1      -  Erythromycin: R >4      -  Gentamicin: S <=1 Should not be used as monotherapy      -  Linezolid: S 2      -  Oxacillin: R >2      -  Penicillin: R >8      -  Rifampin: S <=1 Should not be used as monotherapy      -  Tetra/Doxy: S 2      -  Trimethoprim/Sulfamethoxazole: S <=0.5/9.5      -  Vancomycin: S 2      Method Type: RYAN    Culture - Urine (collected 03-08-22 @ 22:16)  Source: Clean Catch Clean Catch (Midstream)  Final Report (03-09-22 @ 18:06):    No growth    Culture - Blood (collected 03-08-22 @ 12:28)  Source: .Blood Blood-Peripheral  Final Report (03-13-22 @ 13:01):    No growth at 5 days.    Culture - Blood (collected 03-08-22 @ 12:27)  Source: .Blood Blood-Peripheral  Final Report (03-13-22 @ 13:00):    No growth at 5 days.      Creatinine, Serum: 0.86 mg/dL (03-16-22 @ 05:57)  Creatinine, Serum: 0.83 mg/dL (03-15-22 @ 05:52)  Creatinine, Serum: 0.90 mg/dL (03-14-22 @ 05:47)  Creatinine, Serum: 0.91 mg/dL (03-13-22 @ 11:29)  Creatinine, Serum: 1.04 mg/dL (03-12-22 @ 07:54)    Procalcitonin, Serum: 0.63 ng/mL (03-14-22 @ 05:47)          WBC Count: 19.75 K/uL (03-16-22 @ 05:57)  WBC Count: 19.47 K/uL (03-15-22 @ 05:52)  WBC Count: 20.38 K/uL (03-14-22 @ 05:47)  WBC Count: 15.12 K/uL (03-13-22 @ 11:29)  WBC Count: 10.89 K/uL (03-12-22 @ 07:54)    COVID-19 PCR: NotDetec (03-14-22 @ 06:45)  SARS-CoV-2 Result: NotDetec (03-08-22 @ 12:34)

## 2022-03-22 NOTE — PHARMACOTHERAPY INTERVENTION NOTE - OUTCOME
accepted

## 2022-03-22 NOTE — PHARMACOTHERAPY INTERVENTION NOTE - NSPHARMCOMMASP
ASP - Lab/ test recommended
ASP - Dose optimization/Non-Renal dose adjustment
ASP - Lab/ test recommended
ASP - Renal dose adjustment
ASP - Dose optimization/Non-Renal dose adjustment
ASP - Lab/ test recommended
ASP - Lab/ test recommended

## 2022-03-22 NOTE — PROCEDURE NOTE - NSICDXPROCEDURE_GEN_ALL_CORE_FT
PROCEDURES:  Incision and drainage, abscess, scrotum 09-Mar-2022 12:48:13  Amalia Puckett  Debridement of right scrotum 22-Mar-2022 11:33:50 full thickness, sharp debridement of anterior scrotum Amalia Puckett

## 2022-03-22 NOTE — PROCEDURE NOTE - NSPROCDETAILS_GEN_ALL_CORE
incision left open, packing placed
location identified, draped/prepped, sterile technique used/sterile dressing applied/sterile technique, catheter placed/supine position/ultrasound guidance

## 2022-03-23 ENCOUNTER — TRANSCRIPTION ENCOUNTER (OUTPATIENT)
Age: 67
End: 2022-03-23

## 2022-03-23 LAB — VANCOMYCIN TROUGH SERPL-MCNC: 16.2 UG/ML — SIGNIFICANT CHANGE UP (ref 10–20)

## 2022-03-23 PROCEDURE — 99232 SBSQ HOSP IP/OBS MODERATE 35: CPT

## 2022-03-23 RX ORDER — ASCORBIC ACID 60 MG
500 TABLET,CHEWABLE ORAL DAILY
Refills: 0 | Status: DISCONTINUED | OUTPATIENT
Start: 2022-03-23 | End: 2022-03-24

## 2022-03-23 RX ORDER — LINEZOLID 600 MG/300ML
600 INJECTION, SOLUTION INTRAVENOUS EVERY 12 HOURS
Refills: 0 | Status: DISCONTINUED | OUTPATIENT
Start: 2022-03-23 | End: 2022-03-24

## 2022-03-23 RX ADMIN — Medication 650 MILLIGRAM(S): at 18:05

## 2022-03-23 RX ADMIN — SENNA PLUS 2 TABLET(S): 8.6 TABLET ORAL at 20:30

## 2022-03-23 RX ADMIN — OXYCODONE HYDROCHLORIDE 10 MILLIGRAM(S): 5 TABLET ORAL at 14:00

## 2022-03-23 RX ADMIN — Medication 1 APPLICATION(S): at 17:29

## 2022-03-23 RX ADMIN — Medication 3 MILLILITER(S): at 09:15

## 2022-03-23 RX ADMIN — OXYCODONE HYDROCHLORIDE 10 MILLIGRAM(S): 5 TABLET ORAL at 01:00

## 2022-03-23 RX ADMIN — Medication 650 MILLIGRAM(S): at 17:29

## 2022-03-23 RX ADMIN — Medication 500 MILLIGRAM(S): at 13:06

## 2022-03-23 RX ADMIN — HEPARIN SODIUM 7500 UNIT(S): 5000 INJECTION INTRAVENOUS; SUBCUTANEOUS at 13:06

## 2022-03-23 RX ADMIN — Medication 650 MILLIGRAM(S): at 06:00

## 2022-03-23 RX ADMIN — OXYCODONE HYDROCHLORIDE 10 MILLIGRAM(S): 5 TABLET ORAL at 20:30

## 2022-03-23 RX ADMIN — DIPHENHYDRAMINE HYDROCHLORIDE AND LIDOCAINE HYDROCHLORIDE AND ALUMINUM HYDROXIDE AND MAGNESIUM HYDRO 10 MILLILITER(S): KIT at 05:05

## 2022-03-23 RX ADMIN — Medication 650 MILLIGRAM(S): at 05:04

## 2022-03-23 RX ADMIN — Medication 650 MILLIGRAM(S): at 13:06

## 2022-03-23 RX ADMIN — DIPHENHYDRAMINE HYDROCHLORIDE AND LIDOCAINE HYDROCHLORIDE AND ALUMINUM HYDROXIDE AND MAGNESIUM HYDRO 10 MILLILITER(S): KIT at 17:29

## 2022-03-23 RX ADMIN — Medication 1 APPLICATION(S): at 13:07

## 2022-03-23 RX ADMIN — OXYCODONE HYDROCHLORIDE 10 MILLIGRAM(S): 5 TABLET ORAL at 05:04

## 2022-03-23 RX ADMIN — LOSARTAN POTASSIUM 50 MILLIGRAM(S): 100 TABLET, FILM COATED ORAL at 05:04

## 2022-03-23 RX ADMIN — Medication 1 TABLET(S): at 13:06

## 2022-03-23 RX ADMIN — TAMSULOSIN HYDROCHLORIDE 0.4 MILLIGRAM(S): 0.4 CAPSULE ORAL at 20:30

## 2022-03-23 RX ADMIN — MONTELUKAST 10 MILLIGRAM(S): 4 TABLET, CHEWABLE ORAL at 13:06

## 2022-03-23 RX ADMIN — Medication 1 APPLICATION(S): at 05:04

## 2022-03-23 RX ADMIN — Medication 166.67 MILLIGRAM(S): at 05:05

## 2022-03-23 RX ADMIN — Medication 650 MILLIGRAM(S): at 14:00

## 2022-03-23 RX ADMIN — SIMVASTATIN 40 MILLIGRAM(S): 20 TABLET, FILM COATED ORAL at 21:42

## 2022-03-23 RX ADMIN — Medication 3 MILLILITER(S): at 23:22

## 2022-03-23 RX ADMIN — ERTAPENEM SODIUM 120 MILLIGRAM(S): 1 INJECTION, POWDER, LYOPHILIZED, FOR SOLUTION INTRAMUSCULAR; INTRAVENOUS at 13:06

## 2022-03-23 RX ADMIN — CHLORHEXIDINE GLUCONATE 1 APPLICATION(S): 213 SOLUTION TOPICAL at 13:07

## 2022-03-23 RX ADMIN — OXYCODONE HYDROCHLORIDE 10 MILLIGRAM(S): 5 TABLET ORAL at 09:26

## 2022-03-23 RX ADMIN — OXYCODONE HYDROCHLORIDE 10 MILLIGRAM(S): 5 TABLET ORAL at 15:00

## 2022-03-23 RX ADMIN — OXYCODONE HYDROCHLORIDE 10 MILLIGRAM(S): 5 TABLET ORAL at 21:10

## 2022-03-23 RX ADMIN — HEPARIN SODIUM 7500 UNIT(S): 5000 INJECTION INTRAVENOUS; SUBCUTANEOUS at 05:03

## 2022-03-23 RX ADMIN — Medication 3 MILLILITER(S): at 16:11

## 2022-03-23 RX ADMIN — OXYCODONE HYDROCHLORIDE 10 MILLIGRAM(S): 5 TABLET ORAL at 06:00

## 2022-03-23 RX ADMIN — OXYCODONE HYDROCHLORIDE 10 MILLIGRAM(S): 5 TABLET ORAL at 10:30

## 2022-03-23 RX ADMIN — OXYCODONE HYDROCHLORIDE 10 MILLIGRAM(S): 5 TABLET ORAL at 00:22

## 2022-03-23 RX ADMIN — HEPARIN SODIUM 7500 UNIT(S): 5000 INJECTION INTRAVENOUS; SUBCUTANEOUS at 20:31

## 2022-03-23 RX ADMIN — LINEZOLID 600 MILLIGRAM(S): 600 INJECTION, SOLUTION INTRAVENOUS at 17:30

## 2022-03-23 NOTE — DISCHARGE NOTE PROVIDER - HOSPITAL COURSE
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  His initial procedure went well, no signs of infection and was d/c'd to home on antibiotics.  Pt was sent to the  ED 3/8 after being seen in the office for concern of an infection.  pt had a low grade fever, 100.6'F, labs showed an SAVANNA, elevated lactate, and was hypotensive.  Pt was started on levophed to maintain his BP.  Pt seen by SICU and was admitted to SICU for sepsis, vasopressors, anitbiotics and fluid resuscitation.  CT scan revealed a right scrotal abscess.  pt was taken to the OR 3/9 in the am for I&D of abscess and washout of wound. A penrose was placed in the cavity, and bacitracin and xeroform placed on anterior scrotum for blistering that was noted. The pt's penis was very edematous, the wheeler catheter was kept in place. Pt did well post-operatively, no longer required vasopressors, afebrile, SAVANNA started to resolve and the pt was downgraded to the floor on 3/10/22.  Pt received wound care daily to scrotum, blood and urine cultures were negative, wound cx from the OR grew MRSA and serratia.  3/13/22 pt started to have an increasing leukocytosis, remained afebrile, wound had minimal drainage, penis remained edematous and tender, no collections appreciated.  WBC remained elevated until 3/16, pt remained afebrile, CT scan performed to r/o any collections or source of leukocytosis, CT was negative for any collections.  3/16 a SPT was placed so the wheeler could be removed and alleviate any pain or pressure in his penis.  3/17 the wbc started to decrease, pt continued to feel better and improve clinically.  ID was also following the pt for antibiotics therapy. The skin on the anterior scrotum that had blisters, declared itself, pt had a 5x5 eschar, wound edges demarcated, slivadene was applied.  Once the wound edges started to separate, the eschar was sharply debrided at bedside with scissors on 3/22.  Santyl ointment was then applied to the wound, penrose had been removed and daily packing with wet to dry wound care continued.  Pt remains afebrile, WBC normalized, a midline was placed 3/22 in anticipation for d/c with home infusion of Invanz.  Pt is  stable and ready for d/c to home on invanz IV and PO zyvox for 2 weeks.  [facility] has been set up for home care needs.

## 2022-03-23 NOTE — PROGRESS NOTE ADULT - ASSESSMENT
Patient is a 67 y/o male s/p right orchiectomy on 3/1, admitted 3/8/22     scrotal abscess. Now s/p I&D of scrotal absces 3/10   AND PLACED ON IV ABX   PT  WITH I  LEUKOCYTOSIS   PT WITH SERRATIA /MRSA AND BACTEROIDES    WBC DOWN TO   8.8   S/P SP CATHETER PLACEMENT BY IR      OVERALL IMPROVED DECREASED PENILE EDEMA   MIDLINE IN PLACE  WILL PLAN 2 WEEKS IV INVANZ  ABX HOME ALONG WITH ORAL ZYVOX   CAN CHANGE TO ZYVOX  600 BID  UPON DISCHARGE   WILL  D/W

## 2022-03-23 NOTE — DISCHARGE NOTE PROVIDER - NSDCCPTREATMENT_GEN_ALL_CORE_FT
PRINCIPAL PROCEDURE  Procedure: Incision and drainage of abscess of right scrotum  Findings and Treatment: penrose drain, wound care, packing      SECONDARY PROCEDURE  Procedure: Suprapubic tube placement  Findings and Treatment: SPT to leg bag

## 2022-03-23 NOTE — DISCHARGE NOTE PROVIDER - NSDCCPCAREPLAN_GEN_ALL_CORE_FT
PRINCIPAL DISCHARGE DIAGNOSIS  Diagnosis: Scrotal abscess  Assessment and Plan of Treatment: incision and draiange performed 3/9/22  suprapubic tube 3/16/22  Wound care to be done daily to scrotum with wet to dry packing and santyl ointment to scrotal wound daily  you may shower and then reapply the dressing  call the office if you have a fever, bleeding nausea or vomiting  suprapubic tube to leg bag and empty as needed  do not let tube hang from your abdomen, it could get pulled out  elevate your scrotum and penis on a rolled up towel when lying in bed

## 2022-03-23 NOTE — DISCHARGE NOTE PROVIDER - NSDCFUADDINST_GEN_ALL_CORE_FT
suprapubic tube to leg bag- empty as needed  do not let tube hang, it can get pulled out  wound care to scrotum- packing to right scrotum daily, santyl ointment to wound daily  you may shower and cleans gently

## 2022-03-23 NOTE — CHART NOTE - NSCHARTNOTESELECT_GEN_ALL_CORE
Event Note
Event Note
Urology/Event Note
Nutrition Services
Nutrition Services
Transfer Note
Urology/Event Note

## 2022-03-23 NOTE — PROGRESS NOTE ADULT - SUBJECTIVE AND OBJECTIVE BOX
INFECTIOUS DISEASES AND INTERNAL MEDICINE at [facility]  =======================================================  Eliud Jarvis MD  Diplomates American Board of Internal Medicine and Infectious Diseases  Telephone [phone]  Fax            [phone]  =======================================================    HENRIQUE AMAYA [id number]    Follow up: LEUKOCYTOSIS SCROTAL INFECTION     Allergies:  penicillins (Unknown)      Medications:  acetaminophen     Tablet .. 650 milliGRAM(s) Oral every 6 hours PRN  acetaminophen     Tablet .. 650 milliGRAM(s) Oral every 6 hours  albuterol/ipratropium for Nebulization 3 milliLiter(s) Nebulizer every 6 hours  BACItracin   Ointment 1 Application(s) Topical every 6 hours  chlorhexidine 2% Cloths 1 Application(s) Topical daily  heparin   Injectable 7500 Unit(s) SubCutaneous every 8 hours  influenza  Vaccine (HIGH DOSE) 0.7 milliLiter(s) IntraMuscular once  lactated ringers. 1000 milliLiter(s) IV Continuous <Continuous>  losartan 25 milliGRAM(s) Oral daily  meropenem  IVPB 1000 milliGRAM(s) IV Intermittent every 8 hours  montelukast 10 milliGRAM(s) Oral daily  oxyCODONE    IR 5 milliGRAM(s) Oral every 4 hours PRN  oxyCODONE    IR 10 milliGRAM(s) Oral every 4 hours PRN  polyethylene glycol 3350 17 Gram(s) Oral daily  senna 2 Tablet(s) Oral at bedtime  silver sulfADIAZINE 1% Cream 1 Application(s) Topical daily  simvastatin 40 milliGRAM(s) Oral at bedtime  tamsulosin 0.4 milliGRAM(s) Oral at bedtime  vancomycin  IVPB 1000 milliGRAM(s) IV Intermittent every 12 hours    SOCIAL       FAMILY   FAMILY HISTORY:  Family history not known due to adoption (Father, Mother)      REVIEW OF SYSTEMS:  CONSTITUTIONAL:  No Fever or chills  HEENT:   No diplopia or blurred vision.  No earache, sore throat or runny nose.  CARDIOVASCULAR:  No pressure, squeezing, strangling, tightness, heaviness or aching about the chest, neck, axilla or epigastrium.  RESPIRATORY:    cough, shortness of breath,   GASTROINTESTINAL:  No nausea, vomiting or diarrhea.  GENITOURINARY:  AS PER HPI   MUSCULOSKELETAL:   moves all joints  SKIN:  No change in skin, hair or nails.  NEUROLOGIC:  No paresthesias, fasciculations, seizures or weakness.  PSYCHIATRIC:  No disorder of thought or mood.  ENDOCRINE:  No heat or cold intolerance, polyuria or polydipsia.  HEMATOLOGICAL:  No easy bruising or bleeding.            Physical Exam:  I    Vital Signs Last 24 Hrs  T(C): 37.1 (23 Mar 2022 09:00), Max: 37.1 (22 Mar 2022 16:37)  T(F): 98.7 (23 Mar 2022 09:00), Max: 98.8 (22 Mar 2022 16:37)  HR: 91 (23 Mar 2022 09:00) (76 - 95)  BP: 127/83 (23 Mar 2022 09:00) (113/72 - 150/94)  BP(mean): --  RR: 18 (23 Mar 2022 09:00) (18 - 20)  SpO2: 97% (23 Mar 2022 09:00) (91% - 97%)    GEN: NAD,   HEENT: normocephalic and atraumatic. EOMI. JADE.    NECK: Supple. No carotid bruits.  No lymphadenopathy or thyromegaly.  LUNGS: Clear to auscultation.  HEART: Regular rate and rhythm without murmur.  ABDOMEN: Soft, nontender, and nondistended.  Positive bowel sounds.    :   DECREASED PENILE EDEMA AND ERYTHEMA  SCROTAL WOUND DRESSED SUPRAPUBIC CATHETER IN PLACE  EXTREMITIES: Without any cyanosis, clubbing, rash, lesions or edema.  MSK: no joint swelling  NEUROLOGIC: Cranial nerves II through XII are grossly intact.  PSYCHIATRIC: Appropriate affect .  SKIN: No ulceration or induration present.        Labs:     =======================================================  Current Antibiotics:  invanz  vancomycin  IVPB 1000 milliGRAM(s) IV Intermittent every 12 hours    Other medications:  acetaminophen     Tablet .. 650 milliGRAM(s) Oral every 6 hours  albuterol/ipratropium for Nebulization 3 milliLiter(s) Nebulizer every 6 hours  BACItracin   Ointment 1 Application(s) Topical every 6 hours  chlorhexidine 2% Cloths 1 Application(s) Topical daily  heparin   Injectable 7500 Unit(s) SubCutaneous every 8 hours  influenza  Vaccine (HIGH DOSE) 0.7 milliLiter(s) IntraMuscular once  lactated ringers. 1000 milliLiter(s) IV Continuous <Continuous>  losartan 25 milliGRAM(s) Oral daily  montelukast 10 milliGRAM(s) Oral daily  polyethylene glycol 3350 17 Gram(s) Oral daily  senna 2 Tablet(s) Oral at bedtime  silver sulfADIAZINE 1% Cream 1 Application(s) Topical daily  simvastatin 40 milliGRAM(s) Oral at bedtime  tamsulosin 0.4 milliGRAM(s) Oral at bedtime      =======================================================  Labs:                                                            9.7    8.81  )-----------( 621      ( 22 Mar 2022 06:34 )             30.8   03-22    135  |  98  |  17.5  ----------------------------<  102<H>  4.8   |  28.0  |  0.88    Ca    8.9      22 Mar 2022 01:07  Phos  4.0     03-22  Mg     1.9     03-22                  Culture - Fungal, Other (collected 03-09-22 @ 21:49)  Source: .Other scrotal abscess    Culture - Abscess with Gram Stain (collected 03-09-22 @ 21:48)  Source: .Abscess scrotal abscess  Final Report (03-11-22 @ 21:20):    Moderate Methicillin Resistant Staphylococcus aureus    Rare Serratia marcescens    Few Bacteroides fragilis "Susceptibilities not performed"  Organism: Methicillin resistant Staphylococcus aureus  Serratia marcescens (03-11-22 @ 21:20)  Organism: Serratia marcescens (03-11-22 @ 21:20)    Sensitivities:      -  Amikacin: S <=16      -  Amoxicillin/Clavulanic Acid: R >16/8      -  Ampicillin: R 16 These ampicillin results predict results for amoxicillin      -  Ampicillin/Sulbactam: R 8/4 Enterobacter, Klebsiella aerogenes, Citrobacter, and Serratia may develop resistance during prolonged therapy (3-4 days)      -  Aztreonam: S <=4      -  Cefazolin: R >16 Enterobacter, Klebsiella aerogenes, Citrobacter, and Serratia may develop resistance during prolonged therapy (3-4 days)      -  Cefepime: S <=2      -  Cefoxitin: R <=8      -  Ceftriaxone: S <=1 Enterobacter, Klebsiella aerogenes, Citrobacter, and Serratia may develop resistance during prolonged therapy      -  Ciprofloxacin: R >2      -  Ertapenem: S <=0.5      -  Gentamicin: S <=2      -  Levofloxacin: R >4      -  Meropenem: S <=1      -  Piperacillin/Tazobactam: S <=8      -  Tobramycin: S <=2      -  Trimethoprim/Sulfamethoxazole: S 1/19      Method Type: RYAN  Organism: Methicillin resistant Staphylococcus aureus (03-11-22 @ 21:20)    Sensitivities:      -  Ampicillin/Sulbactam: R <=8/4      -  Cefazolin: R <=4      -  Clindamycin: R >4      -  Daptomycin: S 1      -  Erythromycin: R >4      -  Gentamicin: S <=1 Should not be used as monotherapy      -  Linezolid: S 2      -  Oxacillin: R >2      -  Penicillin: R >8      -  Rifampin: S <=1 Should not be used as monotherapy      -  Tetra/Doxy: S 2      -  Trimethoprim/Sulfamethoxazole: S <=0.5/9.5      -  Vancomycin: S 2      Method Type: RYAN    Culture - Urine (collected 03-08-22 @ 22:16)  Source: Clean Catch Clean Catch (Midstream)  Final Report (03-09-22 @ 18:06):    No growth    Culture - Blood (collected 03-08-22 @ 12:28)  Source: .Blood Blood-Peripheral  Final Report (03-13-22 @ 13:01):    No growth at 5 days.    Culture - Blood (collected 03-08-22 @ 12:27)  Source: .Blood Blood-Peripheral  Final Report (03-13-22 @ 13:00):    No growth at 5 days.      Creatinine, Serum: 0.86 mg/dL (03-16-22 @ 05:57)  Creatinine, Serum: 0.83 mg/dL (03-15-22 @ 05:52)  Creatinine, Serum: 0.90 mg/dL (03-14-22 @ 05:47)  Creatinine, Serum: 0.91 mg/dL (03-13-22 @ 11:29)  Creatinine, Serum: 1.04 mg/dL (03-12-22 @ 07:54)    Procalcitonin, Serum: 0.63 ng/mL (03-14-22 @ 05:47)          WBC Count: 19.75 K/uL (03-16-22 @ 05:57)  WBC Count: 19.47 K/uL (03-15-22 @ 05:52)  WBC Count: 20.38 K/uL (03-14-22 @ 05:47)  WBC Count: 15.12 K/uL (03-13-22 @ 11:29)  WBC Count: 10.89 K/uL (03-12-22 @ 07:54)    COVID-19 PCR: NotDetec (03-14-22 @ 06:45)  SARS-CoV-2 Result: NotDetec (03-08-22 @ 12:34)

## 2022-03-23 NOTE — DISCHARGE NOTE PROVIDER - NSDCMRMEDTOKEN_GEN_ALL_CORE_FT
aspirin 81 mg oral delayed release tablet: 1 tab(s) orally once a day  clindamycin 300 mg oral capsule: 1 cap(s) orally 2 times a day (with meals)   ertapenem 1 g injection: 1 gram(s) intravenously every 24 hours through 4/4 weekly cbc cmp  hydroCHLOROthiazide 25 mg oral tablet: 1 tab(s) orally once a day  losartan 100 mg oral tablet: 1 tab(s) orally once a day  Metoprolol Tartrate 100 mg oral tablet: 1 tab(s) orally once a day  montelukast 10 mg oral tablet: 1 tab(s) orally once a day  oxyCODONE 5 mg oral tablet: 1 tab(s) orally every 6 hours MDD:4 tabs  simvastatin 40 mg oral tablet: 1 tab(s) orally once a day (at bedtime)  tamsulosin 0.4 mg oral capsule: 1 cap(s) orally once a day   acetaminophen 325 mg oral tablet: 2 tab(s) orally every 6 hours, As needed, Temp greater or equal to 38C (100.4F), Mild Pain (1 - 3)  aspirin 81 mg oral delayed release tablet: 1 tab(s) orally once a day  collagenase 250 units/g topical ointment: 1 application topically once a day  ertapenem 1 g injection: 1 gram(s) intravenously every 24 hours through 4/4 weekly cbc cmp  hydroCHLOROthiazide 25 mg oral tablet: 1 tab(s) orally once a day  linezolid 600 mg oral tablet: 1 tab(s) orally every 12 hours  losartan 100 mg oral tablet: 1 tab(s) orally once a day  Metoprolol Tartrate 100 mg oral tablet: 1 tab(s) orally once a day  montelukast 10 mg oral tablet: 1 tab(s) orally once a day  Multiple Vitamins oral tablet: 1 tab(s) orally once a day  oxyCODONE 5 mg oral tablet: 1 tab(s) orally every 4 hours, As needed, Moderate Pain (4 - 6) MDD:6  simvastatin 40 mg oral tablet: 1 tab(s) orally once a day (at bedtime)  tamsulosin 0.4 mg oral capsule: 1 cap(s) orally once a day

## 2022-03-23 NOTE — DISCHARGE NOTE PROVIDER - CARE PROVIDER_API CALL
Damián Parekh)  Urology  [address]  [place] [address]  Phone: [phone]  Fax: [phone]  Follow Up Time: 1 week

## 2022-03-23 NOTE — PROGRESS NOTE ADULT - SUBJECTIVE AND OBJECTIVE BOX
Subjective:66yMale with scrotal abscess, s/p I&D 3/9, SPT 3/16.  pt remains afebrile, feels well, has been getting OOB and ambulating with walker. +BM.     SPT: yellow urine    Vital Signs Last 24 Hrs  T(C): 37.1 (23 Mar 2022 09:00), Max: 37.1 (22 Mar 2022 16:37)  T(F): 98.7 (23 Mar 2022 09:00), Max: 98.8 (22 Mar 2022 16:37)  HR: 91 (23 Mar 2022 09:00) (76 - 95)  BP: 127/83 (23 Mar 2022 09:00) (113/72 - 150/94)  BP(mean): --  RR: 18 (23 Mar 2022 09:00) (18 - 20)  SpO2: 97% (23 Mar 2022 09:00) (91% - 97%)  I&O's Detail    22 Mar 2022 07:01  -  23 Mar 2022 07:00  --------------------------------------------------------  IN:  Total IN: 0 mL    OUT:    Indwelling Catheter - Suprapubic (mL): 950 mL    Voided (mL): 1000 mL  Total OUT: 1950 mL    Total NET: -1950 mL      23 Mar 2022 07:01  -  23 Mar 2022 10:25  --------------------------------------------------------  IN:  Total IN: 0 mL    OUT:    Indwelling Catheter - Suprapubic (mL): 350 mL  Total OUT: 350 mL    Total NET: -350 mL          Labs:                        9.7    8.81  )-----------( 621      ( 22 Mar 2022 06:34 )             30.8     03-22    135  |  98  |  17.5  ----------------------------<  102<H>  4.8   |  28.0  |  0.88    Ca    8.9      22 Mar 2022 01:07  Phos  4.0     03-22  Mg     1.9     03-22

## 2022-03-23 NOTE — PROGRESS NOTE ADULT - ASSESSMENT
65 yo male s/p I&D scrotal abscess POD#14, SPT placed POD#7, s/p bedside debridement of scrotal eschar yesterday  - dressings changed  - pt remains afebrile  - changed abx to PO zyvox, invanz  - pt planning for home with wound care and IVabx tomorrow  - pt understands and agrees with plan

## 2022-03-23 NOTE — CHART NOTE - NSCHARTNOTEFT_GEN_A_CORE
65 yo male admitted with sepsis, ARF, scrotal abscess, s/p I&D abscess, SPT placement    Current Diet: Diet, Regular:   DASH/TLC {Sodium & Cholesterol Restricted} (DASH) (03-09-22 @ 12:57)      Patient reports [ ] nausea  [ ] vomiting [ ] diarrhea [ ] constipation  [ ]chewing problems [ ] swallowing issues  [ ] other:     PO intake:  < 50% [ ]   50-75%  [ ]   %  [x ]  other :    Source for PO intake [ x] Patient [ ] family [ ] chart [ ] staff [ ] other        Current Weight:   3/8 126.5 lbs    % Weight Change     Pertinent Medications: MEDICATIONS  (STANDING):  acetaminophen     Tablet .. 650 milliGRAM(s) Oral every 6 hours  albuterol/ipratropium for Nebulization 3 milliLiter(s) Nebulizer every 6 hours  BACItracin   Ointment 1 Application(s) Topical every 6 hours  chlorhexidine 2% Cloths 1 Application(s) Topical daily  collagenase Ointment 1 Application(s) Topical daily  ertapenem  IVPB 1000 milliGRAM(s) IV Intermittent every 24 hours  FIRST- Mouthwash  BLM 10 milliLiter(s) Swish and Swallow two times a day  heparin   Injectable 7500 Unit(s) SubCutaneous every 8 hours  influenza  Vaccine (HIGH DOSE) 0.7 milliLiter(s) IntraMuscular once  losartan 50 milliGRAM(s) Oral daily  montelukast 10 milliGRAM(s) Oral daily  polyethylene glycol 3350 17 Gram(s) Oral daily  senna 2 Tablet(s) Oral at bedtime  simvastatin 40 milliGRAM(s) Oral at bedtime  tamsulosin 0.4 milliGRAM(s) Oral at bedtime  vancomycin  IVPB 1250 milliGRAM(s) IV Intermittent every 12 hours    MEDICATIONS  (PRN):  acetaminophen     Tablet .. 650 milliGRAM(s) Oral every 6 hours PRN Temp greater or equal to 38C (100.4F), Mild Pain (1 - 3)  oxyCODONE    IR 10 milliGRAM(s) Oral every 4 hours PRN Severe Pain (7 - 10)  oxyCODONE    IR 5 milliGRAM(s) Oral every 4 hours PRN Moderate Pain (4 - 6)    Pertinent Labs: CBC Full  -  ( 22 Mar 2022 06:34 )  WBC Count : 8.81 K/uL  RBC Count : 2.92 M/uL  Hemoglobin : 9.7 g/dL  Hematocrit : 30.8 %  Platelet Count - Automated : 621 K/uL  Mean Cell Volume : 105.5 fl  Mean Cell Hemoglobin : 33.2 pg  Mean Cell Hemoglobin Concentration : 31.5 gm/dL  Auto Neutrophil # : x  Auto Lymphocyte # : x  Auto Monocyte # : x  Auto Eosinophil # : x  Auto Basophil # : x  Auto Neutrophil % : x  Auto Lymphocyte % : x  Auto Monocyte % : x  Auto Eosinophil % : x  Auto Basophil % : x  03-22 Na135 mmol/L Glu 102 mg/dL<H> K+ 4.8 mmol/L Cr  0.88 mg/dL BUN 17.5 mg/dL Phos 4.0 mg/dL Alb n/a   PAB n/a               Skin: sx incision to scrotum   Edema : nonpitting scrotum       Estimated Needs:   [ x] no change since previous assessment  [ ] recalculated:     Current Nutrition Diagnosis: Pt presents at risk secondary to increased protein calorie needs, related to increased  physiologic demand stress factor, as evidenced by scrotal abscess, s/p I&D abscess. PO intake remains good HBV protein foods encouraged.    Recommendations:   1) Continue Diet  2) RX MVI, Vit C  3) Obtain current weight, trend weekly     Monitoring and Evaluation:   [ ] PO intake [ ] Tolerance to diet prescription [X] Weights  [X] Follow up per protocol [X] Labs:
Anterior scrotal eschar debrided sharply 5x5cm, full thickness  see procedure note  right scrotum wound less deep, packed with wet to dry  pt tolerated procedure well  will d/c silvadene, start santyl to anterior scrotal wound daily
Nutrition Note: Aware pt downgraded from ICU. Chart reviewed; RD to follow up with full nutrition assessment per medical floor protocol.
SICU TRANSFER NOTE  -----------------------------  ICU Admission Date: 3/8    Transfer Date: 03-10-22 @ 13:04    Admission Diagnosis: scrotal abscess    Active Problems/injuries: scrotal abscess s/p washout    Procedures: XXXXX INCLUDE DATES    Consultants:    Medications  acetaminophen     Tablet .. 650 milliGRAM(s) Oral every 6 hours PRN  acetaminophen     Tablet .. 650 milliGRAM(s) Oral every 6 hours  albuterol/ipratropium for Nebulization 3 milliLiter(s) Nebulizer every 6 hours  BACItracin   Ointment 1 Application(s) Topical every 6 hours  cefepime   IVPB 1000 milliGRAM(s) IV Intermittent every 12 hours  chlorhexidine 2% Cloths 1 Application(s) Topical daily  heparin   Injectable 7500 Unit(s) SubCutaneous every 8 hours  influenza  Vaccine (HIGH DOSE) 0.7 milliLiter(s) IntraMuscular once  metroNIDAZOLE  IVPB 500 milliGRAM(s) IV Intermittent every 8 hours  oxyCODONE    IR 5 milliGRAM(s) Oral every 4 hours PRN  oxyCODONE    IR 10 milliGRAM(s) Oral every 4 hours PRN  potassium chloride    Tablet ER 40 milliEquivalent(s) Oral every 4 hours  vancomycin  IVPB 750 milliGRAM(s) IV Intermittent every 12 hours    [x] I attest I have reviewed and reconciled all medications prior to transfer    IV Fluids  lactated ringers Bolus:   2000 milliLiter(s), IV Bolus, once, infuse over 60 Minute(s), Stop After 1 Doses     (Calc Info: 31 milliLiter(s)/Kg (ideal)/DOSE x 63.76 Kg (ideal) = 2,000 milliLiter(s)/Dose     (Requested dose was 31 milliLiter(s) per Kg (ideal))    Indication: hydration    Antibiotics:  cefepime   IVPB 1000 milliGRAM(s) IV Intermittent every 12 hours  metroNIDAZOLE  IVPB 500 milliGRAM(s) IV Intermittent every 8 hours  vancomycin  IVPB 750 milliGRAM(s) IV Intermittent every 12 hours    Indication: scrotal abcess     Culture - Fungal, Other (collected 03-09-22 @ 21:49)  Source: .Other scrotal abscess  Preliminary Report (03-10-22 @ 11:09):    Testing in progress    I have discussed this case with TA Lerner upon transfer and all questions regarding ICU course were answered.  The following items are to be followed up:  - pt HD stable  - tolerating diet  - hweeler remains in place  - vanc/cefepime/flagy  - care per primary team
67 y/o male s/p recent right orchiectomy, presented with right scrotal abscess, sepsis, SAVANNA, s/p I&D right scrotal abscess. He is c/o 10/ 10 scrotal pain despite receiving his oxycodone and tylenol ~ 1 hour prior with minimal relief. Pt. last received his pain medication at 1am.  O: Pt. appears to be in pain  VS: T. 98.7, / 98, , R 19  Focused exam:  + edematous penis and scrotum, superficial slough of epithelium of anterior scrotum noted (not new per previous notes), penrose is noted in place, and pt. has an abd pad to scrotum.   A/P: R. scrotal abscess, s/p recent right orchiectomy with pain (exam appears to be same, no change in presentation)  - Morphine 2mg IV x 1 ord  *  made aware and saw/ examined pt. just prior to morphine administration.
67 yo male POD#1 s/p I&D scrotal abscess, sepsis, SAVANNA, downgraded from the SICU with no acute events. Patient in good spirits, wheeler in place with good uop, VSS, SAVANNA resolving. PT evaluation pending  Plan:  - creatinine continues to improves  - cont IV hydration  - cont abx  - f/u cx's  - wound care daily, bacitracin, xeroform, gauze to scrotum  - OOB to chair  - PT eval
Wound care:  Right scrotal wound [id number]- cleanse with NS, wet to dry packing daily  right anterior scrotal wound 5x5cm- cleanse with NS, apply santyl ointment daily  elevate scrotum and penis on rolled up towel while in bed    SPT in place- draining well, does not need to be flushed  cleanse around SPT site daily

## 2022-03-24 ENCOUNTER — TRANSCRIPTION ENCOUNTER (OUTPATIENT)
Age: 67
End: 2022-03-24

## 2022-03-24 VITALS
SYSTOLIC BLOOD PRESSURE: 120 MMHG | RESPIRATION RATE: 18 BRPM | OXYGEN SATURATION: 91 % | DIASTOLIC BLOOD PRESSURE: 77 MMHG | TEMPERATURE: 99 F | HEART RATE: 97 BPM

## 2022-03-24 PROCEDURE — 80053 COMPREHEN METABOLIC PANEL: CPT

## 2022-03-24 PROCEDURE — 71260 CT THORAX DX C+: CPT

## 2022-03-24 PROCEDURE — U0005: CPT

## 2022-03-24 PROCEDURE — 82330 ASSAY OF CALCIUM: CPT

## 2022-03-24 PROCEDURE — 85025 COMPLETE CBC W/AUTO DIFF WBC: CPT

## 2022-03-24 PROCEDURE — 72193 CT PELVIS W/DYE: CPT

## 2022-03-24 PROCEDURE — 99291 CRITICAL CARE FIRST HOUR: CPT | Mod: 25

## 2022-03-24 PROCEDURE — 85014 HEMATOCRIT: CPT

## 2022-03-24 PROCEDURE — 83605 ASSAY OF LACTIC ACID: CPT

## 2022-03-24 PROCEDURE — 85027 COMPLETE CBC AUTOMATED: CPT

## 2022-03-24 PROCEDURE — 94760 N-INVAS EAR/PLS OXIMETRY 1: CPT

## 2022-03-24 PROCEDURE — 76770 US EXAM ABDO BACK WALL COMP: CPT

## 2022-03-24 PROCEDURE — 85730 THROMBOPLASTIN TIME PARTIAL: CPT

## 2022-03-24 PROCEDURE — 82435 ASSAY OF BLOOD CHLORIDE: CPT

## 2022-03-24 PROCEDURE — 36569 INSJ PICC 5 YR+ W/O IMAGING: CPT

## 2022-03-24 PROCEDURE — 87040 BLOOD CULTURE FOR BACTERIA: CPT

## 2022-03-24 PROCEDURE — 87075 CULTR BACTERIA EXCEPT BLOOD: CPT

## 2022-03-24 PROCEDURE — C1769: CPT

## 2022-03-24 PROCEDURE — 87641 MR-STAPH DNA AMP PROBE: CPT

## 2022-03-24 PROCEDURE — 71045 X-RAY EXAM CHEST 1 VIEW: CPT

## 2022-03-24 PROCEDURE — 87070 CULTURE OTHR SPECIMN AEROBIC: CPT

## 2022-03-24 PROCEDURE — 87640 STAPH A DNA AMP PROBE: CPT

## 2022-03-24 PROCEDURE — 82947 ASSAY GLUCOSE BLOOD QUANT: CPT

## 2022-03-24 PROCEDURE — 87205 SMEAR GRAM STAIN: CPT

## 2022-03-24 PROCEDURE — 82803 BLOOD GASES ANY COMBINATION: CPT

## 2022-03-24 PROCEDURE — 94640 AIRWAY INHALATION TREATMENT: CPT

## 2022-03-24 PROCEDURE — 85018 HEMOGLOBIN: CPT

## 2022-03-24 PROCEDURE — 80048 BASIC METABOLIC PNL TOTAL CA: CPT

## 2022-03-24 PROCEDURE — C1751: CPT

## 2022-03-24 PROCEDURE — 36415 COLL VENOUS BLD VENIPUNCTURE: CPT

## 2022-03-24 PROCEDURE — 74177 CT ABD & PELVIS W/CONTRAST: CPT

## 2022-03-24 PROCEDURE — 76942 ECHO GUIDE FOR BIOPSY: CPT

## 2022-03-24 PROCEDURE — 85610 PROTHROMBIN TIME: CPT

## 2022-03-24 PROCEDURE — 87186 SC STD MICRODIL/AGAR DIL: CPT

## 2022-03-24 PROCEDURE — 93005 ELECTROCARDIOGRAM TRACING: CPT

## 2022-03-24 PROCEDURE — 87086 URINE CULTURE/COLONY COUNT: CPT

## 2022-03-24 PROCEDURE — C1729: CPT

## 2022-03-24 PROCEDURE — 87102 FUNGUS ISOLATION CULTURE: CPT

## 2022-03-24 PROCEDURE — 82962 GLUCOSE BLOOD TEST: CPT

## 2022-03-24 PROCEDURE — 84132 ASSAY OF SERUM POTASSIUM: CPT

## 2022-03-24 PROCEDURE — 97163 PT EVAL HIGH COMPLEX 45 MIN: CPT

## 2022-03-24 PROCEDURE — 87077 CULTURE AEROBIC IDENTIFY: CPT

## 2022-03-24 PROCEDURE — 80202 ASSAY OF VANCOMYCIN: CPT

## 2022-03-24 PROCEDURE — U0003: CPT

## 2022-03-24 PROCEDURE — 83735 ASSAY OF MAGNESIUM: CPT

## 2022-03-24 PROCEDURE — 81001 URINALYSIS AUTO W/SCOPE: CPT

## 2022-03-24 PROCEDURE — 86703 HIV-1/HIV-2 1 RESULT ANTBDY: CPT

## 2022-03-24 PROCEDURE — 84100 ASSAY OF PHOSPHORUS: CPT

## 2022-03-24 PROCEDURE — 76870 US EXAM SCROTUM: CPT

## 2022-03-24 PROCEDURE — C9399: CPT

## 2022-03-24 PROCEDURE — 72192 CT PELVIS W/O DYE: CPT | Mod: MA

## 2022-03-24 PROCEDURE — 84295 ASSAY OF SERUM SODIUM: CPT

## 2022-03-24 PROCEDURE — 87637 SARSCOV2&INF A&B&RSV AMP PRB: CPT

## 2022-03-24 PROCEDURE — 97116 GAIT TRAINING THERAPY: CPT

## 2022-03-24 PROCEDURE — 84145 PROCALCITONIN (PCT): CPT

## 2022-03-24 PROCEDURE — 75984 XRAY CONTROL CATHETER CHANGE: CPT

## 2022-03-24 RX ORDER — ACETAMINOPHEN 500 MG
2 TABLET ORAL
Qty: 0 | Refills: 0 | DISCHARGE
Start: 2022-03-24

## 2022-03-24 RX ORDER — LINEZOLID 600 MG/300ML
1 INJECTION, SOLUTION INTRAVENOUS
Qty: 22 | Refills: 0
Start: 2022-03-24 | End: 2022-04-03

## 2022-03-24 RX ORDER — OXYCODONE HYDROCHLORIDE 5 MG/1
1 TABLET ORAL
Qty: 24 | Refills: 0
Start: 2022-03-24 | End: 2022-03-27

## 2022-03-24 RX ORDER — COLLAGENASE CLOSTRIDIUM HIST. 250 UNIT/G
1 OINTMENT (GRAM) TOPICAL
Qty: 90 | Refills: 2
Start: 2022-03-24

## 2022-03-24 RX ORDER — OXYCODONE HYDROCHLORIDE 5 MG/1
5 TABLET ORAL EVERY 4 HOURS
Refills: 0 | Status: DISCONTINUED | OUTPATIENT
Start: 2022-03-24 | End: 2022-03-24

## 2022-03-24 RX ORDER — OXYCODONE HYDROCHLORIDE 5 MG/1
10 TABLET ORAL EVERY 4 HOURS
Refills: 0 | Status: DISCONTINUED | OUTPATIENT
Start: 2022-03-24 | End: 2022-03-24

## 2022-03-24 RX ADMIN — Medication 650 MILLIGRAM(S): at 14:10

## 2022-03-24 RX ADMIN — Medication 3 MILLILITER(S): at 05:32

## 2022-03-24 RX ADMIN — DIPHENHYDRAMINE HYDROCHLORIDE AND LIDOCAINE HYDROCHLORIDE AND ALUMINUM HYDROXIDE AND MAGNESIUM HYDRO 10 MILLILITER(S): KIT at 06:35

## 2022-03-24 RX ADMIN — OXYCODONE HYDROCHLORIDE 10 MILLIGRAM(S): 5 TABLET ORAL at 06:33

## 2022-03-24 RX ADMIN — MONTELUKAST 10 MILLIGRAM(S): 4 TABLET, CHEWABLE ORAL at 14:10

## 2022-03-24 RX ADMIN — OXYCODONE HYDROCHLORIDE 10 MILLIGRAM(S): 5 TABLET ORAL at 02:43

## 2022-03-24 RX ADMIN — Medication 1 APPLICATION(S): at 06:33

## 2022-03-24 RX ADMIN — ERTAPENEM SODIUM 120 MILLIGRAM(S): 1 INJECTION, POWDER, LYOPHILIZED, FOR SOLUTION INTRAMUSCULAR; INTRAVENOUS at 14:26

## 2022-03-24 RX ADMIN — OXYCODONE HYDROCHLORIDE 10 MILLIGRAM(S): 5 TABLET ORAL at 02:02

## 2022-03-24 RX ADMIN — CHLORHEXIDINE GLUCONATE 1 APPLICATION(S): 213 SOLUTION TOPICAL at 14:11

## 2022-03-24 RX ADMIN — HEPARIN SODIUM 7500 UNIT(S): 5000 INJECTION INTRAVENOUS; SUBCUTANEOUS at 06:34

## 2022-03-24 RX ADMIN — OXYCODONE HYDROCHLORIDE 10 MILLIGRAM(S): 5 TABLET ORAL at 11:39

## 2022-03-24 RX ADMIN — Medication 650 MILLIGRAM(S): at 06:34

## 2022-03-24 RX ADMIN — Medication 1 APPLICATION(S): at 14:11

## 2022-03-24 RX ADMIN — Medication 500 MILLIGRAM(S): at 14:10

## 2022-03-24 RX ADMIN — Medication 1 TABLET(S): at 14:10

## 2022-03-24 RX ADMIN — LOSARTAN POTASSIUM 50 MILLIGRAM(S): 100 TABLET, FILM COATED ORAL at 06:33

## 2022-03-24 RX ADMIN — OXYCODONE HYDROCHLORIDE 10 MILLIGRAM(S): 5 TABLET ORAL at 12:30

## 2022-03-24 RX ADMIN — LINEZOLID 600 MILLIGRAM(S): 600 INJECTION, SOLUTION INTRAVENOUS at 06:34

## 2022-03-24 NOTE — PROGRESS NOTE ADULT - PROVIDER SPECIALTY LIST ADULT
Hospitalist
Infectious Disease
Infectious Disease
SICU
SICU
Urology
Urology
Hospitalist
Infectious Disease
Infectious Disease
Intervent Radiology
Urology
Hospitalist
Infectious Disease
Infectious Disease
Urology

## 2022-03-24 NOTE — DISCHARGE NOTE NURSING/CASE MANAGEMENT/SOCIAL WORK - NSDCPEWEB_GEN_ALL_CORE
[facility] for Tobacco Control website --- [email or web address] website --- [email or web address]

## 2022-03-24 NOTE — PROGRESS NOTE ADULT - GASTROINTESTINAL DETAILS
soft/nontender
soft/nontender/no distention
soft/nontender

## 2022-03-24 NOTE — PROGRESS NOTE ADULT - ASSESSMENT
65 yo male admitted with scrotal abscess, s/p I&D, SPT placement, IV abx   - pt clinically improved ans well  - dressing to be changed today  - d/c home today with wound care and IVabx  - pt instructed that he may shower prior to having wound care performed  - instructed pt to call office if he does not feel well or has fevers- pt understands and agrees

## 2022-03-24 NOTE — PROGRESS NOTE ADULT - RS GEN PE MLT RESP DETAILS PC
respirations non-labored

## 2022-03-24 NOTE — PROGRESS NOTE ADULT - CONSTITUTIONAL DETAILS
no distress/obese
no distress
no distress/obese
no distress/obese

## 2022-03-24 NOTE — PROGRESS NOTE ADULT - SUBJECTIVE AND OBJECTIVE BOX
Subjective:66yMale resting comfortably, appears well, states he feels well. Pt continues to get OOB and ambulate, participated with PT, remains afebrile. Pt has minimal c/o pain in his scrotum or penis at this time.    SPT: clear yellow    Vital Signs Last 24 Hrs  T(C): 37.2 (24 Mar 2022 08:00), Max: 37.2 (24 Mar 2022 08:00)  T(F): 99 (24 Mar 2022 08:00), Max: 99 (24 Mar 2022 08:00)  HR: 97 (24 Mar 2022 08:00) (75 - 98)  BP: 120/77 (24 Mar 2022 08:00) (116/76 - 148/81)  BP(mean): --  RR: 18 (24 Mar 2022 08:00) (18 - 18)  SpO2: 91% (24 Mar 2022 08:00) (91% - 99%)  I&O's Detail    23 Mar 2022 07:01  -  24 Mar 2022 07:00  --------------------------------------------------------  IN:    Oral Fluid: 560 mL  Total IN: 560 mL    OUT:    Indwelling Catheter - Suprapubic (mL): 2125 mL  Total OUT: 2125 mL    Total NET: -1565 mL          Labs:

## 2022-03-24 NOTE — PROGRESS NOTE ADULT - NS PANP OPT1 GEN_ALL_CORE
I attest my time as PA/NP is greater than 50% of the total combined time spent on qualifying patient care activities by the PA/NP and attending.

## 2022-03-24 NOTE — DISCHARGE NOTE NURSING/CASE MANAGEMENT/SOCIAL WORK - NSDCPEFALRISK_GEN_ALL_CORE
For information on Fall & Injury Prevention, visit: [email or web address] [place]  [email or web address] [place]  https://www.cdc.gov/steadi/patient.html

## 2022-03-24 NOTE — PROGRESS NOTE ADULT - GENITOURINARY COMMENTS
decreased distal penile swelling, less tender, spuerficial anterior scrotal escar without any drainage, stable, wound edges demarcated, scrotal wound cleansed and repacked
penile edema, less tender today, scrotal wound irrigated and probed, no drainage, penrose in place, superficial eschar wound edges demarcating
edematous penis and scrotum, decreased tenderness of scrotum, penrose in place, dressing changed, xeroform, gauze, abd pads applied, wheeler in place
edematous penis and scrotum, scrotum tender to palpation, mild drainage from incision, +erythema
edematous penis and scrotum, superficial slough of epithelium of anterior scrotum, penrose in place, abscess irrigated with NS, dressing changed, xeroform, gauze, abd pad to scrotum
scrotal wound irrigated with betadine and saline, penrose drain removed, packed with gauze, + penile edema, SPT in place, draining well, anterior scrotal eschar, clean, wound edges demarcated, some granulation tissue, silvadene applied
penile edema slightly worse, small necrotic wound distally, no drainage, pitting edema, scrotum with less edema, wound irrigated again with betadine and NS, minimal drainage from wound, anterior scrotal eschar wound edges demarcating
penile pitting edema, small ulceration of penile shaft near wheeler cath, scrotum less edematous, minimal drainage from scrotal wound, clean base, 5x5cm area on anterior scrotum with escar, wound edges slowly demarcating, dressing changed
penile edema, wheeler in place, decreased scrotal edema, minimal drainage from wound at penrose site, anterior scrotum with superficial necrosis of skin, 5x5cm
dressing intact, minimal drainage
less penile edema, nontender, right scrotal wound less deep, repacked with wet to dry, anterior scrotum with fibrous exudate, santyl ointment applied, scroum and penis elevated
decreased penile edema, scrotal wound packed, anterior scrotum with eschar

## 2022-03-24 NOTE — DISCHARGE NOTE NURSING/CASE MANAGEMENT/SOCIAL WORK - PATIENT PORTAL LINK FT
You can access the FollowMyHealth Patient Portal offered by [facility] by registering at the following website: [email or web address]. By joining [employer]’s FollowMyHealth portal, you will also be able to view your health information using other applications (apps) compatible with our system.

## 2022-03-24 NOTE — DISCHARGE NOTE NURSING/CASE MANAGEMENT/SOCIAL WORK - NSDCPEEMAIL_GEN_ALL_CORE
[facility] for Tobacco Control email [email or web address]

## 2022-03-29 ENCOUNTER — APPOINTMENT (OUTPATIENT)
Dept: UROLOGY | Facility: CLINIC | Age: 67
End: 2022-03-29
Payer: MEDICARE

## 2022-03-29 VITALS — SYSTOLIC BLOOD PRESSURE: 122 MMHG | DIASTOLIC BLOOD PRESSURE: 82 MMHG | HEART RATE: 89 BPM

## 2022-03-29 DIAGNOSIS — N32.89 OTHER SPECIFIED DISORDERS OF BLADDER: ICD-10-CM

## 2022-03-29 PROCEDURE — 99024 POSTOP FOLLOW-UP VISIT: CPT

## 2022-04-01 ENCOUNTER — NON-APPOINTMENT (OUTPATIENT)
Age: 67
End: 2022-04-01

## 2022-04-01 ENCOUNTER — APPOINTMENT (OUTPATIENT)
Dept: UROLOGY | Facility: CLINIC | Age: 67
End: 2022-04-01
Payer: MEDICARE

## 2022-04-01 VITALS — HEART RATE: 113 BPM | DIASTOLIC BLOOD PRESSURE: 90 MMHG | SYSTOLIC BLOOD PRESSURE: 144 MMHG

## 2022-04-01 DIAGNOSIS — R39.9 UNSPECIFIED SYMPTOMS AND SIGNS INVOLVING THE GENITOURINARY SYSTEM: ICD-10-CM

## 2022-04-01 PROCEDURE — 99024 POSTOP FOLLOW-UP VISIT: CPT

## 2022-04-03 PROBLEM — R39.9 LOWER URINARY TRACT SYMPTOMS (LUTS): Status: ACTIVE | Noted: 2021-10-12

## 2022-04-03 NOTE — HISTORY OF PRESENT ILLNESS
[FreeTextEntry1] : patient only seen recently\par recovering and wound improving\par \par complaining of severe bladder spasms due to the suprapubic tube\par wishes for the tube to be removed\par \par patient never had urinary retention, and the tube was inserted as part of the treatment for his sepsis and wound\par \par tube was removed at bedside with ease.\par patient feeling well.\par instructed to keep good hygiene and contact us with any concerns

## 2022-04-04 ENCOUNTER — APPOINTMENT (OUTPATIENT)
Dept: INTERNAL MEDICINE | Facility: CLINIC | Age: 67
End: 2022-04-04
Payer: MEDICARE

## 2022-04-04 VITALS
SYSTOLIC BLOOD PRESSURE: 110 MMHG | HEIGHT: 68 IN | WEIGHT: 280 LBS | DIASTOLIC BLOOD PRESSURE: 70 MMHG | BODY MASS INDEX: 42.44 KG/M2

## 2022-04-04 DIAGNOSIS — Z09 ENCOUNTER FOR FOLLOW-UP EXAMINATION AFTER COMPLETED TREATMENT FOR CONDITIONS OTHER THAN MALIGNANT NEOPLASM: ICD-10-CM

## 2022-04-04 DIAGNOSIS — Z79.2 LONG TERM (CURRENT) USE OF ANTIBIOTICS: ICD-10-CM

## 2022-04-04 PROCEDURE — 99215 OFFICE O/P EST HI 40 MIN: CPT

## 2022-04-04 NOTE — REASON FOR VISIT
[Post Hospitalization] : a post hospitalization visit [Family Member] : family member [FreeTextEntry1] : SCROTAL ABSCESS

## 2022-04-04 NOTE — ASSESSMENT
[FreeTextEntry1] : 67 YO MALE  S/P RIGHT ORCHIECTOMY ON 3/1 ADMITTED 3/8 \par WITH SCROTAL BASES  PT S/P I AND D ON 3/10 WITH LEUKOCYTOSIS AND PENILE EDEMA\par PT TREATED WITH IV ABX  UNDERWENT PLACEMENT OF SUPRAPUBIC TUBE \par AND WAS DISCHARGE DON  IV INVANZ AND ORAL ZYVOX\par PT HAS COMPLETED 2 WEEKS OF IV ABX HERE FOR FOLLOWUP WITH   SISTER\par PT AFEBRILE NON TOXIC\par BLOOD WORK AT HOME REVIEWED WBC WITHIN NORMAL LIMITS\par PHYSICAL EXAM  SCROTUM WITH OPEN  WOUND APPEARS CLEAN NO ODOR \par MIDLINE REMOVED\par WILL RECOMMEND  CHANGE TO ORAL BACTRIM FOR ONE MORE WEEK FOR SOFT TISSUE COVERAGE\par FOLLOW UP WITH UROLOGY FOLLOW UP WITH ME PRN\par WILL NOTIFY HOME CARE  AGENCY THAT MIDLINE REMOVED \par ANSWERED ALL QUESTIONS

## 2022-04-04 NOTE — PHYSICAL EXAM
[General Appearance - Alert] : alert [General Appearance - In No Acute Distress] : in no acute distress [Sclera] : the sclera and conjunctiva were normal [PERRL With Normal Accommodation] : pupils were equal in size, round, reactive to light [Extraocular Movements] : extraocular movements were intact [] : no respiratory distress [Auscultation Breath Sounds / Voice Sounds] : lungs were clear to auscultation bilaterally [Heart Rate And Rhythm] : heart rate was normal and rhythm regular [Heart Sounds] : normal S1 and S2 [Heart Sounds Gallop] : no gallops [Murmurs] : no murmurs [Heart Sounds Pericardial Friction Rub] : no pericardial rub [Full Pulse] : the pedal pulses are present [Edema] : there was no peripheral edema [FreeTextEntry1] : SROTAL WOUND CLEAN NO ODOR POS GRANULTAION TISUSE PENILE EDEMA SIGNIFICANTLY DECREASED

## 2022-04-04 NOTE — HISTORY OF PRESENT ILLNESS
[FreeTextEntry1] : 65YO MALE  S/P RIGHT ORCHIECTOMY ON 3/1 ADMITTED 3/8 \par WITH SCROTAL BASES  PT S/P I AND D ON 3/10 WITH LEUKOCYTOSIS AND PENILE EDEMA\par PT TREATED WITH IV ABX  UNDERWENT PLACEMENT OF SUPRAPUBIC TUBE \par AND WAS DISCHARGE DON  IV INVANZ AND ORAL ZYVOX\par PT HAS COMPLETED 2 WEEKS OF IV ABX HERE FOR FOLLOWUP WITH   SISTER

## 2022-04-08 ENCOUNTER — APPOINTMENT (OUTPATIENT)
Dept: UROLOGY | Facility: CLINIC | Age: 67
End: 2022-04-08
Payer: MEDICARE

## 2022-04-08 PROCEDURE — 99024 POSTOP FOLLOW-UP VISIT: CPT

## 2022-04-09 LAB
CULTURE RESULTS: SIGNIFICANT CHANGE UP
SPECIMEN SOURCE: SIGNIFICANT CHANGE UP

## 2022-04-09 NOTE — HISTORY OF PRESENT ILLNESS
[FreeTextEntry1] : 67 yo M for follow up\par see all previous notes\par \par wound examined and healing\par there is some slight green coloration on part of the wound\par it does not seem infected, and no fowl smell.\par \par dressing changed\par bacitracin applied\par provided a week on levofloxacin as well\par \par will repeat visit in 2 weeks

## 2022-04-12 ENCOUNTER — APPOINTMENT (OUTPATIENT)
Dept: UROLOGY | Facility: CLINIC | Age: 67
End: 2022-04-12

## 2022-04-22 ENCOUNTER — APPOINTMENT (OUTPATIENT)
Dept: UROLOGY | Facility: CLINIC | Age: 67
End: 2022-04-22
Payer: MEDICARE

## 2022-04-22 DIAGNOSIS — N45.3 EPIDIDYMO-ORCHITIS: ICD-10-CM

## 2022-04-22 PROCEDURE — 99024 POSTOP FOLLOW-UP VISIT: CPT

## 2022-04-25 PROBLEM — N45.3 ORCHITIS AND EPIDIDYMITIS: Status: ACTIVE | Noted: 2021-10-12

## 2022-04-25 NOTE — HISTORY OF PRESENT ILLNESS
[FreeTextEntry1] : \par \par wound clean and significantly healing\par much better\par no abx\par edema have completely subsided \par \par continue same treatment and wound care\par next visit in 2-3 weeks for follow up

## 2022-05-18 ENCOUNTER — APPOINTMENT (OUTPATIENT)
Dept: UROLOGY | Facility: CLINIC | Age: 67
End: 2022-05-18
Payer: MEDICARE

## 2022-05-18 DIAGNOSIS — N49.2 INFLAMMATORY DISORDERS OF SCROTUM: ICD-10-CM

## 2022-05-18 PROCEDURE — 99024 POSTOP FOLLOW-UP VISIT: CPT

## 2022-05-18 NOTE — ASSESSMENT
[FreeTextEntry1] : \par plan:\par - continue care with no change\par - follow up in 1 month if still improving

## 2022-05-18 NOTE — PHYSICAL EXAM
[General Appearance - Well Developed] : well developed [General Appearance - Well Nourished] : well nourished [Normal Appearance] : normal appearance [Well Groomed] : well groomed [General Appearance - In No Acute Distress] : no acute distress [] : no respiratory distress [Respiration, Rhythm And Depth] : normal respiratory rhythm and effort [Exaggerated Use Of Accessory Muscles For Inspiration] : no accessory muscle use [Oriented To Time, Place, And Person] : oriented to person, place, and time [Affect] : the affect was normal [Mood] : the mood was normal [Not Anxious] : not anxious [FreeTextEntry1] : uses cane

## 2022-05-18 NOTE — HISTORY OF PRESENT ILLNESS
[FreeTextEntry1] : 66 yo M\par follow up for wound healing after complicated orchiectomy and exploration for infection\par \par wound is healing good, still small area that has not completely closed\par penis looks good, instructed to reduce the skin to prevent development of phimosis and keep the area dry and clean\par \par \par plan:\par - continue care with no change\par - follow up in 1 month if still improving

## 2022-05-31 ENCOUNTER — TRANSCRIPTION ENCOUNTER (OUTPATIENT)
Age: 67
End: 2022-05-31

## 2022-05-31 ENCOUNTER — INPATIENT (INPATIENT)
Facility: HOSPITAL | Age: 67
LOS: 2 days | Discharge: HOME | DRG: 872 | End: 2022-06-03
Attending: STUDENT IN AN ORGANIZED HEALTH CARE EDUCATION/TRAINING PROGRAM | Admitting: STUDENT IN AN ORGANIZED HEALTH CARE EDUCATION/TRAINING PROGRAM
Payer: MEDICARE

## 2022-05-31 VITALS
WEIGHT: 279.99 LBS | DIASTOLIC BLOOD PRESSURE: 105 MMHG | OXYGEN SATURATION: 96 % | SYSTOLIC BLOOD PRESSURE: 157 MMHG | TEMPERATURE: 99 F | RESPIRATION RATE: 18 BRPM | HEIGHT: 66 IN | HEART RATE: 126 BPM

## 2022-05-31 DIAGNOSIS — Z90.49 ACQUIRED ABSENCE OF OTHER SPECIFIED PARTS OF DIGESTIVE TRACT: Chronic | ICD-10-CM

## 2022-05-31 DIAGNOSIS — N20.0 CALCULUS OF KIDNEY: ICD-10-CM

## 2022-05-31 LAB
ALBUMIN SERPL ELPH-MCNC: 3.7 G/DL — SIGNIFICANT CHANGE UP (ref 3.3–5.2)
ALP SERPL-CCNC: 97 U/L — SIGNIFICANT CHANGE UP (ref 40–120)
ALT FLD-CCNC: 16 U/L — SIGNIFICANT CHANGE UP
ANION GAP SERPL CALC-SCNC: 12 MMOL/L — SIGNIFICANT CHANGE UP (ref 5–17)
APPEARANCE UR: ABNORMAL
AST SERPL-CCNC: 13 U/L — SIGNIFICANT CHANGE UP
BACTERIA # UR AUTO: ABNORMAL
BASOPHILS # BLD AUTO: 0.05 K/UL — SIGNIFICANT CHANGE UP (ref 0–0.2)
BASOPHILS NFR BLD AUTO: 0.3 % — SIGNIFICANT CHANGE UP (ref 0–2)
BILIRUB SERPL-MCNC: 0.6 MG/DL — SIGNIFICANT CHANGE UP (ref 0.4–2)
BILIRUB UR-MCNC: NEGATIVE — SIGNIFICANT CHANGE UP
BUN SERPL-MCNC: 19.2 MG/DL — SIGNIFICANT CHANGE UP (ref 8–20)
CALCIUM SERPL-MCNC: 9.4 MG/DL — SIGNIFICANT CHANGE UP (ref 8.6–10.2)
CHLORIDE SERPL-SCNC: 97 MMOL/L — LOW (ref 98–107)
CO2 SERPL-SCNC: 25 MMOL/L — SIGNIFICANT CHANGE UP (ref 22–29)
COLOR SPEC: YELLOW — SIGNIFICANT CHANGE UP
CREAT SERPL-MCNC: 0.92 MG/DL — SIGNIFICANT CHANGE UP (ref 0.5–1.3)
DIFF PNL FLD: ABNORMAL
EGFR: 90 ML/MIN/1.73M2 — SIGNIFICANT CHANGE UP
EOSINOPHIL # BLD AUTO: 0.01 K/UL — SIGNIFICANT CHANGE UP (ref 0–0.5)
EOSINOPHIL NFR BLD AUTO: 0.1 % — SIGNIFICANT CHANGE UP (ref 0–6)
EPI CELLS # UR: SIGNIFICANT CHANGE UP
GLUCOSE SERPL-MCNC: 180 MG/DL — HIGH (ref 70–99)
GLUCOSE UR QL: NEGATIVE MG/DL — SIGNIFICANT CHANGE UP
HCT VFR BLD CALC: 35.9 % — LOW (ref 39–50)
HCT VFR BLD CALC: 37.3 % — LOW (ref 39–50)
HGB BLD-MCNC: 11.7 G/DL — LOW (ref 13–17)
HGB BLD-MCNC: 12.3 G/DL — LOW (ref 13–17)
IMM GRANULOCYTES NFR BLD AUTO: 0.6 % — SIGNIFICANT CHANGE UP (ref 0–1.5)
KETONES UR-MCNC: NEGATIVE — SIGNIFICANT CHANGE UP
LEUKOCYTE ESTERASE UR-ACNC: ABNORMAL
LIDOCAIN IGE QN: 18 U/L — LOW (ref 22–51)
LYMPHOCYTES # BLD AUTO: 21.1 % — SIGNIFICANT CHANGE UP (ref 13–44)
LYMPHOCYTES # BLD AUTO: 3.56 K/UL — HIGH (ref 1–3.3)
MCHC RBC-ENTMCNC: 31.6 PG — SIGNIFICANT CHANGE UP (ref 27–34)
MCHC RBC-ENTMCNC: 31.7 PG — SIGNIFICANT CHANGE UP (ref 27–34)
MCHC RBC-ENTMCNC: 32.6 GM/DL — SIGNIFICANT CHANGE UP (ref 32–36)
MCHC RBC-ENTMCNC: 33 GM/DL — SIGNIFICANT CHANGE UP (ref 32–36)
MCV RBC AUTO: 96.1 FL — SIGNIFICANT CHANGE UP (ref 80–100)
MCV RBC AUTO: 97 FL — SIGNIFICANT CHANGE UP (ref 80–100)
MONOCYTES # BLD AUTO: 0.83 K/UL — SIGNIFICANT CHANGE UP (ref 0–0.9)
MONOCYTES NFR BLD AUTO: 4.9 % — SIGNIFICANT CHANGE UP (ref 2–14)
NEUTROPHILS # BLD AUTO: 12.36 K/UL — HIGH (ref 1.8–7.4)
NEUTROPHILS NFR BLD AUTO: 73 % — SIGNIFICANT CHANGE UP (ref 43–77)
NITRITE UR-MCNC: NEGATIVE — SIGNIFICANT CHANGE UP
PH UR: 8 — SIGNIFICANT CHANGE UP (ref 5–8)
PLATELET # BLD AUTO: 325 K/UL — SIGNIFICANT CHANGE UP (ref 150–400)
PLATELET # BLD AUTO: 355 K/UL — SIGNIFICANT CHANGE UP (ref 150–400)
POTASSIUM SERPL-MCNC: 3.7 MMOL/L — SIGNIFICANT CHANGE UP (ref 3.5–5.3)
POTASSIUM SERPL-SCNC: 3.7 MMOL/L — SIGNIFICANT CHANGE UP (ref 3.5–5.3)
PROT SERPL-MCNC: 7.7 G/DL — SIGNIFICANT CHANGE UP (ref 6.6–8.7)
PROT UR-MCNC: 100 MG/DL
RBC # BLD: 3.7 M/UL — LOW (ref 4.2–5.8)
RBC # BLD: 3.88 M/UL — LOW (ref 4.2–5.8)
RBC # FLD: 13.4 % — SIGNIFICANT CHANGE UP (ref 10.3–14.5)
RBC # FLD: 13.5 % — SIGNIFICANT CHANGE UP (ref 10.3–14.5)
RBC CASTS # UR COMP ASSIST: >50 /HPF (ref 0–4)
SARS-COV-2 RNA SPEC QL NAA+PROBE: SIGNIFICANT CHANGE UP
SODIUM SERPL-SCNC: 134 MMOL/L — LOW (ref 135–145)
SP GR SPEC: 1.01 — SIGNIFICANT CHANGE UP (ref 1.01–1.02)
TROPONIN T SERPL-MCNC: <0.01 NG/ML — SIGNIFICANT CHANGE UP (ref 0–0.06)
UROBILINOGEN FLD QL: NEGATIVE MG/DL — SIGNIFICANT CHANGE UP
WBC # BLD: 16.91 K/UL — HIGH (ref 3.8–10.5)
WBC # BLD: 19.77 K/UL — HIGH (ref 3.8–10.5)
WBC # FLD AUTO: 16.91 K/UL — HIGH (ref 3.8–10.5)
WBC # FLD AUTO: 19.77 K/UL — HIGH (ref 3.8–10.5)
WBC UR QL: >50 /HPF (ref 0–5)

## 2022-05-31 PROCEDURE — 74176 CT ABD & PELVIS W/O CONTRAST: CPT | Mod: 26,MD

## 2022-05-31 PROCEDURE — 71046 X-RAY EXAM CHEST 2 VIEWS: CPT | Mod: 26

## 2022-05-31 PROCEDURE — 99285 EMERGENCY DEPT VISIT HI MDM: CPT | Mod: GC

## 2022-05-31 PROCEDURE — 76705 ECHO EXAM OF ABDOMEN: CPT | Mod: 26

## 2022-05-31 PROCEDURE — 93010 ELECTROCARDIOGRAM REPORT: CPT

## 2022-05-31 PROCEDURE — 99222 1ST HOSP IP/OBS MODERATE 55: CPT

## 2022-05-31 DEVICE — CATH URET AXCESS 6FR 70CM: Type: IMPLANTABLE DEVICE | Status: FUNCTIONAL

## 2022-05-31 DEVICE — GWIRE NITINOL STRT TIP .035IN 150CM: Type: IMPLANTABLE DEVICE | Status: FUNCTIONAL

## 2022-05-31 DEVICE — GUIDEWIRE SENSOR ANG 150CM .038: Type: IMPLANTABLE DEVICE | Status: FUNCTIONAL

## 2022-05-31 DEVICE — GWIRE SENSOR DUAL-FLEX NITINOL0.038INX150CM: Type: IMPLANTABLE DEVICE | Status: FUNCTIONAL

## 2022-05-31 DEVICE — CATH URET STONE DISPLC DL 10FR: Type: IMPLANTABLE DEVICE | Status: FUNCTIONAL

## 2022-05-31 DEVICE — GUIDEWIRE .038IN X 3CM ANGLE X 150CM: Type: IMPLANTABLE DEVICE | Status: FUNCTIONAL

## 2022-05-31 RX ORDER — HYDROCHLOROTHIAZIDE 25 MG
25 TABLET ORAL DAILY
Refills: 0 | Status: DISCONTINUED | OUTPATIENT
Start: 2022-05-31 | End: 2022-06-03

## 2022-05-31 RX ORDER — SODIUM CHLORIDE 9 MG/ML
1000 INJECTION INTRAMUSCULAR; INTRAVENOUS; SUBCUTANEOUS ONCE
Refills: 0 | Status: COMPLETED | OUTPATIENT
Start: 2022-05-31 | End: 2022-05-31

## 2022-05-31 RX ORDER — ONDANSETRON 8 MG/1
4 TABLET, FILM COATED ORAL ONCE
Refills: 0 | Status: COMPLETED | OUTPATIENT
Start: 2022-05-31 | End: 2022-05-31

## 2022-05-31 RX ORDER — MORPHINE SULFATE 50 MG/1
6 CAPSULE, EXTENDED RELEASE ORAL ONCE
Refills: 0 | Status: DISCONTINUED | OUTPATIENT
Start: 2022-05-31 | End: 2022-05-31

## 2022-05-31 RX ORDER — ACETAMINOPHEN 500 MG
1000 TABLET ORAL ONCE
Refills: 0 | Status: COMPLETED | OUTPATIENT
Start: 2022-05-31 | End: 2022-05-31

## 2022-05-31 RX ORDER — KETOROLAC TROMETHAMINE 30 MG/ML
15 SYRINGE (ML) INJECTION ONCE
Refills: 0 | Status: DISCONTINUED | OUTPATIENT
Start: 2022-05-31 | End: 2022-05-31

## 2022-05-31 RX ORDER — CEFTRIAXONE 500 MG/1
1000 INJECTION, POWDER, FOR SOLUTION INTRAMUSCULAR; INTRAVENOUS ONCE
Refills: 0 | Status: COMPLETED | OUTPATIENT
Start: 2022-05-31 | End: 2022-05-31

## 2022-05-31 RX ORDER — LOSARTAN POTASSIUM 100 MG/1
100 TABLET, FILM COATED ORAL DAILY
Refills: 0 | Status: DISCONTINUED | OUTPATIENT
Start: 2022-05-31 | End: 2022-06-03

## 2022-05-31 RX ORDER — HYDROMORPHONE HYDROCHLORIDE 2 MG/ML
1 INJECTION INTRAMUSCULAR; INTRAVENOUS; SUBCUTANEOUS ONCE
Refills: 0 | Status: DISCONTINUED | OUTPATIENT
Start: 2022-05-31 | End: 2022-05-31

## 2022-05-31 RX ORDER — METOPROLOL TARTRATE 50 MG
100 TABLET ORAL DAILY
Refills: 0 | Status: DISCONTINUED | OUTPATIENT
Start: 2022-05-31 | End: 2022-06-03

## 2022-05-31 RX ORDER — ASPIRIN/CALCIUM CARB/MAGNESIUM 324 MG
81 TABLET ORAL DAILY
Refills: 0 | Status: DISCONTINUED | OUTPATIENT
Start: 2022-05-31 | End: 2022-06-03

## 2022-05-31 RX ORDER — SIMVASTATIN 20 MG/1
40 TABLET, FILM COATED ORAL AT BEDTIME
Refills: 0 | Status: DISCONTINUED | OUTPATIENT
Start: 2022-05-31 | End: 2022-06-03

## 2022-05-31 RX ORDER — MONTELUKAST 4 MG/1
10 TABLET, CHEWABLE ORAL DAILY
Refills: 0 | Status: DISCONTINUED | OUTPATIENT
Start: 2022-05-31 | End: 2022-06-03

## 2022-05-31 RX ADMIN — SODIUM CHLORIDE 1000 MILLILITER(S): 9 INJECTION INTRAMUSCULAR; INTRAVENOUS; SUBCUTANEOUS at 13:23

## 2022-05-31 RX ADMIN — Medication 400 MILLIGRAM(S): at 22:32

## 2022-05-31 RX ADMIN — CEFTRIAXONE 100 MILLIGRAM(S): 500 INJECTION, POWDER, FOR SOLUTION INTRAMUSCULAR; INTRAVENOUS at 12:02

## 2022-05-31 RX ADMIN — SODIUM CHLORIDE 1000 MILLILITER(S): 9 INJECTION INTRAMUSCULAR; INTRAVENOUS; SUBCUTANEOUS at 22:32

## 2022-05-31 RX ADMIN — HYDROMORPHONE HYDROCHLORIDE 1 MILLIGRAM(S): 2 INJECTION INTRAMUSCULAR; INTRAVENOUS; SUBCUTANEOUS at 09:50

## 2022-05-31 RX ADMIN — SODIUM CHLORIDE 1000 MILLILITER(S): 9 INJECTION INTRAMUSCULAR; INTRAVENOUS; SUBCUTANEOUS at 09:10

## 2022-05-31 RX ADMIN — SODIUM CHLORIDE 1000 MILLILITER(S): 9 INJECTION INTRAMUSCULAR; INTRAVENOUS; SUBCUTANEOUS at 08:10

## 2022-05-31 RX ADMIN — ONDANSETRON 4 MILLIGRAM(S): 8 TABLET, FILM COATED ORAL at 22:32

## 2022-05-31 RX ADMIN — MORPHINE SULFATE 6 MILLIGRAM(S): 50 CAPSULE, EXTENDED RELEASE ORAL at 08:21

## 2022-05-31 RX ADMIN — Medication 1000 MILLIGRAM(S): at 23:02

## 2022-05-31 RX ADMIN — Medication 15 MILLIGRAM(S): at 13:54

## 2022-05-31 RX ADMIN — MORPHINE SULFATE 6 MILLIGRAM(S): 50 CAPSULE, EXTENDED RELEASE ORAL at 08:06

## 2022-05-31 RX ADMIN — ONDANSETRON 4 MILLIGRAM(S): 8 TABLET, FILM COATED ORAL at 08:05

## 2022-05-31 RX ADMIN — HYDROMORPHONE HYDROCHLORIDE 1 MILLIGRAM(S): 2 INJECTION INTRAMUSCULAR; INTRAVENOUS; SUBCUTANEOUS at 09:35

## 2022-05-31 RX ADMIN — Medication 15 MILLIGRAM(S): at 13:24

## 2022-05-31 NOTE — ED PROVIDER NOTE - PHYSICAL EXAMINATION
General: well appearing, interactive, NAD  HEENT: pupils equal and reactive, normal external ears bilaterally   Cardiac: RRR, no MRG appreciated  Resp: lungs clear to auscultation bilaterally, symmetric chest wall rise  Abd: soft, +RUQ tenderness, -doty sign, nondistended,   : no CVA tenderness  Neuro: Moving all extremities  Skin:  normal color for race

## 2022-05-31 NOTE — ED PROVIDER NOTE - NS ED ROS FT
CONSTITUTIONAL: No fevers, no chills  Eyes: No vision changes  Cardiovascular: No Chest pain  Respiratory: No SOB  Gastrointestinal: +n/v, no c/d, +abd pain  Genitourinary: no dysuria, no hematuria  SKIN: no rashes.  MSK: no weakness, no myalgias, no arthralgias  NEURO: no headache, no weakness, no numbness  PSYCHIATRIC: no SI/HI

## 2022-05-31 NOTE — H&P ADULT - HISTORY OF PRESENT ILLNESS
Called to see patient 66 y/o well known to Dr. Parekh for previous management of epididymis, orchitis with scrotal abscess, s/p supra-pubic tube and  s/p orchiectomy.  Patient about 1 am last evening was awaken with mid, upper abdominal pain and radiating to the back, Pain was significant and unable to get any relieve and thus present to ER for evaluation.   States having some nausea no vomiting and denies having fever or chills with no previous event similar.

## 2022-05-31 NOTE — ED ADULT NURSE REASSESSMENT NOTE - NS ED NURSE REASSESS COMMENT FT1
Pt resting comfortably on stretcher.  No complaints of pain.  Respirations even and unlabored.  Awaiting bed placement.  Sheldon provided to pt.   PIV wnl; flushing without difficulty.  In NAD, will continue to monitor.
Pt resting comfortably on stretcher.  Pt endorsing improvement of pain following medication administration.  Respirations even and unlabored.  Awaiting CT scan.  PIV wnl; flushing without difficulty.  In NAD, will continue to monitor.
TA Watson made aware of VS, orders to follow

## 2022-05-31 NOTE — H&P ADULT - NSHPPHYSICALEXAM_GEN_ALL_CORE
PE:   awake and alert resting in bed , no acute distress  Abdomen:  obese, soft, healed surgical scars mild discomfort on general palpation abdomen  ( received IV pain medication earlier.)  Back: no flank/CVA pain or tenderness on palpation  : healed surgical scars , hx abscess ( scrotal ), and right orchiectomy .  voids with only complaint of burning ,  denies urgency, difficulty voiding , and or hematuria.

## 2022-05-31 NOTE — ED ADULT NURSE NOTE - OBJECTIVE STATEMENT
Pt arrives c/o RUQ abdominal pain since 1 am after eating a burger.   Tenderness on palpation.  Pt denies n/v/d/fever/chills.  PMH  colon resection.  Pt restless and moaning in stretcher.

## 2022-05-31 NOTE — CONSULT NOTE ADULT - SUBJECTIVE AND OBJECTIVE BOX
UROLOGY CONSULT:    Called to see patient 66 y/o well known to Dr. Parekh for previous management of epididymis, orchitis with scrotal abscess, s/p supra-pubic tube and  s/p orchectomy.  Patient about 1 am last evening was awaken with mid, upper abdominal pain and radiating to the back, Pain was significant and unable to get any relieve and thus present to ER for evaluation.   States having some nausea no vomiting and denies having fever or chills with no previous event similar.       Allergies: PCN    Past medical history:  COPD, mild   Epididymo-orchitis   HTN (hypertension)   Prostate CA s/p radiation 2013ical history:  UROLOGY CONSULT:    Called to see patient 68 y/o well known to Dr. Parekh for previous management of epididymis, orchitis with scrotal abscess, s/p supra-pubic tube and  s/p orchiectomy.  Patient about 1 am last evening was awaken with mid, upper abdominal pain and radiating to the back, Pain was significant and unable to get any relieve and thus present to ER for evaluation.   States having some nausea no vomiting and denies having fever or chills with no previous event similar.       Allergies: PCN    Past medical history:    COPD, mild   Epididymo-orchitis   HTN (hypertension)   Prostate CA s/p radiation 2013    Past Surgical History:    right orchiectomy  3/2022  I&D scrotal abscess3/2022  colon resection 2012  appendectomy 2000

## 2022-05-31 NOTE — ED PROVIDER NOTE - ATTENDING CONTRIBUTION TO CARE
67 yom w/PMHx epididymo-orchitis s/p orchectomy in 3/2022, prostate cancer, colon resection, htn, copd presents c/o abdominal pain.  Pt states his pain started this morning at 1am, in ruq a/w nausea. Started after eating. Denies any scrotal pain. Denies any fever, cp, sob, diarrhea.   AP - tender in RUQ, will check labs, pain control. eval for cholecystitis. reassess

## 2022-05-31 NOTE — ED ADULT NURSE NOTE - NSIMPLEMENTINTERV_GEN_ALL_ED
Implemented All Fall Risk Interventions:  [place] to call system. Call bell, personal items and telephone within reach. Instruct patient to call for assistance. Room bathroom lighting operational. Non-slip footwear when patient is off stretcher. Physically safe environment: no spills, clutter or unnecessary equipment. Stretcher in lowest position, wheels locked, appropriate side rails in place. Provide visual cue, wrist band, yellow gown, etc. Monitor gait and stability. Monitor for mental status changes and reorient to person, place, and time. Review medications for side effects contributing to fall risk. Reinforce activity limits and safety measures with patient and family.

## 2022-05-31 NOTE — H&P ADULT - ASSESSMENT
Patient is 68 y/o M presenting with right flank pain found to have Distal right ureteral calculus with secondary hydroureteronephrosis with a leukocytosis of 19, now febrile and tachycardiac.   Admit to urology   To the OR tonight with cysto and stent  pain control  abx  IV hydration   NPO  continue home medications  dispo pending

## 2022-05-31 NOTE — ED ADULT TRIAGE NOTE - CHIEF COMPLAINT QUOTE
BIBEMS reporting abdominal pain since 1 am this morning after eating a burger. Pt reporting mild nausea, denies vomiting/diarrhea. Denies any chest pain.

## 2022-05-31 NOTE — ED PROVIDER NOTE - OBJECTIVE STATEMENT
Pt is a 66 y/o M w/PMHx epididymo-orchitis s/p orchectomy, prostate cancer, resection, htn, copd presents c/o abdominal pain.  Pt states his pain started this morning at 1am, radiating to his back, associated with nausea.  Pt has not had this pain in the past, states he ate a hamburger last night prior to the onset of pain which has worsened to 10/10.  Pt denies fever, headache, chest pain, shortness of breath, c/d.

## 2022-06-01 LAB
ANION GAP SERPL CALC-SCNC: 9 MMOL/L — SIGNIFICANT CHANGE UP (ref 5–17)
BASOPHILS # BLD AUTO: 0.03 K/UL — SIGNIFICANT CHANGE UP (ref 0–0.2)
BASOPHILS NFR BLD AUTO: 0.2 % — SIGNIFICANT CHANGE UP (ref 0–2)
BUN SERPL-MCNC: 18.3 MG/DL — SIGNIFICANT CHANGE UP (ref 8–20)
CALCIUM SERPL-MCNC: 8.7 MG/DL — SIGNIFICANT CHANGE UP (ref 8.6–10.2)
CHLORIDE SERPL-SCNC: 104 MMOL/L — SIGNIFICANT CHANGE UP (ref 98–107)
CO2 SERPL-SCNC: 25 MMOL/L — SIGNIFICANT CHANGE UP (ref 22–29)
CREAT SERPL-MCNC: 0.83 MG/DL — SIGNIFICANT CHANGE UP (ref 0.5–1.3)
EGFR: 95 ML/MIN/1.73M2 — SIGNIFICANT CHANGE UP
EOSINOPHIL # BLD AUTO: 0 K/UL — SIGNIFICANT CHANGE UP (ref 0–0.5)
EOSINOPHIL NFR BLD AUTO: 0 % — SIGNIFICANT CHANGE UP (ref 0–6)
GLUCOSE SERPL-MCNC: 148 MG/DL — HIGH (ref 70–99)
HCT VFR BLD CALC: 35.9 % — LOW (ref 39–50)
HGB BLD-MCNC: 11.2 G/DL — LOW (ref 13–17)
IMM GRANULOCYTES NFR BLD AUTO: 0.3 % — SIGNIFICANT CHANGE UP (ref 0–1.5)
LYMPHOCYTES # BLD AUTO: 1.8 K/UL — SIGNIFICANT CHANGE UP (ref 1–3.3)
LYMPHOCYTES # BLD AUTO: 13 % — SIGNIFICANT CHANGE UP (ref 13–44)
MAGNESIUM SERPL-MCNC: 1.9 MG/DL — SIGNIFICANT CHANGE UP (ref 1.6–2.6)
MCHC RBC-ENTMCNC: 31.2 GM/DL — LOW (ref 32–36)
MCHC RBC-ENTMCNC: 31.3 PG — SIGNIFICANT CHANGE UP (ref 27–34)
MCV RBC AUTO: 100.3 FL — HIGH (ref 80–100)
MONOCYTES # BLD AUTO: 0.44 K/UL — SIGNIFICANT CHANGE UP (ref 0–0.9)
MONOCYTES NFR BLD AUTO: 3.2 % — SIGNIFICANT CHANGE UP (ref 2–14)
MRSA PCR RESULT.: DETECTED
NEUTROPHILS # BLD AUTO: 11.58 K/UL — HIGH (ref 1.8–7.4)
NEUTROPHILS NFR BLD AUTO: 83.3 % — HIGH (ref 43–77)
PHOSPHATE SERPL-MCNC: 3.4 MG/DL — SIGNIFICANT CHANGE UP (ref 2.4–4.7)
PLATELET # BLD AUTO: 329 K/UL — SIGNIFICANT CHANGE UP (ref 150–400)
POTASSIUM SERPL-MCNC: 4.3 MMOL/L — SIGNIFICANT CHANGE UP (ref 3.5–5.3)
POTASSIUM SERPL-SCNC: 4.3 MMOL/L — SIGNIFICANT CHANGE UP (ref 3.5–5.3)
RBC # BLD: 3.58 M/UL — LOW (ref 4.2–5.8)
RBC # FLD: 13.4 % — SIGNIFICANT CHANGE UP (ref 10.3–14.5)
S AUREUS DNA NOSE QL NAA+PROBE: DETECTED
SODIUM SERPL-SCNC: 138 MMOL/L — SIGNIFICANT CHANGE UP (ref 135–145)
WBC # BLD: 13.89 K/UL — HIGH (ref 3.8–10.5)
WBC # FLD AUTO: 13.89 K/UL — HIGH (ref 3.8–10.5)

## 2022-06-01 PROCEDURE — 52000 CYSTOURETHROSCOPY: CPT

## 2022-06-01 RX ORDER — MUPIROCIN 20 MG/G
1 OINTMENT TOPICAL
Refills: 0 | Status: DISCONTINUED | OUTPATIENT
Start: 2022-06-01 | End: 2022-06-03

## 2022-06-01 RX ORDER — ONDANSETRON 8 MG/1
4 TABLET, FILM COATED ORAL EVERY 6 HOURS
Refills: 0 | Status: DISCONTINUED | OUTPATIENT
Start: 2022-06-01 | End: 2022-06-03

## 2022-06-01 RX ORDER — FENTANYL CITRATE 50 UG/ML
25 INJECTION INTRAVENOUS
Refills: 0 | Status: DISCONTINUED | OUTPATIENT
Start: 2022-06-01 | End: 2022-06-01

## 2022-06-01 RX ORDER — CHLORHEXIDINE GLUCONATE 213 G/1000ML
1 SOLUTION TOPICAL
Refills: 0 | Status: DISCONTINUED | OUTPATIENT
Start: 2022-06-01 | End: 2022-06-03

## 2022-06-01 RX ORDER — CEFTRIAXONE 500 MG/1
1000 INJECTION, POWDER, FOR SOLUTION INTRAMUSCULAR; INTRAVENOUS EVERY 24 HOURS
Refills: 0 | Status: COMPLETED | OUTPATIENT
Start: 2022-06-01 | End: 2022-06-03

## 2022-06-01 RX ORDER — OXYCODONE HYDROCHLORIDE 5 MG/1
5 TABLET ORAL EVERY 4 HOURS
Refills: 0 | Status: DISCONTINUED | OUTPATIENT
Start: 2022-06-01 | End: 2022-06-03

## 2022-06-01 RX ORDER — MUPIROCIN 20 MG/G
1 OINTMENT TOPICAL
Refills: 0 | Status: DISCONTINUED | OUTPATIENT
Start: 2022-06-01 | End: 2022-06-01

## 2022-06-01 RX ORDER — SODIUM CHLORIDE 9 MG/ML
1000 INJECTION, SOLUTION INTRAVENOUS
Refills: 0 | Status: DISCONTINUED | OUTPATIENT
Start: 2022-06-01 | End: 2022-06-01

## 2022-06-01 RX ORDER — SODIUM CHLORIDE 9 MG/ML
1000 INJECTION, SOLUTION INTRAVENOUS
Refills: 0 | Status: DISCONTINUED | OUTPATIENT
Start: 2022-06-01 | End: 2022-06-03

## 2022-06-01 RX ORDER — HEPARIN SODIUM 5000 [USP'U]/ML
5000 INJECTION INTRAVENOUS; SUBCUTANEOUS EVERY 8 HOURS
Refills: 0 | Status: DISCONTINUED | OUTPATIENT
Start: 2022-06-01 | End: 2022-06-03

## 2022-06-01 RX ORDER — OXYCODONE HYDROCHLORIDE 5 MG/1
10 TABLET ORAL EVERY 4 HOURS
Refills: 0 | Status: DISCONTINUED | OUTPATIENT
Start: 2022-06-01 | End: 2022-06-03

## 2022-06-01 RX ADMIN — Medication 81 MILLIGRAM(S): at 10:13

## 2022-06-01 RX ADMIN — MONTELUKAST 10 MILLIGRAM(S): 4 TABLET, CHEWABLE ORAL at 10:14

## 2022-06-01 RX ADMIN — MUPIROCIN 1 APPLICATION(S): 20 OINTMENT TOPICAL at 21:49

## 2022-06-01 RX ADMIN — Medication 1 TABLET(S): at 10:14

## 2022-06-01 RX ADMIN — HEPARIN SODIUM 5000 UNIT(S): 5000 INJECTION INTRAVENOUS; SUBCUTANEOUS at 15:36

## 2022-06-01 RX ADMIN — OXYCODONE HYDROCHLORIDE 5 MILLIGRAM(S): 5 TABLET ORAL at 22:35

## 2022-06-01 RX ADMIN — HEPARIN SODIUM 5000 UNIT(S): 5000 INJECTION INTRAVENOUS; SUBCUTANEOUS at 21:49

## 2022-06-01 RX ADMIN — SODIUM CHLORIDE 100 MILLILITER(S): 9 INJECTION, SOLUTION INTRAVENOUS at 06:53

## 2022-06-01 RX ADMIN — Medication 100 MILLIGRAM(S): at 04:51

## 2022-06-01 RX ADMIN — CHLORHEXIDINE GLUCONATE 1 APPLICATION(S): 213 SOLUTION TOPICAL at 10:14

## 2022-06-01 RX ADMIN — Medication 25 MILLIGRAM(S): at 04:52

## 2022-06-01 RX ADMIN — LOSARTAN POTASSIUM 100 MILLIGRAM(S): 100 TABLET, FILM COATED ORAL at 04:51

## 2022-06-01 RX ADMIN — OXYCODONE HYDROCHLORIDE 5 MILLIGRAM(S): 5 TABLET ORAL at 17:13

## 2022-06-01 RX ADMIN — OXYCODONE HYDROCHLORIDE 5 MILLIGRAM(S): 5 TABLET ORAL at 23:30

## 2022-06-01 RX ADMIN — SODIUM CHLORIDE 100 MILLILITER(S): 9 INJECTION, SOLUTION INTRAVENOUS at 10:14

## 2022-06-01 RX ADMIN — OXYCODONE HYDROCHLORIDE 5 MILLIGRAM(S): 5 TABLET ORAL at 15:42

## 2022-06-01 RX ADMIN — CEFTRIAXONE 100 MILLIGRAM(S): 500 INJECTION, POWDER, FOR SOLUTION INTRAMUSCULAR; INTRAVENOUS at 03:25

## 2022-06-01 RX ADMIN — SODIUM CHLORIDE 75 MILLILITER(S): 9 INJECTION, SOLUTION INTRAVENOUS at 03:25

## 2022-06-01 NOTE — BRIEF OPERATIVE NOTE - OPERATION/FINDINGS
1) R moderate HN  2) 10F PCN placed on right via midpole calyx with US and Fluoro guidance, serous urine draining
cystoscopy with long urethra, high bladder neck  difficulty visualizing either UO  one area suspected for right UO with a difficult angle  failed attempt to pass any wire, angled or straight     wheeler catheter inserted into the bladder  procedure aborted after multiple attempts

## 2022-06-01 NOTE — PROGRESS NOTE ADULT - SUBJECTIVE AND OBJECTIVE BOX
UROLOGY F/U:    POD# 1 s/p cysto with failed attempt placement right ureteral stent.   POD#1 following failed OR stent placement- patient with IR placement right nephrostomy tube.    Patient seen and examined- awake and alert resting in bed with mild post operative discomfort.  Temp. max 101.3 9:29 pm, this am temp. down afebrile, VSS, /80, P 89, R 19  Tolerated surgery feeling some what better this am.     chest: good air exchange, denies SOB  Abdomen:  soft, less discomfort today   :  wheeler catheter in place 600cc yellow urine  Back: right nephrostomy in place and functioning with dark urine output (old blood)   , freely flowing. 275cc last shift/ 500cc since surgery.    Labs:  6/1/22    CBC: wbc 13.8/ 19.7         hg 11.2/ 11.7        hct  35.9/ 35.9    Lytes: 138/ 4.3/ 104/ 25  Bun: 18.3  creat: 0.83    Urine culture- 5/31/22  >100,000 gram negative rods  < 10,000 normal urogenital anup      Impression:  stable post-op Day 1, tolerated cysto with attempted stent and tolerated IR right nephrostomy tube placement.  Discuss with Surgeon present situation and continued care and management.  Plan:  discontinue wheeler catheter and TOV and continue to follow.

## 2022-06-02 LAB
-  AMIKACIN: SIGNIFICANT CHANGE UP
-  AMOXICILLIN/CLAVULANIC ACID: SIGNIFICANT CHANGE UP
-  AMPICILLIN/SULBACTAM: SIGNIFICANT CHANGE UP
-  AMPICILLIN: SIGNIFICANT CHANGE UP
-  AZTREONAM: SIGNIFICANT CHANGE UP
-  CEFAZOLIN: SIGNIFICANT CHANGE UP
-  CEFEPIME: SIGNIFICANT CHANGE UP
-  CEFOXITIN: SIGNIFICANT CHANGE UP
-  CEFTRIAXONE: SIGNIFICANT CHANGE UP
-  CIPROFLOXACIN: SIGNIFICANT CHANGE UP
-  ERTAPENEM: SIGNIFICANT CHANGE UP
-  GENTAMICIN: SIGNIFICANT CHANGE UP
-  LEVOFLOXACIN: SIGNIFICANT CHANGE UP
-  MEROPENEM: SIGNIFICANT CHANGE UP
-  NITROFURANTOIN: SIGNIFICANT CHANGE UP
-  PIPERACILLIN/TAZOBACTAM: SIGNIFICANT CHANGE UP
-  TOBRAMYCIN: SIGNIFICANT CHANGE UP
-  TRIMETHOPRIM/SULFAMETHOXAZOLE: SIGNIFICANT CHANGE UP
ANION GAP SERPL CALC-SCNC: 6 MMOL/L — SIGNIFICANT CHANGE UP (ref 5–17)
BUN SERPL-MCNC: 18.7 MG/DL — SIGNIFICANT CHANGE UP (ref 8–20)
CALCIUM SERPL-MCNC: 9.2 MG/DL — SIGNIFICANT CHANGE UP (ref 8.6–10.2)
CHLORIDE SERPL-SCNC: 102 MMOL/L — SIGNIFICANT CHANGE UP (ref 98–107)
CO2 SERPL-SCNC: 29 MMOL/L — SIGNIFICANT CHANGE UP (ref 22–29)
CREAT SERPL-MCNC: 0.93 MG/DL — SIGNIFICANT CHANGE UP (ref 0.5–1.3)
CULTURE RESULTS: SIGNIFICANT CHANGE UP
EGFR: 89 ML/MIN/1.73M2 — SIGNIFICANT CHANGE UP
GLUCOSE SERPL-MCNC: 140 MG/DL — HIGH (ref 70–99)
HCT VFR BLD CALC: 32.6 % — LOW (ref 39–50)
HGB BLD-MCNC: 10.4 G/DL — LOW (ref 13–17)
MAGNESIUM SERPL-MCNC: 2.1 MG/DL — SIGNIFICANT CHANGE UP (ref 1.6–2.6)
MCHC RBC-ENTMCNC: 31.9 GM/DL — LOW (ref 32–36)
MCHC RBC-ENTMCNC: 32.3 PG — SIGNIFICANT CHANGE UP (ref 27–34)
MCV RBC AUTO: 101.2 FL — HIGH (ref 80–100)
METHOD TYPE: SIGNIFICANT CHANGE UP
ORGANISM # SPEC MICROSCOPIC CNT: SIGNIFICANT CHANGE UP
ORGANISM # SPEC MICROSCOPIC CNT: SIGNIFICANT CHANGE UP
PHOSPHATE SERPL-MCNC: 3.2 MG/DL — SIGNIFICANT CHANGE UP (ref 2.4–4.7)
PLATELET # BLD AUTO: 353 K/UL — SIGNIFICANT CHANGE UP (ref 150–400)
POTASSIUM SERPL-MCNC: 5 MMOL/L — SIGNIFICANT CHANGE UP (ref 3.5–5.3)
POTASSIUM SERPL-SCNC: 5 MMOL/L — SIGNIFICANT CHANGE UP (ref 3.5–5.3)
RBC # BLD: 3.22 M/UL — LOW (ref 4.2–5.8)
RBC # FLD: 13.3 % — SIGNIFICANT CHANGE UP (ref 10.3–14.5)
SODIUM SERPL-SCNC: 137 MMOL/L — SIGNIFICANT CHANGE UP (ref 135–145)
SPECIMEN SOURCE: SIGNIFICANT CHANGE UP
WBC # BLD: 12.05 K/UL — HIGH (ref 3.8–10.5)
WBC # FLD AUTO: 12.05 K/UL — HIGH (ref 3.8–10.5)

## 2022-06-02 PROCEDURE — 99233 SBSQ HOSP IP/OBS HIGH 50: CPT

## 2022-06-02 RX ADMIN — CEFTRIAXONE 100 MILLIGRAM(S): 500 INJECTION, POWDER, FOR SOLUTION INTRAMUSCULAR; INTRAVENOUS at 05:04

## 2022-06-02 RX ADMIN — MONTELUKAST 10 MILLIGRAM(S): 4 TABLET, CHEWABLE ORAL at 12:18

## 2022-06-02 RX ADMIN — CHLORHEXIDINE GLUCONATE 1 APPLICATION(S): 213 SOLUTION TOPICAL at 05:14

## 2022-06-02 RX ADMIN — Medication 100 MILLIGRAM(S): at 05:05

## 2022-06-02 RX ADMIN — OXYCODONE HYDROCHLORIDE 5 MILLIGRAM(S): 5 TABLET ORAL at 10:47

## 2022-06-02 RX ADMIN — Medication 1 TABLET(S): at 12:17

## 2022-06-02 RX ADMIN — OXYCODONE HYDROCHLORIDE 5 MILLIGRAM(S): 5 TABLET ORAL at 09:47

## 2022-06-02 RX ADMIN — OXYCODONE HYDROCHLORIDE 5 MILLIGRAM(S): 5 TABLET ORAL at 17:23

## 2022-06-02 RX ADMIN — LOSARTAN POTASSIUM 100 MILLIGRAM(S): 100 TABLET, FILM COATED ORAL at 05:05

## 2022-06-02 RX ADMIN — MUPIROCIN 1 APPLICATION(S): 20 OINTMENT TOPICAL at 17:15

## 2022-06-02 RX ADMIN — SODIUM CHLORIDE 100 MILLILITER(S): 9 INJECTION, SOLUTION INTRAVENOUS at 23:20

## 2022-06-02 RX ADMIN — HEPARIN SODIUM 5000 UNIT(S): 5000 INJECTION INTRAVENOUS; SUBCUTANEOUS at 14:01

## 2022-06-02 RX ADMIN — OXYCODONE HYDROCHLORIDE 10 MILLIGRAM(S): 5 TABLET ORAL at 23:20

## 2022-06-02 RX ADMIN — HEPARIN SODIUM 5000 UNIT(S): 5000 INJECTION INTRAVENOUS; SUBCUTANEOUS at 21:01

## 2022-06-02 RX ADMIN — HEPARIN SODIUM 5000 UNIT(S): 5000 INJECTION INTRAVENOUS; SUBCUTANEOUS at 05:05

## 2022-06-02 RX ADMIN — Medication 25 MILLIGRAM(S): at 05:05

## 2022-06-02 RX ADMIN — Medication 81 MILLIGRAM(S): at 12:18

## 2022-06-02 RX ADMIN — SIMVASTATIN 40 MILLIGRAM(S): 20 TABLET, FILM COATED ORAL at 21:01

## 2022-06-02 RX ADMIN — MUPIROCIN 1 APPLICATION(S): 20 OINTMENT TOPICAL at 05:05

## 2022-06-02 NOTE — PROGRESS NOTE ADULT - SUBJECTIVE AND OBJECTIVE BOX
Subjective:67yMale with septic stone right ureter, s/p IR placement right NT 5/31.  pt feeling better today, appears better as well.  No c/o pain.  pt voiding w/o difficulty, R NT urine output yellow today. Pt tolerating diet.      Vital Signs Last 24 Hrs  T(C): 36.7 (02 Jun 2022 05:05), Max: 36.8 (01 Jun 2022 21:45)  T(F): 98 (02 Jun 2022 05:05), Max: 98.2 (01 Jun 2022 21:45)  HR: 82 (02 Jun 2022 05:05) (82 - 91)  BP: 130/83 (02 Jun 2022 05:05) (113/73 - 130/83)  BP(mean): --  RR: 18 (02 Jun 2022 05:05) (18 - 19)  SpO2: 99% (02 Jun 2022 05:05) (97% - 99%)  I&O's Detail    01 Jun 2022 07:01  -  02 Jun 2022 07:00  --------------------------------------------------------  IN:    IV PiggyBack: 50 mL    Lactated Ringers: 2400 mL    Oral Fluid: 1140 mL  Total IN: 3590 mL    OUT:    Indwelling Catheter - Urethral (mL): 300 mL    Nephrostomy Tube (mL): 1850 mL    Voided (mL): 805 mL  Total OUT: 2955 mL    Total NET: 635 mL          Labs:                        10.4   12.05 )-----------( 353      ( 02 Jun 2022 07:36 )             32.6     06-02    137  |  102  |  18.7  ----------------------------<  140<H>  5.0   |  29.0  |  0.93    Ca    9.2      02 Jun 2022 06:31  Phos  3.2     06-02  Mg     2.1     06-02            Culture - Urine (collected 31 May 2022 12:18)  Source: Clean Catch Clean Catch (Midstream)  Preliminary Report (01 Jun 2022 14:18):    >100,000 CFU/ml Proteus mirabilis    <10,000 CFU/ml Normal Urogenital anup present

## 2022-06-03 ENCOUNTER — TRANSCRIPTION ENCOUNTER (OUTPATIENT)
Age: 67
End: 2022-06-03

## 2022-06-03 VITALS
OXYGEN SATURATION: 98 % | RESPIRATION RATE: 18 BRPM | TEMPERATURE: 98 F | HEART RATE: 84 BPM | SYSTOLIC BLOOD PRESSURE: 143 MMHG | DIASTOLIC BLOOD PRESSURE: 83 MMHG

## 2022-06-03 PROCEDURE — 80053 COMPREHEN METABOLIC PANEL: CPT

## 2022-06-03 PROCEDURE — 96374 THER/PROPH/DIAG INJ IV PUSH: CPT

## 2022-06-03 PROCEDURE — 84484 ASSAY OF TROPONIN QUANT: CPT

## 2022-06-03 PROCEDURE — 96361 HYDRATE IV INFUSION ADD-ON: CPT

## 2022-06-03 PROCEDURE — 87640 STAPH A DNA AMP PROBE: CPT

## 2022-06-03 PROCEDURE — 93005 ELECTROCARDIOGRAM TRACING: CPT

## 2022-06-03 PROCEDURE — 81001 URINALYSIS AUTO W/SCOPE: CPT

## 2022-06-03 PROCEDURE — 87086 URINE CULTURE/COLONY COUNT: CPT

## 2022-06-03 PROCEDURE — 71046 X-RAY EXAM CHEST 2 VIEWS: CPT

## 2022-06-03 PROCEDURE — 83735 ASSAY OF MAGNESIUM: CPT

## 2022-06-03 PROCEDURE — U0005: CPT

## 2022-06-03 PROCEDURE — 87040 BLOOD CULTURE FOR BACTERIA: CPT

## 2022-06-03 PROCEDURE — 87641 MR-STAPH DNA AMP PROBE: CPT

## 2022-06-03 PROCEDURE — 84100 ASSAY OF PHOSPHORUS: CPT

## 2022-06-03 PROCEDURE — 85027 COMPLETE CBC AUTOMATED: CPT

## 2022-06-03 PROCEDURE — 99285 EMERGENCY DEPT VISIT HI MDM: CPT

## 2022-06-03 PROCEDURE — C1894: CPT

## 2022-06-03 PROCEDURE — C1729: CPT

## 2022-06-03 PROCEDURE — 87186 SC STD MICRODIL/AGAR DIL: CPT

## 2022-06-03 PROCEDURE — 74176 CT ABD & PELVIS W/O CONTRAST: CPT | Mod: MD

## 2022-06-03 PROCEDURE — 80048 BASIC METABOLIC PNL TOTAL CA: CPT

## 2022-06-03 PROCEDURE — 83690 ASSAY OF LIPASE: CPT

## 2022-06-03 PROCEDURE — 36415 COLL VENOUS BLD VENIPUNCTURE: CPT

## 2022-06-03 PROCEDURE — C9399: CPT

## 2022-06-03 PROCEDURE — 87077 CULTURE AEROBIC IDENTIFY: CPT

## 2022-06-03 PROCEDURE — 96375 TX/PRO/DX INJ NEW DRUG ADDON: CPT

## 2022-06-03 PROCEDURE — U0003: CPT

## 2022-06-03 PROCEDURE — 76942 ECHO GUIDE FOR BIOPSY: CPT

## 2022-06-03 PROCEDURE — 76705 ECHO EXAM OF ABDOMEN: CPT

## 2022-06-03 PROCEDURE — C1769: CPT

## 2022-06-03 PROCEDURE — 85025 COMPLETE CBC W/AUTO DIFF WBC: CPT

## 2022-06-03 RX ORDER — OXYCODONE HYDROCHLORIDE 5 MG/1
1 TABLET ORAL
Qty: 6 | Refills: 0
Start: 2022-06-03 | End: 2022-06-04

## 2022-06-03 RX ORDER — CEFPODOXIME PROXETIL 100 MG
1 TABLET ORAL
Qty: 20 | Refills: 0
Start: 2022-06-03 | End: 2022-06-12

## 2022-06-03 RX ADMIN — CEFTRIAXONE 100 MILLIGRAM(S): 500 INJECTION, POWDER, FOR SOLUTION INTRAMUSCULAR; INTRAVENOUS at 02:11

## 2022-06-03 RX ADMIN — Medication 81 MILLIGRAM(S): at 09:31

## 2022-06-03 RX ADMIN — OXYCODONE HYDROCHLORIDE 10 MILLIGRAM(S): 5 TABLET ORAL at 07:38

## 2022-06-03 RX ADMIN — MONTELUKAST 10 MILLIGRAM(S): 4 TABLET, CHEWABLE ORAL at 09:31

## 2022-06-03 RX ADMIN — Medication 100 MILLIGRAM(S): at 05:50

## 2022-06-03 RX ADMIN — CHLORHEXIDINE GLUCONATE 1 APPLICATION(S): 213 SOLUTION TOPICAL at 05:57

## 2022-06-03 RX ADMIN — LOSARTAN POTASSIUM 100 MILLIGRAM(S): 100 TABLET, FILM COATED ORAL at 05:50

## 2022-06-03 RX ADMIN — HEPARIN SODIUM 5000 UNIT(S): 5000 INJECTION INTRAVENOUS; SUBCUTANEOUS at 05:50

## 2022-06-03 RX ADMIN — Medication 1 TABLET(S): at 09:31

## 2022-06-03 RX ADMIN — OXYCODONE HYDROCHLORIDE 10 MILLIGRAM(S): 5 TABLET ORAL at 00:20

## 2022-06-03 RX ADMIN — OXYCODONE HYDROCHLORIDE 10 MILLIGRAM(S): 5 TABLET ORAL at 06:38

## 2022-06-03 RX ADMIN — MUPIROCIN 1 APPLICATION(S): 20 OINTMENT TOPICAL at 05:50

## 2022-06-03 RX ADMIN — Medication 25 MILLIGRAM(S): at 05:50

## 2022-06-03 NOTE — DISCHARGE NOTE NURSING/CASE MANAGEMENT/SOCIAL WORK - PATIENT PORTAL LINK FT
You can access the FollowMyHealth Patient Portal offered by [facility] by registering at the following website: [email or web address]. By joining [employer]’s FollowMyHealth portal, you will also be able to view your health information using other applications (apps) compatible with our system.

## 2022-06-03 NOTE — DISCHARGE NOTE PROVIDER - HOSPITAL COURSE
68 yo male presented with right flank and abdominal pain.  pt had a distal right ureteral stone.  pt became febrile and was taken to the OR urgently for cystoscopy and right ureteral stent placement.  During the cystoscopy, the right ureteral orifice was unable to be identified and the procedure was aborted.  IR was consulted for urgent right nephrostomy tube insertion which was done.  Pt was treated with IVabx, urine cultures grew proteus, blood cultures were negative.  Pt passed his voiding trial. Pt remained afebrile, leukocytosis improved.  The right nephrostomy tube is draining well, now clear, yellow urine. Pt is stable and ready for d/c to home on PO abx. Case  management was consulted for home care of the nephrostomy tube. pt will be scheduled as an OP for stone management.

## 2022-06-03 NOTE — PROGRESS NOTE ADULT - NS ATTEND AMEND GEN_ALL_CORE FT
agree with assessment  discussed plan with patient and family  will remove wheeler today and monitor labs
discussed plan with patient  will plan for ureteroscopy and possible antegrade approach
discussed with patient and family  improving and feeling well  pending final blood culture before possible discharge

## 2022-06-03 NOTE — DISCHARGE NOTE PROVIDER - NSDCCPTREATMENT_GEN_ALL_CORE_FT
PRINCIPAL PROCEDURE  Procedure: Insertion, nephrostomy tube  Findings and Treatment: right side for drainage

## 2022-06-03 NOTE — PROGRESS NOTE ADULT - SUBJECTIVE AND OBJECTIVE BOX
Subjective:67yMale with septic stone right ureter, s/p IR placement right nephrostomy tube 5/31.  pt feeling better today, remains afebrile, voiding well.  R NT clear, yellow urine, tolerating diet and ambulating.        Vital Signs Last 24 Hrs  T(C): 36.8 (03 Jun 2022 05:58), Max: 36.9 (02 Jun 2022 16:38)  T(F): 98.2 (03 Jun 2022 05:58), Max: 98.4 (02 Jun 2022 16:38)  HR: 99 (03 Jun 2022 05:58) (82 - 99)  BP: 120/85 (03 Jun 2022 05:58) (120/85 - 133/88)  BP(mean): --  RR: 17 (03 Jun 2022 05:58) (17 - 18)  SpO2: 99% (03 Jun 2022 05:58) (95% - 99%)  I&O's Detail    02 Jun 2022 07:01  -  03 Jun 2022 07:00  --------------------------------------------------------  IN:    Lactated Ringers: 900 mL    Oral Fluid: 480 mL  Total IN: 1380 mL    OUT:    Nephrostomy Tube (mL): 1785 mL    Voided (mL): 1425 mL  Total OUT: 3210 mL    Total NET: -1830 mL          Labs:                        10.4   12.05 )-----------( 353      ( 02 Jun 2022 07:36 )             32.6     06-02    137  |  102  |  18.7  ----------------------------<  140<H>  5.0   |  29.0  |  0.93    Ca    9.2      02 Jun 2022 06:31  Phos  3.2     06-02  Mg     2.1     06-02            Culture - Blood (collected 01 Jun 2022 07:17)  Source: .Blood Blood  Preliminary Report (03 Jun 2022 09:00):    No growth at 48 hours    Culture - Urine (collected 31 May 2022 12:18)  Source: Clean Catch Clean Catch (Midstream)  Final Report (02 Jun 2022 11:41):    >100,000 CFU/ml Proteus mirabilis    <10,000 CFU/ml Normal Urogenital anup present  Organism: Proteus mirabilis (02 Jun 2022 11:41)  Organism: Proteus mirabilis (02 Jun 2022 11:41)     Subjective: 67y Male with septic stone right ureter, s/p IR placement right nephrostomy tube 5/31.  pt feeling better today, remains afebrile, voiding well.  R NT clear, yellow urine, tolerating diet and ambulating.        Vital Signs Last 24 Hrs  T(C): 36.8 (03 Jun 2022 05:58), Max: 36.9 (02 Jun 2022 16:38)  T(F): 98.2 (03 Jun 2022 05:58), Max: 98.4 (02 Jun 2022 16:38)  HR: 99 (03 Jun 2022 05:58) (82 - 99)  BP: 120/85 (03 Jun 2022 05:58) (120/85 - 133/88)  BP(mean): --  RR: 17 (03 Jun 2022 05:58) (17 - 18)  SpO2: 99% (03 Jun 2022 05:58) (95% - 99%)  I&O's Detail    02 Jun 2022 07:01  -  03 Jun 2022 07:00  --------------------------------------------------------  IN:    Lactated Ringers: 900 mL    Oral Fluid: 480 mL  Total IN: 1380 mL    OUT:    Nephrostomy Tube (mL): 1785 mL    Voided (mL): 1425 mL  Total OUT: 3210 mL    Total NET: -1830 mL          Labs:                        10.4   12.05 )-----------( 353      ( 02 Jun 2022 07:36 )             32.6     06-02    137  |  102  |  18.7  ----------------------------<  140<H>  5.0   |  29.0  |  0.93    Ca    9.2      02 Jun 2022 06:31  Phos  3.2     06-02  Mg     2.1     06-02            Culture - Blood (collected 01 Jun 2022 07:17)  Source: .Blood Blood  Preliminary Report (03 Jun 2022 09:00):    No growth at 48 hours    Culture - Urine (collected 31 May 2022 12:18)  Source: Clean Catch Clean Catch (Midstream)  Final Report (02 Jun 2022 11:41):    >100,000 CFU/ml Proteus mirabilis    <10,000 CFU/ml Normal Urogenital anup present  Organism: Proteus mirabilis (02 Jun 2022 11:41)  Organism: Proteus mirabilis (02 Jun 2022 11:41)

## 2022-06-03 NOTE — DISCHARGE NOTE NURSING/CASE MANAGEMENT/SOCIAL WORK - NSDCPEFALRISK_GEN_ALL_CORE
For information on Fall & Injury Prevention, visit: [email or web address] OR  [email or web address] OR  https://www.cdc.gov/steadi/patient.html

## 2022-06-03 NOTE — DISCHARGE NOTE PROVIDER - NSDCFUADDINST_GEN_ALL_CORE_FT
empty the nephrostomy tube bag as needed  keep area clean and dry  no heavy lifting or straining empty the nephrostomy tube bag as needed  keep area clean and dry  no heavy lifting or straining  Dr. Parekh's office will call for stone management planning

## 2022-06-03 NOTE — DISCHARGE NOTE PROVIDER - NSDCCPCAREPLAN_GEN_ALL_CORE_FT
Detail Level: Generalized Detail Level: Detailed PRINCIPAL DISCHARGE DIAGNOSIS  Diagnosis: Kidney stone on right side  Assessment and Plan of Treatment: Septic right ureteral stone  Right nephrostomy tube placed, empty bag as needed  keep right nephrostomy tube site clean and dry  call the office if you develop of fever, nausea or vomiting  take all antibiotics as prescribed  Dr. Parekh's office will call you to set up a plan to address the stone      SECONDARY DISCHARGE DIAGNOSES  Diagnosis: Complicated UTI (urinary tract infection)  Assessment and Plan of Treatment: IV abx given for UTI and infected stone

## 2022-06-03 NOTE — PROGRESS NOTE ADULT - ASSESSMENT
66 yo male s/p right NT for septic stone, MRSA in nares, leukocytosis improving  - pt afebrile  - urine cx + proteus  - f/u urine cx sensitivities  - f/u blood cx's  - cont rocephin  - mupirocin to nares
68 yo male with septic stone right ureter  - f/u blood cx's  - if negative, will d/c home on PO abx  - case management for NT care  - will plan for stone management as OP once infected cleared

## 2022-06-03 NOTE — DISCHARGE NOTE PROVIDER - NSDCFUSCHEDAPPT_GEN_ALL_CORE_FT
Damián Parekh  [facility] Physician [facility]  UROLOGY [address]  Scheduled Appointment: 06/28/2022

## 2022-06-03 NOTE — DISCHARGE NOTE PROVIDER - NSDCHHNEEDSERVICEOTHER_GEN_ALL_CORE_FT
right nephrostomy tube care, flush gently with 10ml NS every other day, dressing changes when tube flushed

## 2022-06-03 NOTE — DISCHARGE NOTE PROVIDER - NSDCMRMEDTOKEN_GEN_ALL_CORE_FT
acetaminophen 325 mg oral tablet: 2 tab(s) orally every 6 hours, As needed, Temp greater or equal to 38C (100.4F), Mild Pain (1 - 3)  aspirin 81 mg oral delayed release tablet: 1 tab(s) orally once a day  cefpodoxime 200 mg oral tablet: 1 tab(s) orally every 12 hours   hydroCHLOROthiazide 25 mg oral tablet: 1 tab(s) orally once a day  losartan 100 mg oral tablet: 1 tab(s) orally once a day  Metoprolol Tartrate 100 mg oral tablet: 1 tab(s) orally once a day  montelukast 10 mg oral tablet: 1 tab(s) orally once a day  Multiple Vitamins oral tablet: 1 tab(s) orally once a day  oxyCODONE 5 mg oral tablet: 1 tab(s) orally every 4 hours, As needed, Moderate Pain (4 - 6) MDD:6  simvastatin 40 mg oral tablet: 1 tab(s) orally once a day (at bedtime)  tamsulosin 0.4 mg oral capsule: 1 cap(s) orally once a day

## 2022-06-03 NOTE — DISCHARGE NOTE PROVIDER - CARE PROVIDER_API CALL
Damián Parekh)  Urology  [address]  [place] [address]  Phone: [phone]  Fax: [phone]  Follow Up Time: 1 week

## 2022-06-06 LAB
CULTURE RESULTS: SIGNIFICANT CHANGE UP
SPECIMEN SOURCE: SIGNIFICANT CHANGE UP

## 2022-06-06 NOTE — CDI QUERY NOTE - NSCDIOTHERTXTBX2_GEN_ALL_CORE_FT
Can you please clarify if calculated BMI of 45.2 supports a clinically significant diagnosis?  A.	Morbid obesity with BMI of 45.2  B.	Other, please specify  C.	Not clinically significant      Supporting Documentation:      ED ADULT Triage Note [Charted Location: [facility] ED] [Authored: 31-May-2022 07:14]  Body Measurements:  • 	 used  • Dosing Weight (KILOGRAMS)	127 kg  • Dosing Weight  (POUNDS)	279.9 Pound(s)  • Height (FEET)	5 Feet  • Height (INCHES)	6 Inch(s)  • Height (CENTIMETERS)	167.64 Centimeter(s)  • BSA (m2)	2.31 Meter Squared  • BMI (kG/m2)	45.2

## 2022-06-06 NOTE — CDI QUERY NOTE - NSCDIOTHERTXTBX_GEN_ALL_CORE_HH
ED ADULT Flow Sheet [Charted Location: [facility] ED] [Authored: 31-May-2022 07:14]  Heart Rate  Heart Rate Heart Rate (beats/min): 126 /min      ED ADULT Flow Sheet [Charted Location: [facility] 1605 10] [Authored: 31-May-2022 21:29]  Temperature  Temp (F): 101.3 Degrees F  Temp (C) Temp (C): 38.5 Degrees C    Heart Rate  Heart Rate Heart Rate (beats/min): 130 /min      PAC[facility] - Adult 6.0 [facility] [Authored: 01-Jun-2022 00:12]  Heart Rate  Heart Rate Heart Rate (beats/min): 121 /min    Respiratory/Pulse Oximetry/Oxygen Therapy  Respiration Rate (breaths/min) Respiration Rate (breaths/min): 30 /min  SpO2 (%) SpO2 (%): 96 %  O2 Delivery/Oxygen Delivery Method Patient On (Oxygen Delivery Method): nasal cannula  Oxygen Therapy Flow (L/min) Oxygen Flow (L/min): 4 L/min        WBC:  WBC Count: 12.05 K/uL (06.02.22 @ 07:36)   WBC Count: 13.89 K/uL (06.01.22 @ 07:17)   WBC Count: 19.77 K/uL (05.31.22 @ 14:50)   WBC Count: 16.91 K/uL (05.31.22 @ 08:10)      cefTRIAXone   IVPB 1000 milliGRAM(s) IV Intermittent every 24 hours, given 5/31-6/3/22        H&P Adult [Charted Location: [facility] 1605 10] [Authored: 31-May-2022 22:24]  Reason for Admission: septic stone  Assessment:  • Assessment	  Patient is 68 y/o M presenting with right flank pain found to have Distal right ureteral calculus with secondary hydroureteronephrosis with a leukocytosis of 19, now febrile and tachycardiac.      Chart Note-Event Note NP [Charted Location: [facility] 2600 05] [Authored: 01-Jun-2022 01:17]  • Note Type	Event Note      Patient became febrile and tachycardiac was taken to the operating room with Dr. Parekh for cysto with stent placement. Dr. Parekh unable to place the stent. We then go in touch with Dr. Lantigua from IR and explained the current situation. Patient at this time (post -attempted stent placement)  is stable, remains tachycardiac to 120, afebrile 98, and normotensive. Patient will be going for IR placement of percutaneous nephrostomy tube this morning.          Progress Note Adult-Urology Physician Assistant/Attending [Charted Location: [facility] 4TWR 4205 01] [Authored: 02-Jun-2022 10:52]  • Subjective and Objective:   Subjective:67yMale with septic stone right ureter, s/p IR placement right NT 5/31.  pt feeling better today, appears better as well.  No c/o pain.  pt voiding w/o difficulty, R NT urine output yellow today. Pt tolerating diet.    Patient was noted to have septic stone, was tachycardic with HR of  126, febrile with a temp of 101.3, WBC count of 16.91. Can you please clarify if there is a clinically significant diagnosis associated with the listed findings and criteria/  A.	Sepsis present on admission  B.	Other, please specify  C.	Not clinically significant

## 2022-06-06 NOTE — CHART NOTE - NSCHARTNOTEFT_GEN_A_CORE
Please note upon review of chart the following diagnoses exists for this patient:    1.	Sepsis present on admission  2.	Morbid obesity with BMI of 45.2        ED ADULT Flow Sheet [Charted Location: [facility] ED] [Authored: 31-May-2022 07:14]  Heart Rate  Heart Rate Heart Rate (beats/min): 126 /min      ED ADULT Flow Sheet [Charted Location: [facility] 1605 10] [Authored: 31-May-2022 21:29]  Temperature  Temp (F): 101.3 Degrees F  Temp (C) Temp (C): 38.5 Degrees C    Heart Rate  Heart Rate Heart Rate (beats/min): 130 /min      PACU ASU - Adult 6.0 [Charted Location: [facility] PACU 2600 05] [Authored: 01-Jun-2022 00:12]  Heart Rate  Heart Rate Heart Rate (beats/min): 121 /min    Respiratory/Pulse Oximetry/Oxygen Therapy  Respiration Rate (breaths/min) Respiration Rate (breaths/min): 30 /min  SpO2 (%) SpO2 (%): 96 %  O2 Delivery/Oxygen Delivery Method Patient On (Oxygen Delivery Method): nasal cannula  Oxygen Therapy Flow (L/min) Oxygen Flow (L/min): 4 L/min        WBC:  WBC Count: 12.05 K/uL (06.02.22 @ 07:36)   WBC Count: 13.89 K/uL (06.01.22 @ 07:17)   WBC Count: 19.77 K/uL (05.31.22 @ 14:50)   WBC Count: 16.91 K/uL (05.31.22 @ 08:10)      cefTRIAXone   IVPB 1000 milliGRAM(s) IV Intermittent every 24 hours, given 5/31-6/3/22        H&P Adult [Charted Location: [facility] 1605 10] [Authored: 31-May-2022 22:24]  Reason for Admission: septic stone  Assessment:  • Assessment	  Patient is 68 y/o M presenting with right flank pain found to have Distal right ureteral calculus with secondary hydroureteronephrosis with a leukocytosis of 19, now febrile and tachycardiac.      Chart Note-Event Note NP [Charted Location: [facility] PACU 2600 05] [Authored: 01-Jun-2022 01:17]  • Note Type	Event Note      Patient became febrile and tachycardiac was taken to the operating room with Dr. Parekh for cysto with stent placement. Dr. Parekh unable to place the stent. We then go in touch with Dr. Lantigua from IR and explained the current situation. Patient at this time (post -attempted stent placement)  is stable, remains tachycardiac to 120, afebrile 98, and normotensive. Patient will be going for IR placement of percutaneous nephrostomy tube this morning.          Progress Note Adult-Urology Physician Assistant/Attending [Charted Location: [facility] 4TWR 4205 01] [Authored: 02-Jun-2022 10:52]  • Subjective and Objective:   Subjective:67yMale with septic stone right ureter, s/p IR placement right NT 5/31.  pt feeling better today, appears better as well.  No c/o pain.  pt voiding w/o difficulty, R NT urine output yellow today. Pt tolerating diet.        Supporting Documentation:  ED ADULT Triage Note [Charted Location: [facility] ED] [Authored: 31-May-2022 07:14]  Body Measurements:  • 	 used  • Dosing Weight (KILOGRAMS)	127 kg  • Dosing Weight  (POUNDS)	279.9 Pound(s)  • Height (FEET)	5 Feet  • Height (INCHES)	6 Inch(s)  • Height (CENTIMETERS)	167.64 Centimeter(s)  • BSA (m2)	2.31 Meter Squared  • BMI (kG/m2)	45.2.
Post-op Check    Subjective:  Pt offers no acute complaints at this time. Pain well controlled on current regiment. Denies chest pain, SOB, palpitations. Patient with right perc nephro tube with blood output, wheeler catheter in place draining blood tinged urine.     STATUS POST:  cysto with failed right stent placement then subsequent IR placement of R percutaneous nephrostomy tube    POST OPERATIVE DAY #: 0    MEDICATIONS  (STANDING):  aspirin enteric coated 81 milliGRAM(s) Oral daily  cefTRIAXone   IVPB 1000 milliGRAM(s) IV Intermittent every 24 hours  hydrochlorothiazide 25 milliGRAM(s) Oral daily  lactated ringers. 1000 milliLiter(s) (75 mL/Hr) IV Continuous <Continuous>  losartan 100 milliGRAM(s) Oral daily  metoprolol tartrate 100 milliGRAM(s) Oral daily  montelukast 10 milliGRAM(s) Oral daily  multivitamin 1 Tablet(s) Oral daily  simvastatin 40 milliGRAM(s) Oral at bedtime    MEDICATIONS  (PRN):  fentaNYL    Injectable 25 MICROGram(s) IV Push every 5 minutes PRN Moderate Pain (4 - 6)  ondansetron Injectable 4 milliGRAM(s) IV Push every 6 hours PRN Nausea and/or Vomiting  oxyCODONE    IR 5 milliGRAM(s) Oral every 4 hours PRN Moderate Pain (4 - 6)  oxyCODONE    IR 10 milliGRAM(s) Oral every 4 hours PRN Severe Pain (7 - 10)      Vital Signs Last 24 Hrs  T(C): 37 (01 Jun 2022 01:50), Max: 38.5 (31 May 2022 21:29)  T(F): 98.6 (01 Jun 2022 01:50), Max: 101.3 (31 May 2022 21:29)  HR: 115 (01 Jun 2022 01:50) (114 - 130)  BP: 137/84 (01 Jun 2022 01:50) (127/82 - 164/86)  BP(mean): 93 (01 Jun 2022 01:00) (88 - 93)  RR: 29 (01 Jun 2022 01:50) (18 - 30)  SpO2: 98% (01 Jun 2022 01:50) (94% - 98%)    Physical Exam:    Constitutional: NAD  HEENT: PERRL, EOMI  Neck: No JVD, FROM without pain  Respiratory: no accessory muscle use, respirations non-labored  GI: abdomen soft, non-tender, non-distended   : right percutaneous tube well seated draining hematuria, wheeler with blood thin tinged urine  Neurological: A&O x 3; without gross deficit    A:     P:  Continue current care  monitor nephro tube output  continue IV abx  monitor wheeler output  continue home meds  trend labs  Pain control  OOB as tolerated  Encourage IS  DVT ppx SCDS for now
Re-evaluated patient at bedside in pacu. Tachycardia resolving now low 100;s, normotensive, afebrile. Wheeler catheter in place draining blood tinge urine, does have urine leaking around the wheeler, I spoke with Dr. Parekh and he is okay with that for now, right percutaneous nephro tube well seated with dark reddish brown urine, hematuria is slowly resolving. Dr. Parekh updated on patients condition , will continue to follow
Patient became febrile and tachycardiac was taken to the operating room with Dr. Parekh for cysto with stent placement. Dr. Parekh unable to place the stent. We then go in touch with Dr. Lantigua from IR and explained the current situation. Patient at this time (post -attempted stent placement)  is stable, remains tachycardiac to 120, afebrile 98, and normotensive. Patient will be going for IR placement of percutaneous nephrostomy tube this morning.

## 2022-06-09 ENCOUNTER — NON-APPOINTMENT (OUTPATIENT)
Age: 67
End: 2022-06-09

## 2022-06-11 ENCOUNTER — OUTPATIENT (OUTPATIENT)
Dept: OUTPATIENT SERVICES | Facility: HOSPITAL | Age: 67
LOS: 1 days | End: 2022-06-11
Payer: MEDICARE

## 2022-06-11 VITALS
OXYGEN SATURATION: 97 % | HEART RATE: 97 BPM | WEIGHT: 257.94 LBS | RESPIRATION RATE: 18 BRPM | TEMPERATURE: 98 F | SYSTOLIC BLOOD PRESSURE: 119 MMHG | HEIGHT: 69 IN | DIASTOLIC BLOOD PRESSURE: 83 MMHG

## 2022-06-11 DIAGNOSIS — Z01.818 ENCOUNTER FOR OTHER PREPROCEDURAL EXAMINATION: ICD-10-CM

## 2022-06-11 DIAGNOSIS — Z90.79 ACQUIRED ABSENCE OF OTHER GENITAL ORGAN(S): Chronic | ICD-10-CM

## 2022-06-11 DIAGNOSIS — Z90.49 ACQUIRED ABSENCE OF OTHER SPECIFIED PARTS OF DIGESTIVE TRACT: Chronic | ICD-10-CM

## 2022-06-11 DIAGNOSIS — I10 ESSENTIAL (PRIMARY) HYPERTENSION: ICD-10-CM

## 2022-06-11 DIAGNOSIS — N20.0 CALCULUS OF KIDNEY: ICD-10-CM

## 2022-06-11 DIAGNOSIS — Z29.9 ENCOUNTER FOR PROPHYLACTIC MEASURES, UNSPECIFIED: ICD-10-CM

## 2022-06-11 LAB
A1C WITH ESTIMATED AVERAGE GLUCOSE RESULT: 6.1 % — HIGH (ref 4–5.6)
ANION GAP SERPL CALC-SCNC: 13 MMOL/L — SIGNIFICANT CHANGE UP (ref 5–17)
ANISOCYTOSIS BLD QL: SLIGHT — SIGNIFICANT CHANGE UP
APPEARANCE UR: CLEAR — SIGNIFICANT CHANGE UP
APTT BLD: 27.1 SEC — LOW (ref 27.5–35.5)
BASOPHILS # BLD AUTO: 0.13 K/UL — SIGNIFICANT CHANGE UP (ref 0–0.2)
BASOPHILS NFR BLD AUTO: 0.9 % — SIGNIFICANT CHANGE UP (ref 0–2)
BILIRUB UR-MCNC: NEGATIVE — SIGNIFICANT CHANGE UP
BUN SERPL-MCNC: 15.4 MG/DL — SIGNIFICANT CHANGE UP (ref 8–20)
CALCIUM SERPL-MCNC: 10.1 MG/DL — SIGNIFICANT CHANGE UP (ref 8.6–10.2)
CHLORIDE SERPL-SCNC: 101 MMOL/L — SIGNIFICANT CHANGE UP (ref 98–107)
CO2 SERPL-SCNC: 26 MMOL/L — SIGNIFICANT CHANGE UP (ref 22–29)
COLOR SPEC: YELLOW — SIGNIFICANT CHANGE UP
CREAT SERPL-MCNC: 0.89 MG/DL — SIGNIFICANT CHANGE UP (ref 0.5–1.3)
DIFF PNL FLD: ABNORMAL
EGFR: 93 ML/MIN/1.73M2 — SIGNIFICANT CHANGE UP
EOSINOPHIL # BLD AUTO: 0 K/UL — SIGNIFICANT CHANGE UP (ref 0–0.5)
EOSINOPHIL NFR BLD AUTO: 0 % — SIGNIFICANT CHANGE UP (ref 0–6)
EPI CELLS # UR: SIGNIFICANT CHANGE UP
ESTIMATED AVERAGE GLUCOSE: 128 MG/DL — HIGH (ref 68–114)
GLUCOSE SERPL-MCNC: 111 MG/DL — HIGH (ref 70–99)
GLUCOSE UR QL: NEGATIVE MG/DL — SIGNIFICANT CHANGE UP
HCT VFR BLD CALC: 40.1 % — SIGNIFICANT CHANGE UP (ref 39–50)
HGB BLD-MCNC: 12.4 G/DL — LOW (ref 13–17)
INR BLD: 0.92 RATIO — SIGNIFICANT CHANGE UP (ref 0.88–1.16)
KETONES UR-MCNC: NEGATIVE — SIGNIFICANT CHANGE UP
LEUKOCYTE ESTERASE UR-ACNC: ABNORMAL
LYMPHOCYTES # BLD AUTO: 37.7 % — SIGNIFICANT CHANGE UP (ref 13–44)
LYMPHOCYTES # BLD AUTO: 5.25 K/UL — HIGH (ref 1–3.3)
MACROCYTES BLD QL: SLIGHT — SIGNIFICANT CHANGE UP
MANUAL SMEAR VERIFICATION: SIGNIFICANT CHANGE UP
MCHC RBC-ENTMCNC: 30.9 GM/DL — LOW (ref 32–36)
MCHC RBC-ENTMCNC: 31.3 PG — SIGNIFICANT CHANGE UP (ref 27–34)
MCV RBC AUTO: 101.3 FL — HIGH (ref 80–100)
METAMYELOCYTES # FLD: 0.9 % — HIGH (ref 0–0)
MONOCYTES # BLD AUTO: 0.36 K/UL — SIGNIFICANT CHANGE UP (ref 0–0.9)
MONOCYTES NFR BLD AUTO: 2.6 % — SIGNIFICANT CHANGE UP (ref 2–14)
NEUTROPHILS # BLD AUTO: 8.06 K/UL — HIGH (ref 1.8–7.4)
NEUTROPHILS NFR BLD AUTO: 57.9 % — SIGNIFICANT CHANGE UP (ref 43–77)
NITRITE UR-MCNC: NEGATIVE — SIGNIFICANT CHANGE UP
PH UR: 7 — SIGNIFICANT CHANGE UP (ref 5–8)
PLAT MORPH BLD: NORMAL — SIGNIFICANT CHANGE UP
PLATELET # BLD AUTO: 513 K/UL — HIGH (ref 150–400)
POLYCHROMASIA BLD QL SMEAR: SIGNIFICANT CHANGE UP
POTASSIUM SERPL-MCNC: 5 MMOL/L — SIGNIFICANT CHANGE UP (ref 3.5–5.3)
POTASSIUM SERPL-SCNC: 5 MMOL/L — SIGNIFICANT CHANGE UP (ref 3.5–5.3)
PROT UR-MCNC: NEGATIVE — SIGNIFICANT CHANGE UP
PROTHROM AB SERPL-ACNC: 10.7 SEC — SIGNIFICANT CHANGE UP (ref 10.5–13.4)
RBC # BLD: 3.96 M/UL — LOW (ref 4.2–5.8)
RBC # FLD: 13.6 % — SIGNIFICANT CHANGE UP (ref 10.3–14.5)
RBC BLD AUTO: SIGNIFICANT CHANGE UP
RBC CASTS # UR COMP ASSIST: ABNORMAL /HPF (ref 0–4)
SODIUM SERPL-SCNC: 140 MMOL/L — SIGNIFICANT CHANGE UP (ref 135–145)
SP GR SPEC: 1.01 — SIGNIFICANT CHANGE UP (ref 1.01–1.02)
UROBILINOGEN FLD QL: 1 MG/DL
WBC # BLD: 13.92 K/UL — HIGH (ref 3.8–10.5)
WBC # FLD AUTO: 13.92 K/UL — HIGH (ref 3.8–10.5)
WBC UR QL: ABNORMAL /HPF (ref 0–5)

## 2022-06-11 PROCEDURE — 93010 ELECTROCARDIOGRAM REPORT: CPT

## 2022-06-11 PROCEDURE — 93005 ELECTROCARDIOGRAM TRACING: CPT

## 2022-06-11 PROCEDURE — G0463: CPT

## 2022-06-11 RX ORDER — SODIUM CHLORIDE 9 MG/ML
3 INJECTION INTRAMUSCULAR; INTRAVENOUS; SUBCUTANEOUS ONCE
Refills: 0 | Status: DISCONTINUED | OUTPATIENT
Start: 2022-06-14 | End: 2022-06-14

## 2022-06-11 RX ORDER — CEFPODOXIME PROXETIL 100 MG
0 TABLET ORAL
Qty: 0 | Refills: 0 | DISCHARGE

## 2022-06-11 NOTE — H&P PST ADULT - PROBLEM SELECTOR PLAN 2
Right ureteroscopy, laser lithotripsy, possible right percutaneous access and antegrade urethroscopy F/u with PCP for medical clearance

## 2022-06-11 NOTE — H&P PST ADULT - HISTORY OF PRESENT ILLNESS
Pt is a 67 years-old male seen today pre-op for septic stone right ureter, s/p IR placement right nephrostomy tube 5/31.  Pt is a 67 years-old male seen today pre-op for right ureteroscopy, laser lithotripsy, possible right percutaneous access and antegrade urethroscopy. Pt PMH includes HTN, hld, calculus kidney, copd, colon cancer/prostate cancer. Pt was recently admitted in [facility] for abdominal pain that radiates to right flank, nausea/emesis, Pt hospitalization workup revealed septic stone right ureter, Pt s/p IR placement right nephrostomy tube 5/31/2022. Pt today denies dysuria, hematuria, fever/chills, report discomfort and tenderness of right nephrostomy site. Pt seen today for a scheduled procedure on 6/14/2022 with Dr. Parekh.  Pt is a 67 years-old male seen today pre-op for right ureteroscopy, laser lithotripsy, possible right percutaneous access and antegrade ureteroscopy. Pt PMH includes HTN, hld, calculus kidney, copd, colon cancer/prostate cancer. Pt was recently admitted in [facility] for abdominal pain that radiates to right flank, nausea/emesis, Pt hospitalization workup revealed septic stone right ureter, Pt s/p IR placement right nephrostomy tube 5/31/2022. Pt today denies dysuria, hematuria, fever/chills, report discomfort and tenderness of right nephrostomy site. Pt seen today for a scheduled procedure on 6/14/2022 with Dr. Parekh.

## 2022-06-11 NOTE — H&P PST ADULT - ASSESSMENT
Pt is a 67 years-old male seen today pre-op for right ureteroscopy, laser lithotripsy, possible right percutaneous access and antegrade urethroscopy. Pt PMH includes HTN, hld, calculus kidney, copd, colon cancer/prostate cancer. Pt was recently admitted in [facility] for abdominal pain that radiates to right flank, nausea/emesis, Pt hospitalization workup revealed septic stone right ureter, Pt s/p IR placement right nephrostomy tube 2022. Pt today denies dysuria, hematuria, fever/chills, report discomfort and tenderness of right nephrostomy site. Pt seen today for a scheduled procedure on 2022 with Dr. Parekh. Procedure protocol reviewed with Pt today. Pt to follow-up with PCP for medical clearance  CAPRINI VTE 2.0 SCORE [CLOT updated 2019]    AGE RELATED RISK FACTORS                                                       MOBILITY RELATED FACTORS  [ ] Age 41-60 years                                            (1 Point)                    [ ] Bed rest                                                        (1 Point)  [x ] Age: 61-74 years                                           (2 Points)                  [ ] Plaster cast                                                   (2 Points)  [ ] Age= 75 years                                              (3 Points)                    [ ] Bed bound for more than 72 hours                 (2 Points)    DISEASE RELATED RISK FACTORS                                               GENDER SPECIFIC FACTORS  [x ] Edema in the lower extremities                       (1 Point)              [ ] Pregnancy                                                     (1 Point)  [ ] Varicose veins                                               (1 Point)                     [ ] Post-partum < 6 weeks                                   (1 Point)             [x ] BMI > 25 Kg/m2                                            (1 Point)                     [ ] Hormonal therapy  or oral contraception          (1 Point)                 [ ] Sepsis (in the previous month)                        (1 Point)               [ ] History of pregnancy complications                 (1 point)  [ ] Pneumonia or serious lung disease                                               [ ] Unexplained or recurrent                     (1 Point)           (in the previous month)                               (1 Point)  [ ] Abnormal pulmonary function test                     (1 Point)                 SURGERY RELATED RISK FACTORS  [ ] Acute myocardial infarction                              (1 Point)               [ ]  Section                                             (1 Point)  [ ] Congestive heart failure (in the previous month)  (1 Point)      [ ] Minor surgery                                                  (1 Point)   [ ] Inflammatory bowel disease                             (1 Point)               [ ] Arthroscopic surgery                                        (2 Points)  [ ] Central venous access                                      (2 Points)                [x ] General surgery lasting more than 45 minutes (2 points)  [x ] Malignancy- Present or previous                   (2 Points)                [ ] Elective arthroplasty                                         (5 points)    [ ] Stroke (in the previous month)                          (5 Points)                                                                                                                                                           HEMATOLOGY RELATED FACTORS                                                 TRAUMA RELATED RISK FACTORS  [ ] Prior episodes of VTE                                     (3 Points)                [ ] Fracture of the hip, pelvis, or leg                       (5 Points)  [ ] Positive family history for VTE                         (3 Points)             [ ] Acute spinal cord injury (in the previous month)  (5 Points)  [ ] Prothrombin 01749 A                                     (3 Points)               [ ] Paralysis  (less than 1 month)                             (5 Points)  [ ] Factor V Leiden                                             (3 Points)                  [ ] Multiple Trauma within 1 month                        (5 Points)  [ ] Lupus anticoagulants                                     (3 Points)                                                           [ ] Anticardiolipin antibodies                               (3 Points)                                                       [ ] High homocysteine in the blood                      (3 Points)                                             [ ] Other congenital or acquired thrombophilia      (3 Points)                                                [ ] Heparin induced thrombocytopenia                  (3 Points)                                     Total Score [     8     ]   OPIOID RISK TOOL    MIKE EACH BOX THAT APPLIES AND ADD TOTALS AT THE END    FAMILY HISTORY OF SUBSTANCE ABUSE                 FEMALE         MALE                                                Alcohol                             [  ]1 pt          [  ]3pts                                               Illegal Durgs                     [  ]2 pts        [  ]3pts                                               Rx Drugs                           [  ]4 pts        [  ]4 pts    PERSONAL HISTORY OF SUBSTANCE ABUSE                                                                                          Alcohol                             [  ]3 pts       [  ]3 pts                                               Illegal Drugs                     [  ]4 pts        [  ]4 pts                                               Rx Drugs                           [  ]5 pts        [  ]5 pts    AGE BETWEEN 16-45 YEARS                                      [  ]1 pt         [  ]1 pt    HISTORY OF PREADOLESCENT   SEXUAL ABUSE                                                             [  ]3 pts        [  ]0pts    PSYCHOLOGICAL DISEASE                     ADD, OCD, Bipolar, Schizophrenia        [  ]2 pts         [  ]2 pts                      Depression                                               [  ]1 pt           [  ]1 pt           SCORING TOTAL   (add numbers and type here)              (*0**)                                     A score of 3 or lower indicated LOW risk for future opioid abuse  A score of 4 to 7 indicated moderate risk for future opioid abuse  A score of 8 or higher indicates a high risk for opioid abuse

## 2022-06-11 NOTE — H&P PST ADULT - NSICDXFAMILYHX_GEN_ALL_CORE_FT
FAMILY HISTORY:  Mother  Still living? Unknown  FHx: breast cancer, Age at diagnosis: Age Unknown

## 2022-06-11 NOTE — H&P PST ADULT - NSICDXPASTMEDICALHX_GEN_ALL_CORE_FT
PAST MEDICAL HISTORY:  At risk for sleep apnea     Calculus of kidney in male     Colon cancer     COPD, mild     Epididymo-orchitis     HTN (hypertension)     Hyperlipidemia     Obese     Prostate CA s/p radiation 2013

## 2022-06-11 NOTE — H&P PST ADULT - NSICDXPASTSURGICALHX_GEN_ALL_CORE_FT
PAST SURGICAL HISTORY:  History of appendectomy 2000    History of colon resection 2012    History of orchiectomy right

## 2022-06-12 LAB
BLD GP AB SCN SERPL QL: SIGNIFICANT CHANGE UP
SARS-COV-2 RNA SPEC QL NAA+PROBE: SIGNIFICANT CHANGE UP

## 2022-06-13 ENCOUNTER — TRANSCRIPTION ENCOUNTER (OUTPATIENT)
Age: 67
End: 2022-06-13

## 2022-06-13 LAB
CULTURE RESULTS: SIGNIFICANT CHANGE UP
SPECIMEN SOURCE: SIGNIFICANT CHANGE UP

## 2022-06-14 ENCOUNTER — TRANSCRIPTION ENCOUNTER (OUTPATIENT)
Age: 67
End: 2022-06-14

## 2022-06-14 ENCOUNTER — APPOINTMENT (OUTPATIENT)
Dept: UROLOGY | Facility: HOSPITAL | Age: 67
End: 2022-06-14

## 2022-06-14 ENCOUNTER — OUTPATIENT (OUTPATIENT)
Dept: OUTPATIENT SERVICES | Facility: HOSPITAL | Age: 67
LOS: 1 days | End: 2022-06-14
Payer: MEDICARE

## 2022-06-14 VITALS
TEMPERATURE: 98 F | OXYGEN SATURATION: 98 % | RESPIRATION RATE: 16 BRPM | DIASTOLIC BLOOD PRESSURE: 83 MMHG | HEART RATE: 109 BPM | WEIGHT: 257.94 LBS | SYSTOLIC BLOOD PRESSURE: 130 MMHG | HEIGHT: 69 IN

## 2022-06-14 VITALS
SYSTOLIC BLOOD PRESSURE: 129 MMHG | HEART RATE: 105 BPM | TEMPERATURE: 98 F | OXYGEN SATURATION: 98 % | RESPIRATION RATE: 20 BRPM | DIASTOLIC BLOOD PRESSURE: 82 MMHG

## 2022-06-14 DIAGNOSIS — Z90.79 ACQUIRED ABSENCE OF OTHER GENITAL ORGAN(S): Chronic | ICD-10-CM

## 2022-06-14 DIAGNOSIS — Z90.49 ACQUIRED ABSENCE OF OTHER SPECIFIED PARTS OF DIGESTIVE TRACT: Chronic | ICD-10-CM

## 2022-06-14 DIAGNOSIS — N20.0 CALCULUS OF KIDNEY: ICD-10-CM

## 2022-06-14 PROCEDURE — C1889: CPT

## 2022-06-14 PROCEDURE — 76000 FLUOROSCOPY <1 HR PHYS/QHP: CPT

## 2022-06-14 PROCEDURE — C2617: CPT

## 2022-06-14 PROCEDURE — 50389 REMOVE RENAL TUBE W/FLUORO: CPT | Mod: RT

## 2022-06-14 PROCEDURE — 52344 CYSTO/URETERO STRICTURE TX: CPT | Mod: RT,59

## 2022-06-14 PROCEDURE — 52356 CYSTO/URETERO W/LITHOTRIPSY: CPT | Mod: RT

## 2022-06-14 PROCEDURE — 50081 PERQ NL/PL LITHOTRP CPLX>2CM: CPT | Mod: RT

## 2022-06-14 PROCEDURE — C1769: CPT

## 2022-06-14 PROCEDURE — C1894: CPT

## 2022-06-14 PROCEDURE — 50431 NJX PX NFROSGRM &/URTRGRM: CPT | Mod: RT,59

## 2022-06-14 PROCEDURE — 74420 UROGRAPHY RTRGR +-KUB: CPT | Mod: 26,RT

## 2022-06-14 PROCEDURE — 50389 REMOVE RENAL TUBE W/FLUORO: CPT | Mod: RT,59

## 2022-06-14 PROCEDURE — C1726: CPT

## 2022-06-14 DEVICE — IMPLANTABLE DEVICE: Type: IMPLANTABLE DEVICE | Status: FUNCTIONAL

## 2022-06-14 DEVICE — WIRE SENSORFLX STR .035 X 150CM: Type: IMPLANTABLE DEVICE | Status: FUNCTIONAL

## 2022-06-14 DEVICE — BASKET ZEROTIP NITINOL 1.9FR 120CM X 12MM 4 WIRES: Type: IMPLANTABLE DEVICE | Status: FUNCTIONAL

## 2022-06-14 DEVICE — CATH URET AXCESS 6FR 70CM: Type: IMPLANTABLE DEVICE | Status: FUNCTIONAL

## 2022-06-14 DEVICE — GWIRE SENS NIT 0.035INX150CM: Type: IMPLANTABLE DEVICE | Status: FUNCTIONAL

## 2022-06-14 DEVICE — LASER FIBER FLEXIVA 242 HI POWER: Type: IMPLANTABLE DEVICE | Status: FUNCTIONAL

## 2022-06-14 DEVICE — LASER FIBER FLEXIVA 365: Type: IMPLANTABLE DEVICE | Status: FUNCTIONAL

## 2022-06-14 DEVICE — LASER FIBER SOLTIVE 200 BALL TIP: Type: IMPLANTABLE DEVICE | Status: FUNCTIONAL

## 2022-06-14 RX ORDER — PHENAZOPYRIDINE HCL 100 MG
1 TABLET ORAL
Qty: 6 | Refills: 0
Start: 2022-06-14 | End: 2022-06-15

## 2022-06-14 RX ORDER — MONTELUKAST 4 MG/1
1 TABLET, CHEWABLE ORAL
Qty: 0 | Refills: 0 | DISCHARGE

## 2022-06-14 RX ORDER — METOPROLOL TARTRATE 50 MG
1 TABLET ORAL
Qty: 0 | Refills: 0 | DISCHARGE

## 2022-06-14 RX ORDER — SODIUM CHLORIDE 9 MG/ML
1000 INJECTION, SOLUTION INTRAVENOUS
Refills: 0 | Status: DISCONTINUED | OUTPATIENT
Start: 2022-06-14 | End: 2022-06-14

## 2022-06-14 RX ORDER — ONDANSETRON 8 MG/1
4 TABLET, FILM COATED ORAL ONCE
Refills: 0 | Status: DISCONTINUED | OUTPATIENT
Start: 2022-06-14 | End: 2022-06-14

## 2022-06-14 RX ORDER — HYDROMORPHONE HYDROCHLORIDE 2 MG/ML
0.5 INJECTION INTRAMUSCULAR; INTRAVENOUS; SUBCUTANEOUS
Refills: 0 | Status: DISCONTINUED | OUTPATIENT
Start: 2022-06-14 | End: 2022-06-14

## 2022-06-14 RX ORDER — LOSARTAN POTASSIUM 100 MG/1
1 TABLET, FILM COATED ORAL
Qty: 0 | Refills: 0 | DISCHARGE

## 2022-06-14 RX ORDER — TAMSULOSIN HYDROCHLORIDE 0.4 MG/1
1 CAPSULE ORAL
Qty: 0 | Refills: 0 | DISCHARGE

## 2022-06-14 RX ORDER — ASPIRIN/CALCIUM CARB/MAGNESIUM 324 MG
1 TABLET ORAL
Qty: 0 | Refills: 0 | DISCHARGE

## 2022-06-14 RX ORDER — OXYBUTYNIN CHLORIDE 5 MG
1 TABLET ORAL
Qty: 42 | Refills: 0
Start: 2022-06-14 | End: 2022-06-27

## 2022-06-14 RX ORDER — CEFPODOXIME PROXETIL 100 MG
0 TABLET ORAL
Qty: 0 | Refills: 0 | DISCHARGE

## 2022-06-14 RX ORDER — ACETAMINOPHEN 500 MG
975 TABLET ORAL ONCE
Refills: 0 | Status: COMPLETED | OUTPATIENT
Start: 2022-06-14 | End: 2022-06-14

## 2022-06-14 RX ORDER — SIMVASTATIN 20 MG/1
1 TABLET, FILM COATED ORAL
Qty: 0 | Refills: 0 | DISCHARGE

## 2022-06-14 RX ADMIN — HYDROMORPHONE HYDROCHLORIDE 0.5 MILLIGRAM(S): 2 INJECTION INTRAMUSCULAR; INTRAVENOUS; SUBCUTANEOUS at 17:47

## 2022-06-14 RX ADMIN — Medication 975 MILLIGRAM(S): at 12:46

## 2022-06-14 NOTE — BRIEF OPERATIVE NOTE - OPERATION/FINDINGS
access through existing NT  peal away sheath  antegrade ureteroscopy with laser lithotripsy  UO dilated with balloon  8/26 stent with no string  bladder drained then wheeler removed

## 2022-06-14 NOTE — ASU DISCHARGE PLAN (ADULT/PEDIATRIC) - CARE PROVIDER_API CALL
Damián Parekh)  Urology  [address], [place] [address]  Phone: [phone]  Fax: [phone]  Follow Up Time:

## 2022-06-14 NOTE — ASU PREOP CHECKLIST - SIDE RAILS UP
May 9, 2022     Patient: Jose Manuel Ashton  YOB: 2005  Date of Visit: 5/9/2022      To Whom it May Concern:    Aldo Bradshaw is under my professional care  Yvette Rucker was seen in my office on 5/9/2022  Yvettekrista Rucker may return to work on 5-9-22 with no restrictions  If you have any questions or concerns, please don't hesitate to call           Sincerely,          Ervin Barlow, DO        CC: No Recipients done

## 2022-06-14 NOTE — ASU DISCHARGE PLAN (ADULT/PEDIATRIC) - NS MD DC FALL RISK RISK
For information on Fall & Injury Prevention, visit: [email or web address] OR  [email or web address] OR  https://www.cdc.gov/steadi/patient.html

## 2022-06-14 NOTE — ASU DISCHARGE PLAN (ADULT/PEDIATRIC) - ASU DC SPECIAL INSTRUCTIONSFT
can shower ok to get small right flank  ( old nephrostomy site ) wet can clean with soap and water. use band aid to cover.  Call office of Dr. Parekh will arrange with you for follow up in 2 weeks for cystoscopy and removal of the stent. ( Dr. Parekh will arrange with you )

## 2022-06-15 PROBLEM — N20.0 CALCULUS OF KIDNEY: Chronic | Status: ACTIVE | Noted: 2022-06-11

## 2022-06-15 PROBLEM — E66.9 OBESITY, UNSPECIFIED: Chronic | Status: ACTIVE | Noted: 2022-06-11

## 2022-06-15 PROBLEM — E78.5 HYPERLIPIDEMIA, UNSPECIFIED: Chronic | Status: ACTIVE | Noted: 2022-06-11

## 2022-06-15 PROBLEM — Z91.89 OTHER SPECIFIED PERSONAL RISK FACTORS, NOT ELSEWHERE CLASSIFIED: Chronic | Status: ACTIVE | Noted: 2022-06-11

## 2022-06-15 PROBLEM — C18.9 MALIGNANT NEOPLASM OF COLON, UNSPECIFIED: Chronic | Status: ACTIVE | Noted: 2022-06-11

## 2022-06-28 ENCOUNTER — RX RENEWAL (OUTPATIENT)
Age: 67
End: 2022-06-28

## 2022-06-28 ENCOUNTER — APPOINTMENT (OUTPATIENT)
Dept: UROLOGY | Facility: CLINIC | Age: 67
End: 2022-06-28

## 2022-06-28 VITALS — SYSTOLIC BLOOD PRESSURE: 98 MMHG | DIASTOLIC BLOOD PRESSURE: 66 MMHG | HEART RATE: 83 BPM

## 2022-06-28 PROCEDURE — 52000 CYSTOURETHROSCOPY: CPT

## 2022-07-15 ENCOUNTER — OUTPATIENT (OUTPATIENT)
Dept: OUTPATIENT SERVICES | Facility: HOSPITAL | Age: 67
LOS: 1 days | End: 2022-07-15

## 2022-07-15 ENCOUNTER — APPOINTMENT (OUTPATIENT)
Dept: ULTRASOUND IMAGING | Facility: CLINIC | Age: 67
End: 2022-07-15

## 2022-07-15 DIAGNOSIS — Z90.79 ACQUIRED ABSENCE OF OTHER GENITAL ORGAN(S): Chronic | ICD-10-CM

## 2022-07-15 DIAGNOSIS — Z90.49 ACQUIRED ABSENCE OF OTHER SPECIFIED PARTS OF DIGESTIVE TRACT: Chronic | ICD-10-CM

## 2022-07-15 DIAGNOSIS — N20.0 CALCULUS OF KIDNEY: ICD-10-CM

## 2022-07-15 PROCEDURE — 76770 US EXAM ABDO BACK WALL COMP: CPT | Mod: 26

## 2022-07-18 NOTE — H&P PST ADULT - NS MD HP INPLANTS MED DEV
"Rachel Lester RN   Phone [phone]  Fax [phone]    Che Quigley (22 y.o. Female)             Date of Birth   2000    Social Security Number       Address   [address] [place] [address]    Home Phone   [phone]    MRN   [id number]       [facility]    Marital Status                               Admission Date   22    Admission Type   Emergency    Admitting Provider   Michelle Joseph MD    Attending Provider       Department, Room/Bed   [facility] 3E, S346/1       Discharge Date   2022    Discharge Disposition   Home or Self Care    Discharge Destination                               Attending Provider: (none)   Allergies: Sulfa Antibiotics, Bactrim [Sulfamethoxazole-trimethoprim]    Isolation: None   Infection: None   Code Status: Prior   Advance Care Planning Activity    Ht: 162.6 cm (64\")   Wt: 97.8 kg (215 lb 9.6 oz)    Admission Cmt: None   Principal Problem: Diabetic ketoacidosis without coma associated with other specified diabetes mellitus (HCC) [E13.10]                 Active Insurance as of 2022     Primary Coverage     Payor Plan Insurance Group Employer/Plan Group    ANTHEM MEDICAID ANTHEM MEDICAID [id number]     Payor Plan Address Payor Plan Phone Number Payor Plan Fax Number Effective Dates    [address] [address] [phone]  2022 - None Entered    [facility] [id number]       Subscriber Name Subscriber Birth Date Member ID       CHE QUIGLEY 2000 [id number]                 Emergency Contacts      (Rel.) Home Phone Work Phone Mobile Phone    JOVIORLANDO (Spouse) [phone] --    JOSE JUAN QUIGLEY (Father) [phone] -- --               Discharge Summary      Michelle Joseph MD at [address] [facility]              [facility] Medicine Services  DISCHARGE SUMMARY    Patient Name: Che Quigley  : 2000  MRN: [id number]    Date of Admission: 2022  5:57 PM  Date of " Discharge: 7/11/22  Primary Care Physician: Tawnya Lynne, EVER    Consults     No orders found from 6/8/2022 to 7/8/2022.          Hospital Course     Presenting Problem:   Diabetic ketoacidosis without coma associated with other specified diabetes mellitus (HCC) [E13.10]    Active Hospital Problems    Diagnosis  POA   • **Diabetic ketoacidosis without coma associated with other specified diabetes mellitus (HCC) [E13.10]  Yes   • Obesity (BMI 30-39.9) [E66.9]  Yes      Resolved Hospital Problems   No resolved problems to display.          Hospital Course:  Soledad Quigley is a 22 y.o. female with PMH significant for gestational DM and HTN, anxiety, hypothyroid, obesity.  She came to ED with months of polyuria and polydipsia, worse in the past two weeks.  She takes no home meds and does not have a PCP.  In the ED, her glucose was 508 and anion gap 23.  Ph was 7.2        DKA  New diagnosis of diabetes  - A1C 11.8  - Patient placed on insulin upon admission with IVF. Improved greatly  - was placed on basal/bolus dosing with adequate BS- ultimately discharged on 26 units long acting and 7 units with meals   - plan on referral to a [facility] endocrinologist [place] - CM worked on arranging appointment at [place].  Getting DM teaching here. Patient felt comfortable with plan at time of discharge and was comfortable with 4-shot regimen at discharge  - she has good insight and motivation and would probably do well on a 4-shot daily regimen.  - was counseled by nutrition on diet education- motivated to loose weight and improve nutrition  - PCP found at [place] by OFELIA and set up for patient           Discharge Follow Up Recommendations for outpatient labs/diagnostics:  PCP as scheduled by OFELIA  Endocrine first available     Day of Discharge     HPI:   Doing well. Feels good to go home. Feels comfortable with insulin. No other questions     Review of Systems  Gen- No fevers, chills  CV- No chest pain, palpitations  Resp- No cough,  dyspnea  GI- No N/V/D, abd pain        Vital Signs:   Temp:  [98 °F (36.7 °C)-98.5 °F (36.9 °C)] 98.5 °F (36.9 °C)  Heart Rate:  [] 76  Resp:  [17-20] 18  BP: (114-141)/(81-94) 128/94      Physical Exam:  GEN: NAD, resting in bed, awake  HEENT: on room air, atraumatic, normocephalic  NECK: supple, no masses  RESP: on room air, normal effort  CV: on tele, sinus rhythm  PSYCH: normal affect, appropriate  NEURO: awake, alert, no focal deficits noted  MSK: no edema noted  SKIN: no rashes noted       Pertinent  and/or Most Recent Results     LAB RESULTS:      Lab 07/09/22  0531 07/07/22  1718   WBC 6.59 7.72   HEMOGLOBIN 12.5 14.9   HEMATOCRIT 36.5 42.3   PLATELETS 231 250   NEUTROS ABS  --  5.22   IMMATURE GRANS (ABS)  --  0.04   LYMPHS ABS  --  1.84   MONOS ABS  --  0.49   EOS ABS  --  0.06   MCV 89.0 87.0         Lab 07/11/22  0355 07/10/22  1133 07/09/22  0531 07/08/22  1855 07/08/22  1357 07/08/22  0826 07/07/22 2013 07/07/22  1718   SODIUM 141 140 138 135* 136 138   < > 131*   POTASSIUM 3.6 3.2* 3.5 3.1* 3.6 3.3*   < > 3.9   CHLORIDE 106 105 111* 109* 112* 112*   < > 99   CO2 24.0 24.0 15.0* 15.0* 14.0* 12.0*   < > 9.0*   ANION GAP 11.0 11.0 12.0 11.0 10.0 14.0   < > 23.0*   BUN 5* 4* 2* 3* 4* 5*   < > 10   CREATININE 0.34* 0.34* 0.41* 0.36* 0.35* 0.43*   < > 0.81   EGFR 147.6 147.6 141.1 145.6 146.6 139.5   < > 104.1   GLUCOSE 187* 252* 183* 144* 139* 143*   < > 508*   CALCIUM 8.1* 8.4* 8.2* 8.0* 7.3* 7.2*   < > 9.2   MAGNESIUM  --  1.7 1.9 2.0 2.1 2.0   < > 1.8   PHOSPHORUS  --  3.1 3.6 1.1* 1.4* 1.3*   < > 2.4*   HEMOGLOBIN A1C  --   --   --   --   --   --   --  11.80*   TSH  --   --   --   --   --   --   --  5.370*    < > = values in this interval not displayed.         Lab 07/07/22  1718   TOTAL PROTEIN 8.0   ALBUMIN 4.60   GLOBULIN 3.4   ALT (SGPT) 21   AST (SGOT) 15   BILIRUBIN 0.7   ALK PHOS 97                     Lab 07/09/22  1214 07/07/22  1931   PH, ARTERIAL 7.376 7.197*   PCO2, ARTERIAL 31.2*  16.7*   PO2 ART 85.0 108.0   FIO2 21 21   HCO3 ART 18.3* 6.5*   BASE EXCESS ART -5.9* -19.3*   CARBOXYHEMOGLOBIN 1.4 1.1     Brief Urine Lab Results  (Last result in the past 365 days)      Color   Clarity   Blood   Leuk Est   Nitrite   Protein   CREAT   Urine HCG        07/07/22 1955               Negative           Microbiology Results (last 10 days)     Procedure Component Value - Date/Time    COVID PRE-OP / PRE-PROCEDURE SCREENING ORDER (NO ISOLATION) - Swab, Nasopharynx [[id number]]  (Normal) Collected: 07/07/22 1934    Lab Status: Final result Specimen: Swab from Nasopharynx Updated: 07/07/22 2004    Narrative:      The following orders were created for panel order COVID PRE-OP / PRE-PROCEDURE SCREENING ORDER (NO ISOLATION) - Swab, Nasopharynx.  Procedure                               Abnormality         Status                     ---------                               -----------         ------                     COVID-19 and FLU A/B PCR...[[id number]]  Normal              Final result                 Please view results for these tests on the individual orders.    COVID-19 and FLU A/B PCR - Swab, Nasopharynx [[id number]]  (Normal) Collected: 07/07/22 1934    Lab Status: Final result Specimen: Swab from Nasopharynx Updated: 07/07/22 2004     COVID19 Not Detected     Influenza A PCR Not Detected     Influenza B PCR Not Detected    Narrative:      Fact sheet for providers: https://www.fda.gov/media/266761/download    Fact sheet for patients: https://www.fda.gov/media/836449/download    Test performed by PCR.    Urine Culture - Urine, Urine, Clean Catch [[id number]] Collected: 07/07/22 1920    Lab Status: Final result Specimen: Urine, Clean Catch Updated: 07/09/22 0946     Urine Culture >100,000 CFU/mL Mixed Winnie Isolated    Narrative:      Specimen contains mixed organisms of questionable pathogenicity suggestive of contamination. If symptoms persist, suggest recollection.  Colonization of the urinary tract  without infection is common. Treatment is discouraged unless the patient is symptomatic, pregnant, or undergoing an invasive urologic procedure.          XR Chest 1 View    Result Date: 7/7/2022  DATE OF EXAM: 7/7/2022 7:41 PM  PROCEDURE: XR CHEST 1 VW-  INDICATIONS: dka; E13.10-Other specified diabetes mellitus with ketoacidosis without coma  COMPARISON: No comparisons available.  TECHNIQUE: Single radiographic AP view of the chest was obtained.  FINDINGS: The heart is not definitely enlarged. The lungs seem clear. There are no pleural effusions. The visualized osseous structures do not appear unusual.      1.  An acute pulmonary process is not apparent.  This report was finalized on 7/7/2022 7:57 PM by Florencio Ibrahim MD.                    Plan for Follow-up of Pending Labs/Results: none    Discharge Details        Discharge Medications      New Medications      Instructions Start Date   Alcohol Pads 70 % pads   Apply one alcohol swab to injection site of skin immediately prior to insulin injection. Formulary Compliance Approval.      glucose blood test strip   Use to test blood glucose up to four times daily as needed. Formulary Compliance Approval. Diagnosis: Type 2 Diabetes - Insulin Dependent      Glucose Management tablet   Use as needed for emergently low blood glucose levels. Formulary Compliance Approval      glucose monitor monitoring kit   Use to test blood glucose up to four times daily as needed. Formulary Compliance Approval. Diagnosis: Type 2 Diabetes - Insulin Dependent      Insulin Lispro (1 Unit Dial) 100 UNIT/ML solution pen-injector  Commonly known as: HumaLOG KwikPen   7 Units, Subcutaneous, 3 Times Daily With Meals, Formulary Compliance Approval      Lancets misc   Use to test blood glucose up to four times daily as needed. Formulary Compliance Approval. Diagnosis: Type 2 Diabetes - Insulin Dependent      Levemir FlexTouch 100 UNIT/ML injection  Generic drug: insulin detemir   26 Units,  Subcutaneous, Nightly, Formulary Compliance Approval      Pen Needles 32G X 4 MM misc   1 each, Subcutaneous, 4 Times Daily PRN, Formulary Compliance Approval             Allergies   Allergen Reactions   • Sulfa Antibiotics Diarrhea, Nausea And Vomiting and GI Intolerance   • Bactrim [Sulfamethoxazole-Trimethoprim] Diarrhea, Nausea And Vomiting and GI Intolerance         Discharge Disposition:  Home or Self Care    Diet:  Hospital:  Diet Order   Procedures   • Diet Regular; Consistent Carbohydrate       Activity:  As tolerated      Restrictions or Other Recommendations:  As tolerated       CODE STATUS:    Code Status and Medical Interventions:   Ordered at: 07/07/22 2102     Code Status (Patient has no pulse and is not breathing):    CPR (Attempt to Resuscitate)     Medical Interventions (Patient has pulse or is breathing):    Full Support       Future Appointments   Date Time Provider Department Center   7/18/2022  2:15 PM Tawnya Lynne APRN [facility]       Additional Instructions for the Follow-ups that You Need to Schedule     Discharge Follow-up with PCP   As directed       Currently Documented PCP:    Tawnya Lynne APRN    PCP Phone Number:    [phone]     Follow Up Details: PCP as CM arranged         Discharge Follow-up with Specified Provider: Endocrine as arranged first available   As directed      To: Endocrine as arranged first available                     Michelle Joseph MD  07/11/22      Time Spent on Discharge:  I spent  35 minutes on this discharge activity which included: face-to-face encounter with the patient, reviewing the data in the system, coordination of the care with the nursing staff as well as consultants, documentation, and entering orders.            Electronically signed by Michelle Joseph MD at 07/11/22 8759        None

## 2022-07-20 ENCOUNTER — APPOINTMENT (OUTPATIENT)
Dept: UROLOGY | Facility: CLINIC | Age: 67
End: 2022-07-20

## 2022-07-20 PROCEDURE — 99213 OFFICE O/P EST LOW 20 MIN: CPT

## 2022-07-21 NOTE — HISTORY OF PRESENT ILLNESS
[FreeTextEntry1] : 66 yo M\par complicated medical history\par previously orchiectomy due to scrotal infection, that was complicated with an abscess and requiring drainage and prolonged abx treatment\par \par recently a large distal ureteral stone\par stent would not advance, drained with NT\par s/p antegrade ureteroscopy and laser lithotripsy with stent placement\par stent removed after\par \par he is here for follow up\par feeling well\par new ultrasound: no stones, no hydronephrosis\par on exam, skin incision healing\par \par plan:\par - next visit with new ultrasound in 4 months

## 2022-07-21 NOTE — PHYSICAL EXAM
[General Appearance - Well Developed] : well developed [General Appearance - Well Nourished] : well nourished [Normal Appearance] : normal appearance [Well Groomed] : well groomed [General Appearance - In No Acute Distress] : no acute distress [] : no respiratory distress [Edema] : no peripheral edema [Respiration, Rhythm And Depth] : normal respiratory rhythm and effort [Exaggerated Use Of Accessory Muscles For Inspiration] : no accessory muscle use [Oriented To Time, Place, And Person] : oriented to person, place, and time [Affect] : the affect was normal [Mood] : the mood was normal [Not Anxious] : not anxious

## 2023-01-06 NOTE — H&P PST ADULT - SURGICAL SITE INCISION
Graft Donor Site Bandage (Optional-Leave Blank If You Don't Want In Note): Steri-strips and a pressure bandage were applied to the donor site. no

## 2023-01-13 ENCOUNTER — APPOINTMENT (OUTPATIENT)
Dept: ULTRASOUND IMAGING | Facility: CLINIC | Age: 68
End: 2023-01-13
Payer: MEDICARE

## 2023-01-13 ENCOUNTER — OUTPATIENT (OUTPATIENT)
Dept: OUTPATIENT SERVICES | Facility: HOSPITAL | Age: 68
LOS: 1 days | End: 2023-01-13

## 2023-01-13 DIAGNOSIS — Z90.49 ACQUIRED ABSENCE OF OTHER SPECIFIED PARTS OF DIGESTIVE TRACT: Chronic | ICD-10-CM

## 2023-01-13 DIAGNOSIS — Z90.79 ACQUIRED ABSENCE OF OTHER GENITAL ORGAN(S): Chronic | ICD-10-CM

## 2023-01-13 DIAGNOSIS — N20.0 CALCULUS OF KIDNEY: ICD-10-CM

## 2023-01-13 PROCEDURE — 76770 US EXAM ABDO BACK WALL COMP: CPT | Mod: 26

## 2023-01-20 ENCOUNTER — APPOINTMENT (OUTPATIENT)
Dept: UROLOGY | Facility: CLINIC | Age: 68
End: 2023-01-20
Payer: COMMERCIAL

## 2023-01-20 VITALS — SYSTOLIC BLOOD PRESSURE: 117 MMHG | DIASTOLIC BLOOD PRESSURE: 76 MMHG

## 2023-01-20 PROCEDURE — 99213 OFFICE O/P EST LOW 20 MIN: CPT

## 2023-01-20 NOTE — ASSESSMENT
[FreeTextEntry1] : \par plan:\par - next visit with office ultrasound in 1 year\par - PSA regularly tested with PCP

## 2023-01-20 NOTE — PHYSICAL EXAM
[General Appearance - Well Developed] : well developed [General Appearance - Well Nourished] : well nourished [Normal Appearance] : normal appearance [Well Groomed] : well groomed [General Appearance - In No Acute Distress] : no acute distress [Edema] : no peripheral edema [] : no respiratory distress [Respiration, Rhythm And Depth] : normal respiratory rhythm and effort [Exaggerated Use Of Accessory Muscles For Inspiration] : no accessory muscle use [Oriented To Time, Place, And Person] : oriented to person, place, and time [Affect] : the affect was normal [Mood] : the mood was normal [Not Anxious] : not anxious [FreeTextEntry1] : with cane

## 2023-01-20 NOTE — HISTORY OF PRESENT ILLNESS
[FreeTextEntry1] : 68 yo M\par complicated medical history last year\par required multiple urologic procedures due to severe scrotal infections\par also had a large obstructing ureteral stone requiring ureteroscopy\par \par feeling well, no complains\par on exam scrotum normal\par US: no hydronephrosis and no stones\par \par plan:\par - next visit with office ultrasound in 1 year\par - PSA regularly tested with PCP

## 2023-02-02 ENCOUNTER — APPOINTMENT (OUTPATIENT)
Dept: ORTHOPEDIC SURGERY | Facility: CLINIC | Age: 68
End: 2023-02-02
Payer: MEDICARE

## 2023-02-02 VITALS
SYSTOLIC BLOOD PRESSURE: 108 MMHG | HEART RATE: 77 BPM | BODY MASS INDEX: 42.44 KG/M2 | DIASTOLIC BLOOD PRESSURE: 75 MMHG | WEIGHT: 280 LBS | HEIGHT: 68 IN

## 2023-02-02 DIAGNOSIS — M25.531 PAIN IN RIGHT WRIST: ICD-10-CM

## 2023-02-02 PROCEDURE — 20600 DRAIN/INJ JOINT/BURSA W/O US: CPT | Mod: RT

## 2023-02-02 PROCEDURE — 20550 NJX 1 TENDON SHEATH/LIGAMENT: CPT | Mod: 59,RT

## 2023-02-02 PROCEDURE — 73110 X-RAY EXAM OF WRIST: CPT | Mod: 50

## 2023-02-02 PROCEDURE — 99204 OFFICE O/P NEW MOD 45 MIN: CPT | Mod: 25

## 2023-02-02 NOTE — ASSESSMENT
[FreeTextEntry1] : ASSESSMENT:\par \par [The patient was accompanied today and was assisted with explaining their complaints today.]\par The patient comes in today with chronic exacerbated symptoms of right wrist tendinopathy as well as exacerbation of basal joint arthritis more so on the right thumb.\par [I have diagnosed the patient today with a new diagnosis - This may diminish bodily function for the extremity.] \par \par [The patient was given an injection(s) today.]\par Injection:\par \par The risks and benefits of a steroid injection were discussed in detail. The risks include but are not limited to: pain, infection, swelling, flare response, bleeding, subcutaneous fat atrophy, skin depigmentation and/or elevation of blood sugar. The risk of incomplete resolution of symptoms, recurrence and additional intervention was reviewed and considered by the patient. \par The patient agreed to proceed and under a sterile prep, I injected 1 unit into 2 cc of a combination of Celestone and Lidocaine into the right wrist first dorsal compartment and right thumb CMC joint. The patient tolerated the injection well.\par \par The patient was adequately and thoroughly informed of my assessment of their current condition(s). \par \par \par DISCUSSION:\par 1.  I am hopeful he improves with the injection of the right wrist as well as the right basal joint\par \par 2.  He will continue wearing his wrist brace as needed\par \par 3.  I would like to see him back in 3 months time for a clinical assessment\par \par \par

## 2023-02-02 NOTE — PHYSICAL EXAM
[de-identified] : He is well-appearing on examination\par \par Examination at the level of the [right] wrist reveals tenderness at the level of the first dorsal compartment with a positive finkelstein.\par Examination of the [right] thumb basal joint reveals pain with compression of the CMC joint with a positive grind test for pain\par  [de-identified] : [4] views of bilateral hands and wrists were obtained today in my office and were seen by me and discussed with the patient. 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

## 2023-02-02 NOTE — HISTORY OF PRESENT ILLNESS
[FreeTextEntry1] : Harshil is a very pleasant 67-year-old male who presents today with his sister.  He has been complaining of chronic on and off right wrist and right thumb discomfort.  He has been wearing a wrist brace on and off for more than a year now and at this point he is having trouble with activities that require pinching

## 2023-02-24 ENCOUNTER — RX RENEWAL (OUTPATIENT)
Age: 68
End: 2023-02-24

## 2023-04-24 ENCOUNTER — APPOINTMENT (OUTPATIENT)
Dept: CARDIOLOGY | Facility: CLINIC | Age: 68
End: 2023-04-24
Payer: MEDICARE

## 2023-04-24 ENCOUNTER — NON-APPOINTMENT (OUTPATIENT)
Age: 68
End: 2023-04-24

## 2023-04-24 VITALS — DIASTOLIC BLOOD PRESSURE: 82 MMHG | SYSTOLIC BLOOD PRESSURE: 132 MMHG

## 2023-04-24 VITALS
HEIGHT: 68 IN | HEART RATE: 87 BPM | DIASTOLIC BLOOD PRESSURE: 72 MMHG | SYSTOLIC BLOOD PRESSURE: 128 MMHG | BODY MASS INDEX: 42.28 KG/M2 | TEMPERATURE: 99.1 F | WEIGHT: 279 LBS | OXYGEN SATURATION: 94 %

## 2023-04-24 DIAGNOSIS — R06.09 OTHER FORMS OF DYSPNEA: ICD-10-CM

## 2023-04-24 DIAGNOSIS — I10 ESSENTIAL (PRIMARY) HYPERTENSION: ICD-10-CM

## 2023-04-24 DIAGNOSIS — Z78.9 OTHER SPECIFIED HEALTH STATUS: ICD-10-CM

## 2023-04-24 PROCEDURE — 99204 OFFICE O/P NEW MOD 45 MIN: CPT | Mod: 25

## 2023-04-24 PROCEDURE — 93000 ELECTROCARDIOGRAM COMPLETE: CPT

## 2023-04-24 RX ORDER — ASPIRIN 81 MG
81 TABLET, DELAYED RELEASE (ENTERIC COATED) ORAL DAILY
Refills: 0 | Status: ACTIVE | COMMUNITY

## 2023-04-24 RX ORDER — LEVOFLOXACIN 500 MG/1
500 TABLET, FILM COATED ORAL DAILY
Qty: 14 | Refills: 0 | Status: DISCONTINUED | COMMUNITY
Start: 2022-02-04 | End: 2023-04-24

## 2023-04-24 RX ORDER — SULFAMETHOXAZOLE AND TRIMETHOPRIM 800; 160 MG/1; MG/1
800-160 TABLET ORAL TWICE DAILY
Qty: 14 | Refills: 0 | Status: DISCONTINUED | COMMUNITY
Start: 2022-04-04 | End: 2023-04-24

## 2023-04-24 RX ORDER — CIPROFLOXACIN HYDROCHLORIDE 500 MG/1
500 TABLET, FILM COATED ORAL
Qty: 28 | Refills: 0 | Status: DISCONTINUED | COMMUNITY
Start: 2021-10-12 | End: 2023-04-24

## 2023-04-24 RX ORDER — IBUPROFEN 800 MG/1
800 TABLET, FILM COATED ORAL EVERY 8 HOURS
Qty: 15 | Refills: 0 | Status: DISCONTINUED | COMMUNITY
Start: 2022-03-29 | End: 2023-04-24

## 2023-04-24 RX ORDER — SIMVASTATIN 40 MG/1
40 TABLET, FILM COATED ORAL
Refills: 0 | Status: ACTIVE | COMMUNITY

## 2023-04-24 RX ORDER — METOPROLOL TARTRATE 100 MG/1
100 TABLET, FILM COATED ORAL DAILY
Refills: 0 | Status: DISCONTINUED | COMMUNITY
End: 2023-04-24

## 2023-04-24 RX ORDER — HYDROCHLOROTHIAZIDE 25 MG/1
25 TABLET ORAL
Qty: 30 | Refills: 0 | Status: DISCONTINUED | COMMUNITY
End: 2023-04-24

## 2023-04-24 RX ORDER — LOSARTAN POTASSIUM AND HYDROCHLOROTHIAZIDE 12.5; 1 MG/1; MG/1
100-12.5 TABLET ORAL DAILY
Refills: 0 | Status: ACTIVE | COMMUNITY

## 2023-04-24 RX ORDER — DOXYCYCLINE HYCLATE 100 MG/1
100 TABLET ORAL
Qty: 28 | Refills: 0 | Status: DISCONTINUED | COMMUNITY
Start: 2022-01-21 | End: 2023-04-24

## 2023-04-24 RX ORDER — OXYBUTYNIN CHLORIDE 10 MG/1
10 TABLET, EXTENDED RELEASE ORAL
Qty: 30 | Refills: 0 | Status: DISCONTINUED | COMMUNITY
Start: 2022-03-29 | End: 2023-04-24

## 2023-04-24 RX ORDER — LEVOFLOXACIN 500 MG/1
500 TABLET, FILM COATED ORAL DAILY
Qty: 14 | Refills: 0 | Status: DISCONTINUED | COMMUNITY
Start: 2022-01-21 | End: 2023-04-24

## 2023-04-24 RX ORDER — IBUPROFEN 400 MG/1
400 TABLET, FILM COATED ORAL 3 TIMES DAILY
Qty: 30 | Refills: 0 | Status: DISCONTINUED | COMMUNITY
Start: 2021-10-12 | End: 2023-04-24

## 2023-04-24 RX ORDER — LEVOFLOXACIN 500 MG/1
500 TABLET, FILM COATED ORAL DAILY
Qty: 7 | Refills: 0 | Status: DISCONTINUED | COMMUNITY
Start: 2022-04-08 | End: 2023-04-24

## 2023-04-24 RX ORDER — DOXYCYCLINE HYCLATE 100 MG/1
100 CAPSULE ORAL
Qty: 28 | Refills: 2 | Status: DISCONTINUED | COMMUNITY
Start: 2021-10-12 | End: 2023-04-24

## 2023-05-01 PROBLEM — I10 HTN (HYPERTENSION): Status: ACTIVE | Noted: 2023-04-24

## 2023-05-01 PROBLEM — Z78.9 PATIENT UNABLE TO EXERCISE: Status: ACTIVE | Noted: 2023-04-24

## 2023-05-01 PROBLEM — R06.09 DOE (DYSPNEA ON EXERTION): Status: ACTIVE | Noted: 2023-04-24

## 2023-05-01 NOTE — CARDIOLOGY SUMMARY
[de-identified] : 4/24/20223\par Sinus  Rhythm \par -  Nonspecific T-abnormality. \par ABNORMAL \par

## 2023-05-01 NOTE — HISTORY OF PRESENT ILLNESS
[FreeTextEntry1] : \par WHALEN\par Pt admits WHALEN. His Physcial capacity s limited d/t he uses cave with walking. He has disc disease.\par Pt is smoker. 1ppd for 60 years\par Denied orthopnea, pnd, edema angina, syncope, near syncope\par \par \par CURRENT CARDIAC MEDS\par ASA 81 m,g daily (as per meds history, for unclear reasons, as per [facility] Office)\par Losartan -12.5 mg daily\par Simvastatin 40 mg daily\par \par \par FUNCTIONAL CAPACITY\par limited exercise tolerance, unable to exercise on treadmill\par walks with cane d/t disc disease\par \par

## 2023-05-01 NOTE — PHYSICAL EXAM
[Well Developed] : well developed [Well Nourished] : well nourished [No Acute Distress] : no acute distress [Normal Conjunctiva] : normal conjunctiva [Normal Venous Pressure] : normal venous pressure [No Carotid Bruit] : no carotid bruit [Normal S1, S2] : normal S1, S2 [No Murmur] : no murmur [No Rub] : no rub [No Gallop] : no gallop [Clear Lung Fields] : clear lung fields [Good Air Entry] : good air entry [No Respiratory Distress] : no respiratory distress  [Soft] : abdomen soft [Non Tender] : non-tender [No Masses/organomegaly] : no masses/organomegaly [Normal Bowel Sounds] : normal bowel sounds [No Edema] : no edema [No Cyanosis] : no cyanosis [No Clubbing] : no clubbing [No Varicosities] : no varicosities [No Rash] : no rash [No Skin Lesions] : no skin lesions [Moves all extremities] : moves all extremities [No Focal Deficits] : no focal deficits [Normal Speech] : normal speech [Alert and Oriented] : alert and oriented [Normal memory] : normal memory [Abnormal Gait] : abnormal gait

## 2023-05-02 ENCOUNTER — APPOINTMENT (OUTPATIENT)
Dept: ORTHOPEDIC SURGERY | Facility: CLINIC | Age: 68
End: 2023-05-02
Payer: MEDICARE

## 2023-05-02 VITALS
SYSTOLIC BLOOD PRESSURE: 92 MMHG | BODY MASS INDEX: 43.19 KG/M2 | DIASTOLIC BLOOD PRESSURE: 60 MMHG | HEIGHT: 68 IN | HEART RATE: 68 BPM | TEMPERATURE: 98.1 F | WEIGHT: 285 LBS

## 2023-05-02 PROCEDURE — 99214 OFFICE O/P EST MOD 30 MIN: CPT | Mod: 25

## 2023-05-02 PROCEDURE — 29125 APPL SHORT ARM SPLINT STATIC: CPT | Mod: 50

## 2023-05-02 NOTE — PHYSICAL EXAM
[de-identified] : He is well-appearing on examination\par \par Examination at the level of the left and right wrist reveals tenderness at the level of the first dorsal compartment with a positive finkelstein.\par Examination of the left and right thumb basal joint reveals pain with compression of the CMC joint with a positive grind test for pain\par \par Examination of the left and right wrist reveals discomfort with compression at the level of the volar carpal tunnel eliciting numbness/tingling throughout the fingertips\par [there is weakness with thumb abduction]\par  [de-identified] : [4] views of bilateral hands and wrists were d discussed with the patient. 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

## 2023-05-02 NOTE — REASON FOR VISIT
[Follow-Up Visit] : a follow-up visit for [Hand Pain] : hand pain [Spouse] : spouse [FreeTextEntry2] : bilateral hand pain

## 2023-05-02 NOTE — HISTORY OF PRESENT ILLNESS
[FreeTextEntry1] : Harshil returns for follow-up.  He has a known history of basal joint arthropathy for which she has tried activity modification and has had an injection for this.  The injection has helped tremendously.\par At this point in time he is complaining of worsening bilateral hand numbness and tingling waking him up at night and as well as having difficulty dropping objects.

## 2023-05-02 NOTE — ASSESSMENT
[FreeTextEntry1] : ASSESSMENT:\par \par [The patient was accompanied today and was assisted with explaining their complaints today.]\par The patient comes in today with chronic symptoms of basal joint arthropathy.  The injection did help however the symptoms have not fully resolved.  We did discuss potential injection of the future as well as other options including basal joint arthroplasty which would be a more definitive procedure.\par Currently he is complaining of worsening numbness and tingling in bilateral hands for which clinical examination also suggest carpal tunnel.  I do recommend bracing as well as obtaining a nerve study\par [I have diagnosed the patient today with a new diagnosis - This may diminish bodily function for the extremity.] \par \par [For this I was able to review other physician’s note(s) including reviewing other tests, imaging results as well as personally view these results for my own interpretation.]\par [I did send for further workup]\par Nerve study/EMG\par \par The patient was adequately and thoroughly informed of my assessment of their current condition(s). \par \par DISCUSSION:\par 1.  For his bilateral wrist discomfort and carpal tunnel symptoms today he was fitted with left and right wrist braces.  He tolerated placement of these 2.\par 2.  I will see him back in 6 weeks time if not sooner to discuss the nerve study\par 3.  We will continue watching his bilateral basal joint symptoms\par

## 2023-05-11 ENCOUNTER — APPOINTMENT (OUTPATIENT)
Dept: CARDIOLOGY | Facility: CLINIC | Age: 68
End: 2023-05-11
Payer: MEDICARE

## 2023-05-11 PROCEDURE — 93306 TTE W/DOPPLER COMPLETE: CPT

## 2023-05-22 ENCOUNTER — NON-APPOINTMENT (OUTPATIENT)
Age: 68
End: 2023-05-22

## 2023-05-23 ENCOUNTER — APPOINTMENT (OUTPATIENT)
Dept: CARDIOLOGY | Facility: CLINIC | Age: 68
End: 2023-05-23

## 2023-05-25 ENCOUNTER — APPOINTMENT (OUTPATIENT)
Dept: CARDIOLOGY | Facility: CLINIC | Age: 68
End: 2023-05-25
Payer: MEDICARE

## 2023-05-25 PROCEDURE — A9500: CPT

## 2023-05-25 PROCEDURE — 78452 HT MUSCLE IMAGE SPECT MULT: CPT

## 2023-05-25 PROCEDURE — 93015 CV STRESS TEST SUPVJ I&R: CPT

## 2023-06-02 ENCOUNTER — APPOINTMENT (OUTPATIENT)
Dept: CARDIOLOGY | Facility: CLINIC | Age: 68
End: 2023-06-02

## 2023-07-03 ENCOUNTER — APPOINTMENT (OUTPATIENT)
Dept: PHYSICAL MEDICINE AND REHAB | Facility: CLINIC | Age: 68
End: 2023-07-03
Payer: MEDICARE

## 2023-07-03 VITALS
WEIGHT: 280 LBS | HEIGHT: 68 IN | DIASTOLIC BLOOD PRESSURE: 73 MMHG | BODY MASS INDEX: 42.44 KG/M2 | SYSTOLIC BLOOD PRESSURE: 105 MMHG | HEART RATE: 69 BPM | RESPIRATION RATE: 16 BRPM

## 2023-07-03 PROCEDURE — 95909 NRV CNDJ TST 5-6 STUDIES: CPT

## 2023-07-03 NOTE — PROCEDURE
[de-identified] : PRE-PROCEDURE\par \par * Was H&P completed and in chart at time of procedure ?  YES\par * Were the indications for the procedure appropriate ?  YES\par * Were the practitioner's entries in the patient's medical record appropriate ?  YES\par \par PROCEDURE\par * Did the practitioner maintain proper sterile technique ?  YES\par * If bleeding was encountered, did the practitioner manage it appropriately?  YES\par * Were complications, if any, recognized and managed appropriately?  YES\par * Was the practitioner's use of diagnostic services (e.g., lab, x-ray, MRI, CT) appropriate?  YES\par \par POST-PROCEDURE\par * Did the pre-procedure diagnosis coincide with the findings of the procedure?  YES\par * Was the procedure report complete, accurate and timely?  YES\par

## 2023-07-03 NOTE — ASSESSMENT
[FreeTextEntry1] : NCS B/L UE performed at today's office visit.  Results consistent with (1) mild, residual slowing of median sensory and motor nerve conduction velocities across both wrists; (2) mild, demyelinating, motor, ulnar mononeuropathy across the left elbow.  Full results to follow.

## 2023-07-06 ENCOUNTER — APPOINTMENT (OUTPATIENT)
Dept: ORTHOPEDIC SURGERY | Facility: CLINIC | Age: 68
End: 2023-07-06
Payer: MEDICARE

## 2023-07-06 VITALS — DIASTOLIC BLOOD PRESSURE: 68 MMHG | HEART RATE: 76 BPM | SYSTOLIC BLOOD PRESSURE: 99 MMHG

## 2023-07-06 DIAGNOSIS — M65.30 TRIGGER FINGER, UNSPECIFIED FINGER: ICD-10-CM

## 2023-07-06 DIAGNOSIS — G56.03 CARPAL TUNNEL SYNDROM,BILATERAL UPPER LIMBS: ICD-10-CM

## 2023-07-06 DIAGNOSIS — M79.646 PAIN IN UNSPECIFIED HAND: ICD-10-CM

## 2023-07-06 DIAGNOSIS — M79.643 PAIN IN UNSPECIFIED HAND: ICD-10-CM

## 2023-07-06 PROCEDURE — 99214 OFFICE O/P EST MOD 30 MIN: CPT | Mod: 25

## 2023-07-06 PROCEDURE — 20550 NJX 1 TENDON SHEATH/LIGAMENT: CPT | Mod: LT

## 2023-07-06 NOTE — HISTORY OF PRESENT ILLNESS
[FreeTextEntry1] : Harshil returns for follow-up with known history of numbness and tingling bilaterally in both hands.  He also complains of left middle finger pain with episodes of triggering.  He presents with the results of a nerve study.  He has been bracing for carpal tunnels which does help

## 2023-07-06 NOTE — PHYSICAL EXAM
[de-identified] : He is well-appearing on examination\par \par \par Examination of the left and right wrist reveals discomfort with compression at the level of the volar carpal tunnel eliciting numbness/tingling throughout the fingertips [there is weakness with thumb abduction]\par \par Examination of the left hand particularly at the A1 of the middle reveals tenderness with a palpable click. \par \par

## 2023-07-06 NOTE — ASSESSMENT
[FreeTextEntry1] : ASSESSMENT:\par [The patient was accompanied today and was assisted with explaining their complaints today.]\par The patient comes in today with chronic history of bilateral hand numbness and tingling in the form of carpal tunnel disease bilaterally as well as left-sided cubital tunnel symptoms.  We have reviewed the nerve study results which are concordant with the clinical examination and history.  At this point in time bracing has helped tremendously however he does suffer from left middle finger triggering for which he elects for an injection\par \par The patient was adequately and thoroughly informed of my assessment of their current condition(s).  - This may diminish bodily function for the extremity.\par We discussed prognosis, treatment modalities including operative and nonoperative options for the above diagnostic assessment.\par [For this I was able to review other physician’s note(s) including reviewing other tests, imaging results as well as personally view these results for my own interpretation.]\par \par Injection:\par \par The risks and benefits of a steroid injection were discussed in detail. The risks include but are not limited to: pain, infection, swelling, flare response, bleeding, subcutaneous fat atrophy, skin depigmentation and/or elevation of blood sugar. The risk of incomplete resolution of symptoms, recurrence and additional intervention was reviewed and considered by the patient. \par The patient agreed to proceed and under a sterile prep, I injected 1 unit into 2 cc of a combination of Celestone and Lidocaine into the left middle finger A1. The patient tolerated the injection well.\par \par The patient was adequately and thoroughly informed of my assessment of their current condition(s). \par \par DISCUSSION:\par 1.  I am hopeful that the injection for the left middle finger will help relieve his tendinitis.  He will continue bracing for bilateral carpal tunnel disease\par 2.  We will see each other again in 3 months time if not sooner\par \par

## 2023-07-12 ENCOUNTER — APPOINTMENT (OUTPATIENT)
Dept: PHYSICAL MEDICINE AND REHAB | Facility: CLINIC | Age: 68
End: 2023-07-12

## 2023-10-04 ENCOUNTER — APPOINTMENT (OUTPATIENT)
Dept: ORTHOPEDIC SURGERY | Facility: CLINIC | Age: 68
End: 2023-10-04

## 2023-12-27 ENCOUNTER — RX RENEWAL (OUTPATIENT)
Age: 68
End: 2023-12-27

## 2023-12-27 RX ORDER — TAMSULOSIN HYDROCHLORIDE 0.4 MG/1
0.4 CAPSULE ORAL
Qty: 60 | Refills: 3 | Status: ACTIVE | COMMUNITY
Start: 2021-10-12 | End: 1900-01-01

## 2024-01-04 NOTE — DISCHARGE NOTE PROVIDER - NSDCHHNEEDSERVICE_GEN_ALL_CORE
Detail Level: Detailed High Dose Vitamin A Pregnancy And Lactation Text: High dose vitamin A therapy is contraindicated during pregnancy and breast feeding. Aklief counseling:  Patient advised to apply a pea-sized amount only at bedtime and wait 30 minutes after washing their face before applying.  If too drying, patient may add a non-comedogenic moisturizer.  The most commonly reported side effects including irritation, redness, scaling, dryness, stinging, burning, itching, and increased risk of sunburn.  The patient verbalized understanding of the proper use and possible adverse effects of retinoids.  All of the patient's questions and concerns were addressed. Wound care and assessment/Other, specify... Minocycline Pregnancy And Lactation Text: This medication is Pregnancy Category D and not consider safe during pregnancy. It is also excreted in breast milk. Azelaic Acid Counseling: Patient counseled that medicine may cause skin irritation and to avoid applying near the eyes.  In the event of skin irritation, the patient was advised to reduce the amount of the drug applied or use it less frequently.   The patient verbalized understanding of the proper use and possible adverse effects of azelaic acid.  All of the patient's questions and concerns were addressed. Topical Clindamycin Pregnancy And Lactation Text: This medication is Pregnancy Category B and is considered safe during pregnancy. It is unknown if it is excreted in breast milk. Benzoyl Peroxide Counseling: Patient counseled that medicine may cause skin irritation and bleach clothing.  In the event of skin irritation, the patient was advised to reduce the amount of the drug applied or use it less frequently.   The patient verbalized understanding of the proper use and possible adverse effects of benzoyl peroxide.  All of the patient's questions and concerns were addressed. Azithromycin Pregnancy And Lactation Text: This medication is considered safe during pregnancy and is also secreted in breast milk. Doxycycline Counseling:  Patient counseled regarding possible photosensitivity and increased risk for sunburn.  Patient instructed to avoid sunlight, if possible.  When exposed to sunlight, patients should wear protective clothing, sunglasses, and sunscreen.  The patient was instructed to call the office immediately if the following severe adverse effects occur:  hearing changes, easy bruising/bleeding, severe headache, or vision changes.  The patient verbalized understanding of the proper use and possible adverse effects of doxycycline.  All of the patient's questions and concerns were addressed. Erythromycin Counseling:  I discussed with the patient the risks of erythromycin including but not limited to GI upset, allergic reaction, drug rash, diarrhea, increase in liver enzymes, and yeast infections. Topical Sulfur Applications Pregnancy And Lactation Text: This medication is Pregnancy Category C and has an unknown safety profile during pregnancy. It is unknown if this topical medication is excreted in breast milk. Spironolactone Counseling: Patient advised regarding risks of diarrhea, abdominal pain, hyperkalemia, birth defects (for female patients), liver toxicity and renal toxicity. The patient may need blood work to monitor liver and kidney function and potassium levels while on therapy. The patient verbalized understanding of the proper use and possible adverse effects of spironolactone.  All of the patient's questions and concerns were addressed. Isotretinoin Pregnancy And Lactation Text: This medication is Pregnancy Category X and is considered extremely dangerous during pregnancy. It is unknown if it is excreted in breast milk. Tetracycline Counseling: Patient counseled regarding possible photosensitivity and increased risk for sunburn.  Patient instructed to avoid sunlight, if possible.  When exposed to sunlight, patients should wear protective clothing, sunglasses, and sunscreen.  The patient was instructed to call the office immediately if the following severe adverse effects occur:  hearing changes, easy bruising/bleeding, severe headache, or vision changes.  The patient verbalized understanding of the proper use and possible adverse effects of tetracycline.  All of the patient's questions and concerns were addressed. Patient understands to avoid pregnancy while on therapy due to potential birth defects. Birth Control Pills Counseling: Birth Control Pill Counseling: I discussed with the patient the potential side effects of OCPs including but not limited to increased risk of stroke, heart attack, thrombophlebitis, deep venous thrombosis, hepatic adenomas, breast changes, GI upset, headaches, and depression.  The patient verbalized understanding of the proper use and possible adverse effects of OCPs. All of the patient's questions and concerns were addressed. Erythromycin Pregnancy And Lactation Text: This medication is Pregnancy Category B and is considered safe during pregnancy. It is also excreted in breast milk. Topical Retinoid Pregnancy And Lactation Text: This medication is Pregnancy Category C. It is unknown if this medication is excreted in breast milk. Detail Level: Zone Dapsone Counseling: I discussed with the patient the risks of dapsone including but not limited to hemolytic anemia, agranulocytosis, rashes, methemoglobinemia, kidney failure, peripheral neuropathy, headaches, GI upset, and liver toxicity.  Patients who start dapsone require monitoring including baseline LFTs and weekly CBCs for the first month, then every month thereafter.  The patient verbalized understanding of the proper use and possible adverse effects of dapsone.  All of the patient's questions and concerns were addressed. Tazorac Pregnancy And Lactation Text: This medication is not safe during pregnancy. It is unknown if this medication is excreted in breast milk. Minocycline Counseling: Patient advised regarding possible photosensitivity and discoloration of the teeth, skin, lips, tongue and gums.  Patient instructed to avoid sunlight, if possible.  When exposed to sunlight, patients should wear protective clothing, sunglasses, and sunscreen.  The patient was instructed to call the office immediately if the following severe adverse effects occur:  hearing changes, easy bruising/bleeding, severe headache, or vision changes.  The patient verbalized understanding of the proper use and possible adverse effects of minocycline.  All of the patient's questions and concerns were addressed. Aklief Pregnancy And Lactation Text: It is unknown if this medication is safe to use during pregnancy.  It is unknown if this medication is excreted in breast milk.  Breastfeeding women should use the topical cream on the smallest area of the skin for the shortest time needed while breastfeeding.  Do not apply to nipple and areola. Dapsone Pregnancy And Lactation Text: This medication is Pregnancy Category C and is not considered safe during pregnancy or breast feeding. Topical Clindamycin Counseling: Patient counseled that this medication may cause skin irritation or allergic reactions.  In the event of skin irritation, the patient was advised to reduce the amount of the drug applied or use it less frequently.   The patient verbalized understanding of the proper use and possible adverse effects of clindamycin.  All of the patient's questions and concerns were addressed. Azelaic Acid Pregnancy And Lactation Text: This medication is considered safe during pregnancy and breast feeding. Azithromycin Counseling:  I discussed with the patient the risks of azithromycin including but not limited to GI upset, allergic reaction, drug rash, diarrhea, and yeast infections. Doxycycline Pregnancy And Lactation Text: This medication is Pregnancy Category D and not consider safe during pregnancy. It is also excreted in breast milk but is considered safe for shorter treatment courses. Topical Sulfur Applications Counseling: Topical Sulfur Counseling: Patient counseled that this medication may cause skin irritation or allergic reactions.  In the event of skin irritation, the patient was advised to reduce the amount of the drug applied or use it less frequently.   The patient verbalized understanding of the proper use and possible adverse effects of topical sulfur application.  All of the patient's questions and concerns were addressed. Bactrim Counseling:  I discussed with the patient the risks of sulfa antibiotics including but not limited to GI upset, allergic reaction, drug rash, diarrhea, dizziness, photosensitivity, and yeast infections.  Rarely, more serious reactions can occur including but not limited to aplastic anemia, agranulocytosis, methemoglobinemia, blood dyscrasias, liver or kidney failure, lung infiltrates or desquamative/blistering drug rashes. Winlevi Counseling:  I discussed with the patient the risks of topical clascoterone including but not limited to erythema, scaling, itching, and stinging. Patient voiced their understanding. Benzoyl Peroxide Pregnancy And Lactation Text: This medication is Pregnancy Category C. It is unknown if benzoyl peroxide is excreted in breast milk. Sarecycline Counseling: Patient advised regarding possible photosensitivity and discoloration of the teeth, skin, lips, tongue and gums.  Patient instructed to avoid sunlight, if possible.  When exposed to sunlight, patients should wear protective clothing, sunglasses, and sunscreen.  The patient was instructed to call the office immediately if the following severe adverse effects occur:  hearing changes, easy bruising/bleeding, severe headache, or vision changes.  The patient verbalized understanding of the proper use and possible adverse effects of sarecycline.  All of the patient's questions and concerns were addressed. Use Enhanced Medication Counseling?: No Isotretinoin Counseling: Patient should get monthly blood tests, not donate blood, not drive at night if vision affected, not share medication, and not undergo elective surgery for 6 months after tx completed. Side effects reviewed, pt to contact office should one occur. Spironolactone Pregnancy And Lactation Text: This medication can cause feminization of the male fetus and should be avoided during pregnancy. The active metabolite is also found in breast milk. Bactrim Pregnancy And Lactation Text: This medication is Pregnancy Category D and is known to cause fetal risk.  It is also excreted in breast milk. Winlevi Pregnancy And Lactation Text: This medication is considered safe during pregnancy and breastfeeding. Topical Retinoid counseling:  Patient advised to apply a pea-sized amount only at bedtime and wait 30 minutes after washing their face before applying.  If too drying, patient may add a non-comedogenic moisturizer. The patient verbalized understanding of the proper use and possible adverse effects of retinoids.  All of the patient's questions and concerns were addressed. Tazorac Counseling:  Patient advised that medication is irritating and drying.  Patient may need to apply sparingly and wash off after an hour before eventually leaving it on overnight.  The patient verbalized understanding of the proper use and possible adverse effects of tazorac.  All of the patient's questions and concerns were addressed. Birth Control Pills Pregnancy And Lactation Text: This medication should be avoided if pregnant and for the first 30 days post-partum. High Dose Vitamin A Counseling: Side effects reviewed, pt to contact office should one occur.

## 2024-01-19 ENCOUNTER — APPOINTMENT (OUTPATIENT)
Dept: UROLOGY | Facility: CLINIC | Age: 69
End: 2024-01-19

## 2024-01-31 ENCOUNTER — OUTPATIENT (OUTPATIENT)
Dept: OUTPATIENT SERVICES | Facility: HOSPITAL | Age: 69
LOS: 1 days | End: 2024-01-31
Payer: MEDICARE

## 2024-01-31 ENCOUNTER — APPOINTMENT (OUTPATIENT)
Dept: ULTRASOUND IMAGING | Facility: CLINIC | Age: 69
End: 2024-01-31
Payer: MEDICARE

## 2024-01-31 ENCOUNTER — APPOINTMENT (OUTPATIENT)
Dept: RADIOLOGY | Facility: CLINIC | Age: 69
End: 2024-01-31
Payer: MEDICARE

## 2024-01-31 DIAGNOSIS — Z90.49 ACQUIRED ABSENCE OF OTHER SPECIFIED PARTS OF DIGESTIVE TRACT: Chronic | ICD-10-CM

## 2024-01-31 DIAGNOSIS — Z90.79 ACQUIRED ABSENCE OF OTHER GENITAL ORGAN(S): Chronic | ICD-10-CM

## 2024-01-31 DIAGNOSIS — Z00.8 ENCOUNTER FOR OTHER GENERAL EXAMINATION: ICD-10-CM

## 2024-01-31 PROCEDURE — 76775 US EXAM ABDO BACK WALL LIM: CPT

## 2024-01-31 PROCEDURE — 76775 US EXAM ABDO BACK WALL LIM: CPT | Mod: 26

## 2024-01-31 PROCEDURE — 73562 X-RAY EXAM OF KNEE 3: CPT | Mod: 26,RT

## 2024-01-31 PROCEDURE — 73562 X-RAY EXAM OF KNEE 3: CPT

## 2024-02-07 ENCOUNTER — APPOINTMENT (OUTPATIENT)
Dept: CT IMAGING | Facility: CLINIC | Age: 69
End: 2024-02-07
Payer: MEDICARE

## 2024-02-07 ENCOUNTER — OUTPATIENT (OUTPATIENT)
Dept: OUTPATIENT SERVICES | Facility: HOSPITAL | Age: 69
LOS: 1 days | End: 2024-02-07
Payer: MEDICARE

## 2024-02-07 ENCOUNTER — TRANSCRIPTION ENCOUNTER (OUTPATIENT)
Age: 69
End: 2024-02-07

## 2024-02-07 DIAGNOSIS — Z90.49 ACQUIRED ABSENCE OF OTHER SPECIFIED PARTS OF DIGESTIVE TRACT: Chronic | ICD-10-CM

## 2024-02-07 DIAGNOSIS — Z90.79 ACQUIRED ABSENCE OF OTHER GENITAL ORGAN(S): Chronic | ICD-10-CM

## 2024-02-07 DIAGNOSIS — Z00.8 ENCOUNTER FOR OTHER GENERAL EXAMINATION: ICD-10-CM

## 2024-02-07 PROCEDURE — 74176 CT ABD & PELVIS W/O CONTRAST: CPT | Mod: 26,MH

## 2024-02-07 PROCEDURE — 74176 CT ABD & PELVIS W/O CONTRAST: CPT | Mod: MH

## 2024-02-13 ENCOUNTER — APPOINTMENT (OUTPATIENT)
Dept: ORTHOPEDIC SURGERY | Facility: CLINIC | Age: 69
End: 2024-02-13

## 2024-02-19 DIAGNOSIS — M25.561 PAIN IN RIGHT KNEE: ICD-10-CM

## 2024-02-20 ENCOUNTER — APPOINTMENT (OUTPATIENT)
Dept: ORTHOPEDIC SURGERY | Facility: CLINIC | Age: 69
End: 2024-02-20
Payer: MEDICARE

## 2024-02-20 VITALS
HEIGHT: 68 IN | SYSTOLIC BLOOD PRESSURE: 108 MMHG | WEIGHT: 280 LBS | DIASTOLIC BLOOD PRESSURE: 74 MMHG | HEART RATE: 74 BPM | BODY MASS INDEX: 42.44 KG/M2

## 2024-02-20 PROCEDURE — 99214 OFFICE O/P EST MOD 30 MIN: CPT | Mod: 25

## 2024-02-20 PROCEDURE — 73564 X-RAY EXAM KNEE 4 OR MORE: CPT | Mod: RT

## 2024-02-20 PROCEDURE — 20610 DRAIN/INJ JOINT/BURSA W/O US: CPT | Mod: RT

## 2024-02-20 RX ORDER — HYALURONATE SODIUM 30 MG/2 ML
30 SYRINGE (ML) INTRAARTICULAR
Qty: 3 | Refills: 0 | Status: ACTIVE | OUTPATIENT
Start: 2024-02-20

## 2024-02-20 RX ORDER — MELOXICAM 15 MG/1
15 TABLET ORAL
Qty: 30 | Refills: 1 | Status: ACTIVE | COMMUNITY
Start: 2024-02-20 | End: 1900-01-01

## 2024-02-20 NOTE — REASON FOR VISIT
[Other: ____] : [unfilled] [Spouse] : spouse [_______] : taryn BOWER  for [FreeTextEntry2] : Lot #: [id number] | EXP: 02/16/2026

## 2024-02-20 NOTE — HISTORY OF PRESENT ILLNESS
[Pain Location] : pain [] : right knee [Worsening] : worsening [Standing] : standing [Constant] : ~He/She~ states the symptoms seem to be constant [Sitting] : worsened by sitting [Walking] : worsened by walking [Running] : worsened by running [Knee Flexion] : worsened with knee flexion [Knee Extension] : worsened with knee extension [___ mths] : [unfilled] month(s) ago [de-identified] : 68 year old male presents today for an initial consultation for his constant right knee pain for the past month. Patient states his pain is getting worse and is now decreasing motility. He is currently using a cane for ambulation due to his back pain. Straightening the right knee and going up and down stairs are most exacerbating factors, however, he also has pain from walking, sitting, running and flexion. Patient states he has used OTC medications such as Tylenol and Ibuprofen, to no relief. The patient would like to discuss possible Viscosupplementation in the right knee for the pain today.  He is try cortisone injections the past and not had any relief.  Has had good response to viscosupplementation in the past [Acetaminophen] : not relieved by acetaminophen [NSAIDs] : not relieved by nonsteroidal anti-inflammatory drugs [de-identified] : going up and down the stairs, rising from a seated position

## 2024-02-20 NOTE — PROCEDURE
[Injection] : Injection [Right] : of the right [Knee Joint] : knee joint [Osteoarthritis] : Osteoarthritis [Joint Pain] : Joint pain [Patient] : patient [Verbal Consent Obtained] : verbal consent was obtained prior to the procedure [Alcohol] : Alcohol [Ethyl Chloride Spray] : ethyl chloride spray was used as a topical anesthetic [Lateral] : lateral [22] : a 22-gauge [Bandage Applied] : a bandage [Tolerated Well] : The patient tolerated the procedure well [None] : none [No Strenuous Activity___day(s)] : avoid strenuous activity for [unfilled] day(s) [___ Week(s)] : in [unfilled] week(s) [de-identified] : Orthovisc # 1 Right Knee  Discussed at length with the patient the planned Orthovisc injection. The risks, benefits, convalescence and alternatives were reviewed. The possible side effects discussed included but were not limited to: pain, swelling, heat and redness. There symptoms are generally mild but if they are extensive then contact the office. Giving pain relievers by mouth such as NSAIDs or Tylenol can generally treat the reactions to Orthovisc. Rare cases of infection have been noted. Rash, hives and itching may occur post injection. If you have muscle pain or cramps, flushing and or swelling of the face, rapid heart beat, nausea, dizziness, fever, chills, headache, difficulty breathing, swelling in the arms or legs, or have a prickly feeling of your skin, contact a health care provider immediately.  Following this discussion, the knee was prepped with Betadine and under sterile condition the Orthovisc injection was performed with a 22 gauge needle. The needle was introduced into the joint, aspiration was performed to ensure intra-articular placement and the medication was injected. Upon withdrawal of the needle the site was cleaned with alcohol and a Band-Aid applied. The patient tolerated the injection well and there were no adverse effects. Post injection instructions included no strenuous activity for 24 hours, cryotherapy and if there are any adverse effects to contact the office. [FreeTextEntry8] : 2 mL Orthovisc (Lot #: [id number] | EXP: 02/16/2026)

## 2024-02-20 NOTE — ADDENDUM
[FreeTextEntry1] : This note was written by Milagro Wilson, acting as the  for Dr. Jackson. This note accurately reflects the work and decisions made by Dr. Jackson.

## 2024-02-20 NOTE — DISCUSSION/SUMMARY
[Medication Risks Reviewed] : Medication risks reviewed [1 Week] : in 1 week [de-identified] : Patient is a 68-year-old male with severe right knee osteoarthritis presenting today for initial evaluation.  He is not yet exhausted conservative treatment I think that is warranted at this time.  He has had no response to cortisone injections in the past would like to defer that.  He would like to try viscosupplementation as he had good response in the past.  I gave him injection of Orthovisc in the right knee which he tolerated well.  We discussed low impact activity and exercise.  I recommended physical therapy.  I given a prescription for meloxicam 15 mg daily as needed for pain.  I will see him back in 1 week for repeat injection.  All questions were asked and answered.  He was advised may be candidate for total knee arthroplasty in the future

## 2024-02-20 NOTE — PHYSICAL EXAM
[Cane] : ambulates with cane [de-identified] : GENERAL APPEARANCE: Well nourished and hydrated, pleasant, alert, and oriented x 3. Appears their stated age.  HEENT: Normocephalic, extraocular eye motion intact. Nasal septum midline. Oral cavity clear. External auditory canal clear.  RESPIRATORY: Breath sounds clear and audible in all lobes. No wheezing, No accessory muscle use. CARDIOVASCULAR: No apparent abnormalities. No lower leg edema. No varicosities. Pedal pulses are palpable. NEUROLOGIC: Sensation is normal, no muscle weakness in the upper or lower extremities. DERMATOLOGIC: No apparent skin lesions, moist, warm, no rash. SPINE: Cervical spine appears normal and moves freely; thoracic spine appears normal and moves freely; lumbosacral spine appears normal and moves freely, normal, nontender. MUSCULOSKELETAL: Hands, wrists, and elbows are normal and move freely, shoulders are normal and move freely.  PSYCHIATRIC: Oriented to person, place, and time, insight and judgement were intact and the affect was normal. [de-identified] : Right knee exam shows mild effusion, ROM is 0-120 degrees, no instability, negative Lety, no joint line tenderness.  5/5 motor strength in bilateral lower extremities. Sensory: Intact in bilateral lower extremities. DTRs: Biceps, brachioradialis, triceps, patellar, ankle and plantar 2+ and symmetric bilaterally. Pulses: dorsalis pedis, posterior tibial, femoral, popliteal, and radial 2+ and symmetric bilaterally. [de-identified] : 01/31/2024 - [facility]: 3 Views AP, Lateral, and Oblique X-Ray of the Right Knee - IMPRESSION: * No fracture, dislocation, or joint effusion. * Narrowed medial tibiofemoral joint space. Preserved remaining joint spaces and no joint margin erosions or intra-articular or periarticular calcifications. * Superior patellar enthesophyte. * No destructive osseous lesions or periosteal reaction. * Vascular calcifications.  02/07/2024 - [facility]: CT of the Abdomen and Pelvis with Oral Contrast - IMPRESSION: * No acute intra-abdominal pathology visualized.  4 views of the right knee obtained the office today show no acute fracture or dislocation.  There is severe medial joint space narrowing bone-on-bone osteoarthritis tricompartmental degenerative changes consistent Kellgren-Saturnino grade 4 changes

## 2024-02-29 ENCOUNTER — APPOINTMENT (OUTPATIENT)
Dept: ORTHOPEDIC SURGERY | Facility: CLINIC | Age: 69
End: 2024-02-29
Payer: MEDICARE

## 2024-02-29 PROCEDURE — 20610 DRAIN/INJ JOINT/BURSA W/O US: CPT | Mod: RT

## 2024-02-29 NOTE — HISTORY OF PRESENT ILLNESS
[Pain Location] : pain [] : right knee [Worsening] : worsening [___ mths] : [unfilled] month(s) ago [Standing] : standing [Constant] : ~He/She~ states the symptoms seem to be constant [Sitting] : worsened by sitting [Running] : worsened by running [Walking] : worsened by walking [Knee Flexion] : worsened with knee flexion [Knee Extension] : worsened with knee extension [de-identified] : 68 year old male presents today for follow-up of his constant right knee pain and to receive his 2nd of 3 Orthovisc Gel injections in the right knee. [Acetaminophen] : not relieved by acetaminophen [NSAIDs] : not relieved by nonsteroidal anti-inflammatory drugs [de-identified] : going up and down the stairs, rising from a seated position

## 2024-02-29 NOTE — DISCUSSION/SUMMARY
[Medication Risks Reviewed] : Medication risks reviewed [1 Week] : in 1 week [de-identified] : Patient is a 68-year-old male with severe right knee osteoarthritis presenting today status post his 2nd of 3 Orthovisc Gel Injections in the right knee. The patient tolerated the procedure well. I will see him back in 1 week for repeat injection.  All questions were asked and answered. The patient verbalized understanding the plan.

## 2024-02-29 NOTE — REASON FOR VISIT
[Other: ____] : [unfilled] [Spouse] : spouse [_______] : taryn BOWER  for [FreeTextEntry2] : Lot #: [id number] | EXP: 02/16/2026

## 2024-02-29 NOTE — PROCEDURE
[Injection] : Injection [Right] : of the right [Osteoarthritis] : Osteoarthritis [Knee Joint] : knee joint [Joint Pain] : Joint pain [Patient] : patient [Verbal Consent Obtained] : verbal consent was obtained prior to the procedure [Alcohol] : Alcohol [Ethyl Chloride Spray] : ethyl chloride spray was used as a topical anesthetic [Lateral] : lateral [22] : a 22-gauge [Bandage Applied] : a bandage [Tolerated Well] : The patient tolerated the procedure well [No Strenuous Activity___day(s)] : avoid strenuous activity for [unfilled] day(s) [None] : none [___ Week(s)] : in [unfilled] week(s) [FreeTextEntry8] : 2 mL Orthovisc (Lot #: [id number] | EXP: 02/16/2026) [de-identified] : Orthovisc # 2 Right Knee  Discussed at length with the patient the planned Orthovisc injection. The risks, benefits, convalescence and alternatives were reviewed. The possible side effects discussed included but were not limited to: pain, swelling, heat and redness. There symptoms are generally mild but if they are extensive then contact the office. Giving pain relievers by mouth such as NSAIDs or Tylenol can generally treat the reactions to Orthovisc. Rare cases of infection have been noted. Rash, hives and itching may occur post injection. If you have muscle pain or cramps, flushing and or swelling of the face, rapid heart beat, nausea, dizziness, fever, chills, headache, difficulty breathing, swelling in the arms or legs, or have a prickly feeling of your skin, contact a health care provider immediately.  Following this discussion, the knee was prepped with Betadine and under sterile condition the Orthovisc injection was performed with a 22 gauge needle. The needle was introduced into the joint, aspiration was performed to ensure intra-articular placement and the medication was injected. Upon withdrawal of the needle the site was cleaned with alcohol and a Band-Aid applied. The patient tolerated the injection well and there were no adverse effects. Post injection instructions included no strenuous activity for 24 hours, cryotherapy and if there are any adverse effects to contact the office.

## 2024-03-01 ENCOUNTER — APPOINTMENT (OUTPATIENT)
Dept: ORTHOPEDIC SURGERY | Facility: CLINIC | Age: 69
End: 2024-03-01
Payer: MEDICARE

## 2024-03-01 VITALS
DIASTOLIC BLOOD PRESSURE: 84 MMHG | WEIGHT: 265 LBS | SYSTOLIC BLOOD PRESSURE: 129 MMHG | HEART RATE: 71 BPM | HEIGHT: 69 IN | BODY MASS INDEX: 39.25 KG/M2

## 2024-03-01 DIAGNOSIS — M47.816 SPONDYLOSIS W/OUT MYELOPATHY OR RADICULOPATHY, LUMBAR REGION: ICD-10-CM

## 2024-03-01 PROCEDURE — 72100 X-RAY EXAM L-S SPINE 2/3 VWS: CPT

## 2024-03-01 PROCEDURE — 99214 OFFICE O/P EST MOD 30 MIN: CPT

## 2024-03-01 NOTE — DISCUSSION/SUMMARY
[Medication Risks Reviewed] : Medication risks reviewed [de-identified] : 68 year old male presents with symptoms suggestive of lumbar spondylosis with neurogenic claudication. 30 minutes was spent reviewing the x-rays/MRI as well as discussing with the patient their clinical presentation, diagnosis and providing education. Conservative treatment was discussed with the patient at length. Anticipatory guidance regarding disease process, avoidance of acute exacerbation this was discussed at length and all patients commenting concerns were answered to the patient's satisfaction. At this time a repeat MRI of the lumbar spine is ordered and medically necessary given progressively worsening neurogenic claudication type symptoms.  At this time the patient experiencing his symptoms immediately upon standing up.  This is making it difficult for him to perform activities such as physical therapy/home exercising.  Like to evaluate for worsening spinal stenosis necessitating possible early advanced treatment option such as injection therapy versus surgical options.  The patient will follow-up 1 week after MRI is obtained to discuss treatment options.  In the interim he may do activities as tolerated.  Intermittent use of Tylenol 500 mg 2 tablets 3 times daily as needed and ibuprofen 800 mg 3 times daily as needed with meals if there are no medical contraindications.  Heat, ice, topical modalities as well as lidocaine patch and can be used as well. All questions and concerns were addressed with the patient and they are in agreement with this plan.  This note was generated using dragon medical dictation software. A reasonable effort has been made for proofreading its contents, but typos may still remain. If there are any questions or points of clarification needed please notify my office.

## 2024-03-01 NOTE — HISTORY OF PRESENT ILLNESS
[de-identified] : 68-year-old male present today accompanied by family member for evaluation of chronic lower back pain.  Patient states he has been dealing with this pain for several years.  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  The patient reports that he has a known history of lumbar spondylosis, degenerative disc disease, neurogenic claudication with spinal stenosis for years.  He has been unable to treat this with physical therapy/home exercising given significantly progressive symptoms.  He states that soon as he stands up he begins to feel pain radiating down the legs.  Pain is worse along the posterior gluteal region, posterior thigh, lateral anterior thigh and down into the legs.  He also reports numbness on the plantar aspect of the feet bilaterally.  No history of diabetes.  The patient states he is unable to walk for any significant distance secondary to progressively worsening leg and back pain.  He has a positive shopping cart sign and needs to rest frequently whenever doing activities.  He has obtained a cane secondary to the need to forward flex while ambulating and states this provides a little bit of support.  No balance issues.  No bowel / bladder incontinence. No saddle anesthesia.  LIANNA questionnaire is negative. There is no ataxia or other abnormal gait. Patient is not falling more than usual and does not have any decrease in dexterity.

## 2024-03-01 NOTE — PHYSICAL EXAM
[de-identified] : CONSTITUTIONAL: The patient is a very pleasant individual who is well-nourished and who appears stated age. PSYCHIATRIC: The patient is alert and oriented X 3 and in no apparent distress, and participates with orthopedic evaluation well. HEAD: Atraumatic and is nonsyndromic in appearance. EENT: No visible thyromegaly, EOMI. RESPIRATORY: Respiratory rate is regular, not dyspneic on examination. LYMPHATICS: There is no inguinal lymphadenopathy INTEGUMENTARY: Skin is clean, dry, and intact about the bilateral lower extremities and lumbar spine. VASCULAR: There is brisk capillary refill about the bilateral lower extremities. NEUROLOGIC: There are no pathologic reflexes. There is no decrease in sensation of the bilateral lower extremities on Wartenberg pinwheel examination. Deep tendon reflexes are well maintained at 2+/4 of the bilateral lower extremities and are symmetric. MUSCULOSKELETAL: There is no visible muscular atrophy. Manual motor strength is well maintained in the bilateral lower extremities. Range of motion of lumbar spine is well maintained. The patient ambulates in a non-myelopathic manner, ambulating with a forward flexed posture utilizing a cane. Negative tension sign and straight leg raise bilaterally. Quad extension, ankle dorsiflexion, EHL, plantar flexion, and ankle eversion are well preserved. Normal secondary orthopaedic exam of bilateral hips, greater trochanteric area, knees and ankles.  Mechanically oriented lower back pain.  Neurogenic claudication symptoms down bilateral legs immediately upon standing.  Positive tenderness to palpation along lower lumbar paraspinal muscles.  [de-identified] : X-rays of the lumbar spine obtained 2 views on 3/1/2024 and reviewed demonstrating evidence of lumbar spondylosis, spondylolisthesis at L4-L5.  Facet arthropathy noted at L4-5.  MRI of the lumbar spine obtained on lumbar spondylosis with moderate to severe spinal stenosis at L3-L4, L4-L5, L5-S1.

## 2024-03-06 ENCOUNTER — APPOINTMENT (OUTPATIENT)
Dept: ORTHOPEDIC SURGERY | Facility: CLINIC | Age: 69
End: 2024-03-06
Payer: MEDICARE

## 2024-03-06 DIAGNOSIS — M17.11 UNILATERAL PRIMARY OSTEOARTHRITIS, RIGHT KNEE: ICD-10-CM

## 2024-03-06 PROCEDURE — 20610 DRAIN/INJ JOINT/BURSA W/O US: CPT | Mod: RT

## 2024-03-06 NOTE — DISCUSSION/SUMMARY
[Medication Risks Reviewed] : Medication risks reviewed [Other: ____] : in [unfilled] [de-identified] : Patient is a 68-year-old male with severe right knee osteoarthritis presenting today status post his 3rd of 3 Orthovisc Gel Injections in the right knee. The patient tolerated the procedure well. I will see him back in 3 months for repeat evaluation.  All questions were asked and answered. The patient verbalized understanding the plan.

## 2024-03-06 NOTE — REASON FOR VISIT
[_______] : taryn BOWER  for [Other: ____] : [unfilled] [FreeTextEntry2] : Lot #: [id number] | EXP: 02/16/2026

## 2024-03-06 NOTE — HISTORY OF PRESENT ILLNESS
[Pain Location] : pain [] : right knee [Worsening] : worsening [___ mths] : [unfilled] month(s) ago [Standing] : standing [Constant] : ~He/She~ states the symptoms seem to be constant [Sitting] : worsened by sitting [Running] : worsened by running [Walking] : worsened by walking [Knee Flexion] : worsened with knee flexion [Knee Extension] : worsened with knee extension [de-identified] : 68 year old male presents today for follow-up of his constant right knee pain and to receive his 3rd of 3 Orthovisc Gel injections in the right knee. [Acetaminophen] : not relieved by acetaminophen [NSAIDs] : not relieved by nonsteroidal anti-inflammatory drugs [de-identified] : going up and down the stairs, rising from a seated position

## 2024-03-06 NOTE — PROCEDURE
[Injection] : Injection [Right] : of the right [Knee Joint] : knee joint [Osteoarthritis] : Osteoarthritis [Joint Pain] : Joint pain [Patient] : patient [Alcohol] : Alcohol [Verbal Consent Obtained] : verbal consent was obtained prior to the procedure [Ethyl Chloride Spray] : ethyl chloride spray was used as a topical anesthetic [22] : a 22-gauge [Lateral] : lateral [Bandage Applied] : a bandage [Tolerated Well] : The patient tolerated the procedure well [None] : none [No Strenuous Activity___day(s)] : avoid strenuous activity for [unfilled] day(s) [___ Month(s)] : in [unfilled] month(s) [de-identified] : Orthovisc # 3 Right Knee  Discussed at length with the patient the planned Orthovisc injection. The risks, benefits, convalescence and alternatives were reviewed. The possible side effects discussed included but were not limited to: pain, swelling, heat and redness. There symptoms are generally mild but if they are extensive then contact the office. Giving pain relievers by mouth such as NSAIDs or Tylenol can generally treat the reactions to Orthovisc. Rare cases of infection have been noted. Rash, hives and itching may occur post injection. If you have muscle pain or cramps, flushing and or swelling of the face, rapid heart beat, nausea, dizziness, fever, chills, headache, difficulty breathing, swelling in the arms or legs, or have a prickly feeling of your skin, contact a health care provider immediately.  Following this discussion, the knee was prepped with Betadine and under sterile condition the Orthovisc injection was performed with a 22 gauge needle. The needle was introduced into the joint, aspiration was performed to ensure intra-articular placement and the medication was injected. Upon withdrawal of the needle the site was cleaned with alcohol and a Band-Aid applied. The patient tolerated the injection well and there were no adverse effects. Post injection instructions included no strenuous activity for 24 hours, cryotherapy and if there are any adverse effects to contact the office. [FreeTextEntry8] : 2 mL Orthovisc (Lot #: [id number] | EXP: 02/16/2026)

## 2024-03-08 ENCOUNTER — APPOINTMENT (OUTPATIENT)
Dept: UROLOGY | Facility: CLINIC | Age: 69
End: 2024-03-08
Payer: MEDICARE

## 2024-03-08 DIAGNOSIS — N20.0 CALCULUS OF KIDNEY: ICD-10-CM

## 2024-03-08 PROCEDURE — 99213 OFFICE O/P EST LOW 20 MIN: CPT

## 2024-03-16 PROBLEM — N20.0 NEPHROLITHIASIS: Status: ACTIVE | Noted: 2022-06-03

## 2024-03-16 NOTE — HISTORY OF PRESENT ILLNESS
[FreeTextEntry1] : 69 yo M PSA tested this year with PCP and was normal previous history of nephrolithiasis and complicated scrotal abscess  feeling well reviewed new ultrasound 1/16/2023:  - no stones, no hydronephrosis,  - 3.6 cm right simple cyst, left scattered simple cysts the largest 1.7 cm  plan: - continue follow up with PCP - ultrasound in 1 year with follow up

## 2024-03-26 ENCOUNTER — APPOINTMENT (OUTPATIENT)
Dept: ORTHOPEDIC SURGERY | Facility: CLINIC | Age: 69
End: 2024-03-26
Payer: MEDICARE

## 2024-03-26 VITALS
DIASTOLIC BLOOD PRESSURE: 83 MMHG | HEART RATE: 87 BPM | WEIGHT: 265 LBS | BODY MASS INDEX: 39.25 KG/M2 | SYSTOLIC BLOOD PRESSURE: 129 MMHG | HEIGHT: 69 IN

## 2024-03-26 PROCEDURE — 99215 OFFICE O/P EST HI 40 MIN: CPT

## 2024-03-26 NOTE — DISCUSSION/SUMMARY
[de-identified] : Very pleasant surgical conversation was conducted with this gentleman with regard to an instrumented spinal fusion definite L4-5 with interbody in order to address the lumbar spondylolisthesis severe spinal stenosis.  Lumbar laminectomy will span from the caudal aspect of L2-L3-L4 and L5.  Patient states he is progressively getting worse thankfully there is no acute change in bowel or bladder habits patient wishes to exhaust all aspects of conservative care we will going to enroll the gentleman in physical therapy we will going to enroll the gentleman into he wishes to try an epidural injection with physical medicine and rehabilitation patient is can a follow-up in approximately 2 to 3 months for repeat clinical assessment and guidance of treatment.  Patient wishes to schedule surgery and possibly the summertime of 2024.  A long discussion was had with the patient regarding Lumbar surgical plan of L4-5 instrumented spinal fusion interbody osteotomy as well as lumbar laminectomy L3-L4 and D1Sfcuelro models, Xrays, CT scans/MRI's were utilized to provide a firm understanding of their surgical plan. Patient is aware that surgery is elective in nature and he/she choosing to proceed with surgery. Risks, benefits, alternatives were discussed and all questions, comments and concerns were encouraged and answered to the patient's satisfaction. The statistical probability of improvement was discussed at length as well as post surgical course. Literature from [employer] society was provided to the patient regarding the specific type of surgery as well as a 5 page written surgical consent which the patient will need to sign and return to the office prior to surgical date. Consent forms highlight specific complications related to the complex nature of spinal surgery   Risks of lumbar surgery include: persistent pain, adjacent segment disease (which will require more surgery in future), dural tears, neurologic injury, and wound healing complications   Benefits of lumbar surgery include Improved neurologic and pain score   We also discussed with the patient complications of incisions directly related to obesity, diabetes, previous wound complications or post-surgical wound infections, smoking, neuropathy, and chronic anticoagulation. This risk has been specifically discussed and the patient will discuss modifiable risk factors to be optimized prior to surgical management. A multimodality approach of primary care physician, and medicine subspecialist will be utilized to optimize medical risk factors.   If patient is a smoker, discontinuation of smoking was advised and must be accomplished 6-8 weeks prior to surgery date. Patient was advised that help with quitting smoking is available through [facility] [employer] Smoker's Quit Line and phone number/website was provided, or patient can ask assistance from primary care provider. Elective surgery will not be performed unless patient complies with this policy.

## 2024-03-26 NOTE — HISTORY OF PRESENT ILLNESS
[de-identified] : Very pleasant 68-year-old gentleman with a known history of lumbar spondylosis as well as a L4-5 spondylolisthesis presents for definitive management.  Presents for surgical conversation with regard to multilevel lumbar laminectomy secondary to worsening neurogenic claudication.  LIANNA questionnaire is negative he states pain starts within less than 5 minutes patient is forced to ambulate with a cane at this point in time. [Worsening] : worsening [___ yrs] : [unfilled] year(s) ago [1] : a current pain level of 1/10 [Prolonged Standing] : worsened by prolonged standing [10] : a maximum pain level of 10/10 [Walking] : worsened by walking [Standing] : worsened by standing [Rest] : relieved by rest [Weight Bearing] : worsened by weight bearing [Ataxia] : no ataxia [Loss of Dexterity] : good dexterity [Incontinence] : no incontinence [Urinary Ret.] : no urinary retention [de-identified] : Seated [de-identified] : Standing

## 2024-03-26 NOTE — REASON FOR VISIT
[Follow-Up Visit] : a follow-up visit for [Back Pain] : back pain [Family Member] : family member [FreeTextEntry2] : MRI review

## 2024-03-26 NOTE — PHYSICAL EXAM
[Antalgic] : antalgic [Stooped] : stooped [de-identified] : CONSTITUTIONAL: The patient is a very pleasant individual who is well-nourished and who appears stated age. PSYCHIATRIC: The patient is alert and oriented X 3 and in no apparent distress, and participates with orthopedic evaluation well. HEAD: Atraumatic and is nonsyndromic in appearance. EENT: No visible thyromegaly, EOMI. RESPIRATORY: Respiratory rate is regular, not dyspneic on examination. LYMPHATICS: There is no inguinal lymphadenopathy INTEGUMENTARY: Skin is clean, dry, and intact about the bilateral lower extremities and lumbar spine. VASCULAR: There is brisk capillary refill about the bilateral lower extremities. NEUROLOGIC: There are no pathologic reflexes. There is no decrease in sensation of the bilateral lower extremities on manual  examination. Deep tendon reflexes are well maintained at 2+/4 of the bilateral lower extremities and are symmetric.. MUSCULOSKELETAL: There is no visible muscular atrophy. Manual motor strength is well maintained in the bilateral lower extremities. Range of motion of lumbar spine is well maintained. The patient ambulates in a non-myelopathic manner. Negative tension sign and straight leg raise bilaterally. Quad extension, ankle dorsiflexion, EHL, plantar flexion, and ankle eversion are well preserved. Normal secondary orthopaedic exam of bilateral hips, greater trochanteric area, knees and ankles, Significant neurogenic claudication forward flexed posture within less than 5 minutes [de-identified] : MRI of the lumbar spine has been reviewed on today's date that shows 3 levels of consecutive moderate to severe spinal stenosis at [id number] to a lesser degree to 3.  MRIs from March 2024 stand-up MRI.  Previous standing x-rays have been reviewed from the office that demonstrates an active and dynamic L4-5 spondylolisthesis these are directly compared to the seated MRIs which shows dynamic instability of L4-5

## 2024-04-12 ENCOUNTER — APPOINTMENT (OUTPATIENT)
Dept: PHYSICAL MEDICINE AND REHAB | Facility: CLINIC | Age: 69
End: 2024-04-12

## 2024-04-15 NOTE — HISTORY OF PRESENT ILLNESS
[FreeTextEntry1] : Mr. HENRIQUE AMAYA is a 68 year old male who presents with low back pain.   Location: Onset: Provocation/Palliative: Worse with walking, better with rest Quality: Radiation: Severity: Timing:   Denies any associated numbness. Denies any associated leg weakness. Denies any loss of bowel/bladder control or any groin numbness. Previous medications trialed: Previous procedures relevant to complaint: Conservative therapy tried?:

## 2024-04-15 NOTE — DATA REVIEWED
[FreeTextEntry1] : [facility] 3/13/24 Stand up MRI reviewed and interpreted by me: multilevel spondylosis, severe canal stenosis seen at L4-5  Thu Mar 14 07:32:25 T  -- Final Report  Patient Name : JOSE LUIS^HENRIQUE^^^^ Patient ID : [id number] Patient  : 1955 Accession Number : [id number]   PATIENT NAME: HENRIQUE AMAYA  ID NUMBER: [id number]  YOB: 1955  REFERRING PHYSICIAN: TA SHIRLEY [address] [address]  DATE OF SERVICE: 2024  MAGNETIC RESONANCE IMAGING OF THE LUMBAR SPINE  TECHNIQUE: Multiplanar, multisequential MRI was performed in the neutral sitting position.  HISTORY: The patient complains of low back pain.  COMPARISON: Comparison is made with a prior lumbar MRI examination from 10/18/2019.  INTERPRETATION: There is congenital spinal canal caliber narrowing at the mid through lower lumbar levels. There is epidural lipomatosis; there is prominent interspinal epidural fat surrounding the thecal sac at the mid through lower lumbar levels.  There is straightening of the normal lumbar lordotic curvature, possibly owing to muscle spasm.  There is anterior disc bulging at each level throughout the lumbar spine. Anterior bulging is new at the L1-2 and L2-3 levels in comparison to the prior examination.  At the L1-2 level, there is a posterior disc bulge impressing upon the thecal sac.  At the L2-3 level, there is a posterior disc bulge impressing upon the thecal sac, larger in comparison to the prior study.  At the L3-4 level, there is a posterior disc bulge impressing upon the thecal sac. There is bilateral facet arthropathy and redundancy of the ligamentum flavum. There is spinal stenosis; both lateral recesses and neural foramina are narrowed.  At the L4-5 level, there is grade I spondylolisthesis, posterior disc bulge, and hypertrophic posterior element changes. There is severe spinal stenosis; there is narrowing of the central canal, both lateral recesses, and both neural foramen. There is a superimposed right foraminal disc herniation contributing to narrowing of the right neural foramen.  At the L5-S1 level, there is a posterior disc herniation favoring the right impressing upon the thecal sac. There is right more so than left lateral recess narrowing, there is right more so than left neural foraminal narrowing. There is bilateral facet arthropathy.  Aside from interval differences as above, the lumbar spine appearance is otherwise without significant change in comparison to the 10/18/2019 examination.  There is disc hydration loss from L3-4 through L5-S1, manifested as decreased T2 signal. There are disc adjacent marrow reactive changes adjacent to the L4-5 disc.  Examination otherwise demonstrates the remaining lumbar vertebral bodies and intervertebral discs to be unremarkable in height and signal. The conus medullaris is unremarkable in signal, morphology and position. No focal prevertebral or posterior paraspinal abnormal masses or altered signals are otherwise noted.  IMPRESSION:  * Congenital spinal canal caliber narrowing at the mid through lower lumbar levels. Epidural lipomatosis; prominent interspinal epidural fat surrounding the thecal sac at the mid through lower lumbar levels.  * Straightening of the normal lumbar lordotic curvature, possibly owing to muscle spasm.  * L1-2 posterior disc bulge impressing upon the thecal sac.  * L2-3 posterior disc bulge impressing upon the thecal sac, larger in comparison to the prior study.  * L3-4 posterior disc bulge impressing upon the thecal sac. Bilateral facet arthropathy and redundancy of the ligamentum flavum. Spinal stenosis; both lateral recesses and neural foramina are narrowed.  * L4-5 grade I spondylolisthesis, posterior disc bulge, and hypertrophic posterior element changes. Severe spinal stenosis; narrowing of the central canal, both lateral recesses, and both neural foramen. Superimposed right foraminal disc herniation contributing to narrowing of the right neural foramen.  * L5-S1 posterior disc herniation favoring the right impressing upon the thecal sac. Right more so than left lateral recess narrowing, right more so than left neural foraminal narrowing. Bilateral facet arthropathy.  Derick Sprague MD  Diplomate of the American Board of Radiology   with Added Qualifications in Neuroradiology  HA/mf

## 2024-04-15 NOTE — PHYSICAL EXAM
[FreeTextEntry1] : PE: Constitutional: In NAD, calm and cooperative MSK (Back)  Inspection: no gross swelling identified  Palpation: Tenderness of the bilateral lower lumbar paraspinals  ROM: Pain at end lumbar extension/flexion  Strength: 5/5 strength in bilateral lower extremities  Reflexes: 2+ Patella reflex bilaterally, 2+ Achilles reflex bilaterally, negative clonus bilaterally  Sensation: Intact to light touch in bilateral lower extremities Special tests: SLR: negative bilaterally EDMOND: negative bilaterally FADIR: negative bilaterally Facet loading: positive bilaterally

## 2024-06-28 ENCOUNTER — APPOINTMENT (OUTPATIENT)
Dept: ORTHOPEDIC SURGERY | Facility: CLINIC | Age: 69
End: 2024-06-28
Payer: MEDICARE

## 2024-06-28 VITALS — HEIGHT: 69 IN | WEIGHT: 265 LBS | BODY MASS INDEX: 39.25 KG/M2

## 2024-06-28 DIAGNOSIS — G56.80 OTHER SPECIFIED MONONEUROPATHIES OF UNSPECIFIED UPPER LIMB: ICD-10-CM

## 2024-06-28 DIAGNOSIS — M43.16 SPONDYLOLISTHESIS, LUMBAR REGION: ICD-10-CM

## 2024-06-28 DIAGNOSIS — M48.07 SPINAL STENOSIS, LUMBOSACRAL REGION: ICD-10-CM

## 2024-06-28 PROCEDURE — 99214 OFFICE O/P EST MOD 30 MIN: CPT

## 2024-08-30 ENCOUNTER — RX RENEWAL (OUTPATIENT)
Age: 69
End: 2024-08-30

## 2024-10-25 NOTE — H&P ADULT - NSVTERISKASSESS_GEN_ALL_CORE FT
----- Message from Armida sent at 10/25/2024  2:46 PM CDT -----  Contact: paul  Type:  RX Refill Request    Who Called: paul  Refill or New Rx:refill  RX Name and Strength:1)blood glucose control high,low (ACCU-CHEK TOBY CONTROL SOLN MISC)  2)blood sugar diagnostic (ACCU-CHEK TOBY PLUS TEST STRP MISC)  3)blood-glucose meter (ACCU-CHEK TOBY PLUS METER MISC)  4)lancing device with lancets Kit  How is the patient currently taking it? (ex. 1XDay):daily  Is this a 30 day or 90 day RX:90  Preferred Pharmacy with phone number:[facility]  [phone] #  [phone] fax#  Local or Mail Order:mail order  Ordering Provider:audra  Would the patient rather a call back or a response via [facility]? N/a  Best Call Back Number:n/a  Additional Information: n/a   Surgical Assessment Completed on: 31-May-2022 22:29

## 2024-11-25 ENCOUNTER — APPOINTMENT (OUTPATIENT)
Dept: CARDIOLOGY | Facility: CLINIC | Age: 69
End: 2024-11-25
Payer: MEDICARE

## 2024-11-25 ENCOUNTER — NON-APPOINTMENT (OUTPATIENT)
Age: 69
End: 2024-11-25

## 2024-11-25 VITALS
BODY MASS INDEX: 37.18 KG/M2 | OXYGEN SATURATION: 97 % | WEIGHT: 251 LBS | HEART RATE: 109 BPM | SYSTOLIC BLOOD PRESSURE: 126 MMHG | DIASTOLIC BLOOD PRESSURE: 80 MMHG | HEIGHT: 69 IN

## 2024-11-25 DIAGNOSIS — I27.20 PULMONARY HYPERTENSION, UNSPECIFIED: ICD-10-CM

## 2024-11-25 DIAGNOSIS — I10 ESSENTIAL (PRIMARY) HYPERTENSION: ICD-10-CM

## 2024-11-25 DIAGNOSIS — I35.0 NONRHEUMATIC AORTIC (VALVE) STENOSIS: ICD-10-CM

## 2024-11-25 DIAGNOSIS — I51.7 CARDIOMEGALY: ICD-10-CM

## 2024-11-25 PROCEDURE — 99204 OFFICE O/P NEW MOD 45 MIN: CPT

## 2024-11-25 PROCEDURE — 99214 OFFICE O/P EST MOD 30 MIN: CPT

## 2024-11-25 PROCEDURE — 93000 ELECTROCARDIOGRAM COMPLETE: CPT

## 2024-12-13 ENCOUNTER — APPOINTMENT (OUTPATIENT)
Dept: CARDIOLOGY | Facility: CLINIC | Age: 69
End: 2024-12-13
Payer: MEDICARE

## 2024-12-13 PROCEDURE — 93306 TTE W/DOPPLER COMPLETE: CPT

## 2024-12-13 PROCEDURE — 93978 VASCULAR STUDY: CPT

## 2025-01-17 ENCOUNTER — NON-APPOINTMENT (OUTPATIENT)
Age: 70
End: 2025-01-17

## 2025-01-22 NOTE — ED ADULT NURSE NOTE - ED STAT RN HANDOFF DETAILS
Pt returned from Dialysis after receiving full session. pt not in any distress or discomfort. pt waiting for bed assignment. pt safety maintained
Report given by previous RN. Pt not in any distress or discomfort. Report given to Dialysis RN. Pt went up for dialysis and rest of blood work will be done at dialysis. EKG done as ordered. and pt safety maintained
TA Watson took patient emergently to OR for urology stent placement

## 2025-02-05 ENCOUNTER — APPOINTMENT (OUTPATIENT)
Dept: GASTROENTEROLOGY | Facility: CLINIC | Age: 70
End: 2025-02-05

## 2025-02-08 ENCOUNTER — NON-APPOINTMENT (OUTPATIENT)
Age: 70
End: 2025-02-08

## 2025-02-10 ENCOUNTER — APPOINTMENT (OUTPATIENT)
Dept: CARDIOLOGY | Facility: CLINIC | Age: 70
End: 2025-02-10

## 2025-02-10 VITALS
BODY MASS INDEX: 37.18 KG/M2 | HEART RATE: 86 BPM | DIASTOLIC BLOOD PRESSURE: 64 MMHG | OXYGEN SATURATION: 98 % | HEIGHT: 69 IN | WEIGHT: 251 LBS | SYSTOLIC BLOOD PRESSURE: 102 MMHG

## 2025-02-10 DIAGNOSIS — I35.0 NONRHEUMATIC AORTIC (VALVE) STENOSIS: ICD-10-CM

## 2025-02-10 DIAGNOSIS — I10 ESSENTIAL (PRIMARY) HYPERTENSION: ICD-10-CM

## 2025-02-10 PROCEDURE — 93000 ELECTROCARDIOGRAM COMPLETE: CPT

## 2025-02-10 PROCEDURE — 99214 OFFICE O/P EST MOD 30 MIN: CPT

## 2025-02-10 RX ORDER — TIRZEPATIDE 5 MG/.5ML
5 INJECTION, SOLUTION SUBCUTANEOUS
Refills: 0 | Status: ACTIVE | COMMUNITY

## 2025-02-21 ENCOUNTER — NON-APPOINTMENT (OUTPATIENT)
Age: 70
End: 2025-02-21

## 2025-03-07 ENCOUNTER — OUTPATIENT (OUTPATIENT)
Dept: OUTPATIENT SERVICES | Facility: HOSPITAL | Age: 70
LOS: 1 days | End: 2025-03-07

## 2025-03-07 ENCOUNTER — APPOINTMENT (OUTPATIENT)
Dept: ULTRASOUND IMAGING | Facility: CLINIC | Age: 70
End: 2025-03-07
Payer: MEDICARE

## 2025-03-07 DIAGNOSIS — N20.0 CALCULUS OF KIDNEY: ICD-10-CM

## 2025-03-07 DIAGNOSIS — Z90.49 ACQUIRED ABSENCE OF OTHER SPECIFIED PARTS OF DIGESTIVE TRACT: Chronic | ICD-10-CM

## 2025-03-07 DIAGNOSIS — Z90.79 ACQUIRED ABSENCE OF OTHER GENITAL ORGAN(S): Chronic | ICD-10-CM

## 2025-03-07 PROCEDURE — 76770 US EXAM ABDO BACK WALL COMP: CPT | Mod: 26

## 2025-03-12 ENCOUNTER — APPOINTMENT (OUTPATIENT)
Dept: UROLOGY | Facility: CLINIC | Age: 70
End: 2025-03-12
Payer: MEDICARE

## 2025-03-12 DIAGNOSIS — N28.1 CYST OF KIDNEY, ACQUIRED: ICD-10-CM

## 2025-03-12 PROCEDURE — 99214 OFFICE O/P EST MOD 30 MIN: CPT

## 2025-04-02 ENCOUNTER — APPOINTMENT (OUTPATIENT)
Dept: UROLOGY | Facility: CLINIC | Age: 70
End: 2025-04-02

## 2025-04-21 ENCOUNTER — APPOINTMENT (OUTPATIENT)
Dept: CT IMAGING | Facility: CLINIC | Age: 70
End: 2025-04-21
Payer: MEDICARE

## 2025-04-21 ENCOUNTER — OUTPATIENT (OUTPATIENT)
Dept: OUTPATIENT SERVICES | Facility: HOSPITAL | Age: 70
LOS: 1 days | End: 2025-04-21

## 2025-04-21 DIAGNOSIS — Z90.49 ACQUIRED ABSENCE OF OTHER SPECIFIED PARTS OF DIGESTIVE TRACT: Chronic | ICD-10-CM

## 2025-04-21 DIAGNOSIS — Z90.79 ACQUIRED ABSENCE OF OTHER GENITAL ORGAN(S): Chronic | ICD-10-CM

## 2025-04-21 DIAGNOSIS — N28.1 CYST OF KIDNEY, ACQUIRED: ICD-10-CM

## 2025-04-21 PROCEDURE — 74170 CT ABD WO CNTRST FLWD CNTRST: CPT | Mod: 26

## 2025-05-19 ENCOUNTER — RX RENEWAL (OUTPATIENT)
Age: 70
End: 2025-05-19

## 2025-07-09 ENCOUNTER — APPOINTMENT (OUTPATIENT)
Dept: UROLOGY | Facility: CLINIC | Age: 70
End: 2025-07-09
Payer: MEDICARE

## 2025-07-09 PROCEDURE — 99213 OFFICE O/P EST LOW 20 MIN: CPT

## 2025-07-25 ENCOUNTER — APPOINTMENT (OUTPATIENT)
Facility: CLINIC | Age: 70
End: 2025-07-25
Payer: MEDICARE

## 2025-07-25 VITALS
WEIGHT: 195 LBS | OXYGEN SATURATION: 97 % | HEART RATE: 75 BPM | DIASTOLIC BLOOD PRESSURE: 64 MMHG | SYSTOLIC BLOOD PRESSURE: 82 MMHG | BODY MASS INDEX: 28.88 KG/M2 | HEIGHT: 69 IN

## 2025-07-25 DIAGNOSIS — I51.7 CARDIOMEGALY: ICD-10-CM

## 2025-07-25 PROCEDURE — 99214 OFFICE O/P EST MOD 30 MIN: CPT

## 2025-07-25 PROCEDURE — 93000 ELECTROCARDIOGRAM COMPLETE: CPT

## 2025-07-25 RX ORDER — METOPROLOL TARTRATE 100 MG/1
100 TABLET ORAL DAILY
Refills: 0 | Status: ACTIVE | COMMUNITY

## 2025-07-25 RX ORDER — LOSARTAN POTASSIUM 50 MG/1
50 TABLET, FILM COATED ORAL
Qty: 90 | Refills: 0 | Status: ACTIVE | COMMUNITY
Start: 2025-07-25 | End: 1900-01-01

## 2025-08-02 ENCOUNTER — NON-APPOINTMENT (OUTPATIENT)
Age: 70
End: 2025-08-02

## 2025-08-02 ENCOUNTER — APPOINTMENT (OUTPATIENT)
Dept: CARDIOLOGY | Facility: CLINIC | Age: 70
End: 2025-08-02
Payer: MEDICARE

## 2025-08-02 PROCEDURE — 93306 TTE W/DOPPLER COMPLETE: CPT

## 2025-08-04 ENCOUNTER — NON-APPOINTMENT (OUTPATIENT)
Age: 70
End: 2025-08-04

## 2025-08-07 ENCOUNTER — APPOINTMENT (OUTPATIENT)
Dept: CARDIOLOGY | Facility: CLINIC | Age: 70
End: 2025-08-07
Payer: MEDICARE

## 2025-08-07 DIAGNOSIS — R06.09 OTHER FORMS OF DYSPNEA: ICD-10-CM

## 2025-08-07 DIAGNOSIS — I10 ESSENTIAL (PRIMARY) HYPERTENSION: ICD-10-CM

## 2025-08-07 DIAGNOSIS — I35.0 NONRHEUMATIC AORTIC (VALVE) STENOSIS: ICD-10-CM

## 2025-08-07 PROCEDURE — 99204 OFFICE O/P NEW MOD 45 MIN: CPT | Mod: 93

## 2025-08-25 ENCOUNTER — APPOINTMENT (OUTPATIENT)
Dept: CARDIOTHORACIC SURGERY | Facility: CLINIC | Age: 70
End: 2025-08-25
Payer: MEDICARE

## 2025-08-25 ENCOUNTER — APPOINTMENT (OUTPATIENT)
Dept: CARDIOLOGY | Facility: CLINIC | Age: 70
End: 2025-08-25

## 2025-08-25 ENCOUNTER — NON-APPOINTMENT (OUTPATIENT)
Age: 70
End: 2025-08-25

## 2025-08-25 VITALS
HEIGHT: 69 IN | BODY MASS INDEX: 28.88 KG/M2 | TEMPERATURE: 98.1 F | DIASTOLIC BLOOD PRESSURE: 64 MMHG | HEART RATE: 79 BPM | OXYGEN SATURATION: 99 % | WEIGHT: 195 LBS | SYSTOLIC BLOOD PRESSURE: 94 MMHG | RESPIRATION RATE: 15 BRPM

## 2025-08-25 DIAGNOSIS — I35.0 NONRHEUMATIC AORTIC (VALVE) STENOSIS: ICD-10-CM

## 2025-08-25 PROCEDURE — 99204 OFFICE O/P NEW MOD 45 MIN: CPT

## 2025-08-25 PROCEDURE — 99215 OFFICE O/P EST HI 40 MIN: CPT

## 2025-08-25 RX ORDER — BUPROPION HYDROCHLORIDE 100 MG/1
TABLET, FILM COATED ORAL
Refills: 0 | Status: ACTIVE | COMMUNITY

## 2025-09-08 ENCOUNTER — APPOINTMENT (OUTPATIENT)
Dept: CT IMAGING | Facility: CLINIC | Age: 70
End: 2025-09-08
Payer: MEDICARE

## 2025-09-08 PROCEDURE — 74174 CTA ABD&PLVS W/CONTRAST: CPT | Mod: 26

## 2025-09-08 PROCEDURE — 75574 CT ANGIO HRT W/3D IMAGE: CPT | Mod: 26

## 2025-09-08 PROCEDURE — 71275 CT ANGIOGRAPHY CHEST: CPT | Mod: 26

## 2025-09-15 ENCOUNTER — TRANSCRIPTION ENCOUNTER (OUTPATIENT)
Age: 70
End: 2025-09-15

## 2025-09-23 PROBLEM — N39.0 URINARY TRACT INFECTION: Status: ACTIVE | Noted: 2025-09-23

## 2025-09-26 PROBLEM — Z95.2 S/P TAVR (TRANSCATHETER AORTIC VALVE REPLACEMENT): Status: ACTIVE | Noted: 2025-09-26

## (undated) DEVICE — PREP BETADINE SPONGE STICKS

## (undated) DEVICE — DRAPE GYN SURG LINGEMAN

## (undated) DEVICE — PACK CYSTOSCOPY TIBURON 10/CS

## (undated) DEVICE — PACK MINOR WITH LAP

## (undated) DEVICE — CONTAINER SPECIMEN 100ML

## (undated) DEVICE — VALVE ENDO SURESEAL II 0-5FR

## (undated) DEVICE — WRAP COMPRESSION CALF LG

## (undated) DEVICE — ADAPTER URETHRAL CATH CONNECTING

## (undated) DEVICE — SYR CONTROL LUER LOK 10CC

## (undated) DEVICE — SOL IRR BAG NS 0.9% 3000ML

## (undated) DEVICE — GLV 7.5 PROTEXIS

## (undated) DEVICE — DRAIN PENROSE .25" X 12"

## (undated) DEVICE — SUT VICRYL 3-0 18" SH UNDYED

## (undated) DEVICE — GLV 8 PROTEXIS

## (undated) DEVICE — TUBING TUR 2 PRONG

## (undated) DEVICE — TUBING CONNECTING 6MM 20FT

## (undated) DEVICE — DRAPE C ARM UNIVERSAL

## (undated) DEVICE — SUT VICRYL PLUS 2-0 27" CT-1 UNDYED

## (undated) DEVICE — SYR LUER LOK 10CC

## (undated) DEVICE — WRAP COMPRESSION CALF MED

## (undated) DEVICE — SPONGE RAYTEC 4X4 16PLY

## (undated) DEVICE — DEVICE INFLT INTEGRATED 20ML

## (undated) DEVICE — PORT BIOPSY

## (undated) DEVICE — MEDICATION LABELS W MARKER

## (undated) DEVICE — BLANKET WARMER UPPER ADULT

## (undated) DEVICE — GOWN TRIMAX LG

## (undated) DEVICE — FOLEY TRAY 16FR LF URINE METER SURESTEP